# Patient Record
Sex: MALE | Race: WHITE | NOT HISPANIC OR LATINO | Employment: FULL TIME | URBAN - METROPOLITAN AREA
[De-identification: names, ages, dates, MRNs, and addresses within clinical notes are randomized per-mention and may not be internally consistent; named-entity substitution may affect disease eponyms.]

---

## 2020-05-06 ENCOUNTER — TELEPHONE (OUTPATIENT)
Dept: SCHEDULING | Facility: IMAGING CENTER | Age: 35
End: 2020-05-06

## 2020-05-07 ENCOUNTER — TELEMEDICINE (OUTPATIENT)
Dept: MEDICAL GROUP | Facility: MEDICAL CENTER | Age: 35
End: 2020-05-07
Payer: COMMERCIAL

## 2020-05-07 VITALS
SYSTOLIC BLOOD PRESSURE: 120 MMHG | WEIGHT: 315 LBS | TEMPERATURE: 98.6 F | HEIGHT: 69 IN | DIASTOLIC BLOOD PRESSURE: 85 MMHG | BODY MASS INDEX: 46.65 KG/M2

## 2020-05-07 DIAGNOSIS — G47.33 OSA (OBSTRUCTIVE SLEEP APNEA): ICD-10-CM

## 2020-05-07 DIAGNOSIS — E66.01 CLASS 3 SEVERE OBESITY DUE TO EXCESS CALORIES WITHOUT SERIOUS COMORBIDITY WITH BODY MASS INDEX (BMI) OF 45.0 TO 49.9 IN ADULT (HCC): ICD-10-CM

## 2020-05-07 DIAGNOSIS — R06.02 SOB (SHORTNESS OF BREATH): ICD-10-CM

## 2020-05-07 DIAGNOSIS — R42 LIGHTHEADED: ICD-10-CM

## 2020-05-07 DIAGNOSIS — R94.31 ABNORMAL EKG: ICD-10-CM

## 2020-05-07 PROBLEM — E66.813 CLASS 3 SEVERE OBESITY DUE TO EXCESS CALORIES WITHOUT SERIOUS COMORBIDITY WITH BODY MASS INDEX (BMI) OF 45.0 TO 49.9 IN ADULT (HCC): Status: ACTIVE | Noted: 2020-05-07

## 2020-05-07 PROCEDURE — 99203 OFFICE O/P NEW LOW 30 MIN: CPT | Mod: 95,CR | Performed by: FAMILY MEDICINE

## 2020-05-07 SDOH — HEALTH STABILITY: MENTAL HEALTH: HOW MANY STANDARD DRINKS CONTAINING ALCOHOL DO YOU HAVE ON A TYPICAL DAY?: 1 OR 2

## 2020-05-07 SDOH — HEALTH STABILITY: MENTAL HEALTH: HOW OFTEN DO YOU HAVE A DRINK CONTAINING ALCOHOL?: 2-3 TIMES A WEEK

## 2020-05-07 NOTE — ASSESSMENT & PLAN NOTE
Chronic problem. Currently using CPAP. Has for the past couple years. SOB seems worse since starting the cpap, gets lightheaded more often. He does get much better sleep with the cpap.

## 2020-05-07 NOTE — ASSESSMENT & PLAN NOTE
Chronic problem. Has been going on for he past few years. Occurs for 10-15 seconds. Feels the urge to gasp for air at that time. Improves after taking 2-3 deep breaths. Most of the time it happens with exertion.   HaD EKG which showed possible previous MI.   Occurs randomly. Does not seem to be associated with activity.   Works as  for [employer].   BP stable. Checks at home every once in a while.   Gets palpitations at times. Sometimes associated with lightheadedness/SOB.

## 2020-05-07 NOTE — ASSESSMENT & PLAN NOTE
Chronic problem. Has been gaining weight for the past 7-8 yrs. Has gained about 100lbs since then. He was an  at that point.

## 2020-05-08 NOTE — PROGRESS NOTES
Telemedicine Visit: New Patient     This encounter was conducted via Zoom .   Verbal consent was obtained. Patient's identity was verified.    Subjective:     CC:   Connor Jacinto is a 34 y.o. male presenting to establish care and to discuss the evaluation and management of    Lightheaded  Chronic problem. Has been going on for he past few years. Occurs for 10-15 seconds. Feels the urge to gasp for air at that time. Improves after taking 2-3 deep breaths. Most of the time it happens with exertion and is always associated with SOB.   Had EKG which showed possible previous MI.   He reports the symptom of lightheadedness can occur at rest as well.  Works as  for [employer].   BP stable. Checks at home every once in a while.   Gets palpitations at times. Sometimes associated with lightheadedness/SOB.     DONAL (obstructive sleep apnea)  Chronic problem. Currently using CPAP. Has for the past couple years. SOB seems worse since starting the cpap, gets lightheaded more often. He does get much better sleep with the cpap. Does not have a sleep doctor here in [place] yet.     Class 3 severe obesity due to excess calories without serious comorbidity with body mass index (BMI) of 45.0 to 49.9 in adult (HCC)  Chronic problem. Has been gaining weight for the past 7-8 yrs. Has gained about 100lbs since then. He was an  at that point. Reports a rather poor diet. No dedicated exercise although job is somewhat active.       ROS  See HPI  Constitutional: Negative for fever, chills and malaise/fatigue.   HENT: Negative for congestion.    Eyes: Negative for pain.   Respiratory: See HPI  Cardiovascular: See HPI  Gastrointestinal: Negative for nausea, vomiting, abdominal pain and diarrhea.   Genitourinary: Negative for dysuria and hematuria.   Skin: Negative for rash.   Neurological: Negative for dizziness, focal weakness and headaches.   Endo/Heme/Allergies: Does not bruise/bleed easily.   Psychiatric/Behavioral:  "Negative for depression.  The patient is not nervous/anxious.      Allergies   Allergen Reactions   • Morphine        Current medicines (including changes today)  No current outpatient medications on file.     No current facility-administered medications for this visit.        He  has no past medical history on file.  He  has a past surgical history that includes knee reconstruction and hernia repair.      Family History   Problem Relation Age of Onset   • Diabetes Mother    • Heart Disease Father         55   • Hypertension Father    • Diabetes Father    • Lung Disease Father         mesothelioma   • Diabetes Maternal Grandmother      Family Status   Relation Name Status   • Mo  Alive   • Fa     • MGMo  Alive       Patient Active Problem List    Diagnosis Date Noted   • Lightheaded 2020   • DONAL (obstructive sleep apnea) 2020   • Class 3 severe obesity due to excess calories without serious comorbidity with body mass index (BMI) of 45.0 to 49.9 in adult ([facility]) 2020          Objective:   Vitals obtained by patient:  Blood pressure: 120/85, Temp: 98.6, Height: 5'9\" and Weight: 330lb    Physical Exam:  Constitutional: Alert, no distress, well-groomed, obese.  Skin: No rashes in visible areas.  Eye: Round. Conjunctiva clear, lids normal. No icterus.   ENMT: Lips pink without lesions, good dentition, moist mucous membranes. Phonation normal.  Neck: No masses, no thyromegaly. Moves freely without pain.  CV: Pulse as reported by patient  Respiratory: Unlabored respiratory effort, no cough or audible wheeze  Psych: Alert and oriented x3, normal affect and mood.       Assessment and Plan:   The following treatment plan was discussed:     1. Lightheaded  New problem. Going on x 2 years. Assoc with SOB. Referral placed to cardiology. Patient likely needs stress test. Reports h/o abnormal EKG and possible h/o MI according to prior ekg (per patient).  - REFERRAL TO CARDIOLOGY  - CBC WITH DIFFERENTIAL; " Future    2. DONAL (obstructive sleep apnea)  Chronic problem. Uses CPAP regularly. Sleep well. However, reports SOB and lightheadedness seem to be worse since using cpap. Referral placed to sleep medicine.   - REFERRAL TO PULMONOLOGY    3. SOB (shortness of breath)  New problem. Associated with lightheadedness. Improves with some deep breaths. See above for plan.   - REFERRAL TO CARDIOLOGY  - CBC WITH DIFFERENTIAL; Future    4. Abnormal EKG  - REFERRAL TO CARDIOLOGY  - Comp Metabolic Panel; Future    5. Class 3 severe obesity due to excess calories without serious comorbidity with body mass index (BMI) of 45.0 to 49.9 in adult (HCC)  Chronic problem. New to discuss. Discussed diet and exercise today. Ordered the following albs. Plan to f/u in 1 month.  - Comp Metabolic Panel; Future  - Lipid Profile; Future  - TSH WITH REFLEX TO FT4; Future  - HEMOGLOBIN A1C; Future        Follow-up: Return in about 4 weeks (around 6/4/2020).

## 2020-06-26 ENCOUNTER — APPOINTMENT (OUTPATIENT)
Dept: MEDICAL GROUP | Facility: MEDICAL CENTER | Age: 35
End: 2020-06-26
Payer: COMMERCIAL

## 2020-07-10 ENCOUNTER — TELEMEDICINE (OUTPATIENT)
Dept: MEDICAL GROUP | Facility: MEDICAL CENTER | Age: 35
End: 2020-07-10
Payer: COMMERCIAL

## 2020-07-10 VITALS — TEMPERATURE: 98.6 F | BODY MASS INDEX: 46.65 KG/M2 | WEIGHT: 315 LBS | HEIGHT: 69 IN

## 2020-07-10 DIAGNOSIS — M54.50 ACUTE BILATERAL LOW BACK PAIN WITHOUT SCIATICA: ICD-10-CM

## 2020-07-10 PROCEDURE — 99214 OFFICE O/P EST MOD 30 MIN: CPT | Mod: 95,CR | Performed by: FAMILY MEDICINE

## 2020-07-10 ASSESSMENT — PATIENT HEALTH QUESTIONNAIRE - PHQ9: CLINICAL INTERPRETATION OF PHQ2 SCORE: 0
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
## 2020-07-14 ENCOUNTER — TELEPHONE (OUTPATIENT)
Dept: CARDIOLOGY | Facility: MEDICAL CENTER | Age: 35
End: 2020-07-14

## 2020-07-14 NOTE — TELEPHONE ENCOUNTER
Faxed a request for medical records to [facility]   PH:[phone].  FAX:[phone]    To be faxed to our medical records dept. [phone]

## 2020-08-27 ENCOUNTER — TELEPHONE (OUTPATIENT)
Dept: SLEEP MEDICINE | Facility: MEDICAL CENTER | Age: 35
End: 2020-08-27

## 2020-08-27 ENCOUNTER — TELEMEDICINE (OUTPATIENT)
Dept: SLEEP MEDICINE | Facility: MEDICAL CENTER | Age: 35
End: 2020-08-27
Payer: COMMERCIAL

## 2020-08-27 VITALS — BODY MASS INDEX: 45.91 KG/M2 | HEIGHT: 69 IN | WEIGHT: 310 LBS

## 2020-08-27 DIAGNOSIS — G47.33 OSA (OBSTRUCTIVE SLEEP APNEA): ICD-10-CM

## 2020-08-27 PROCEDURE — 99203 OFFICE O/P NEW LOW 30 MIN: CPT | Performed by: FAMILY MEDICINE

## 2020-08-27 NOTE — TELEPHONE ENCOUNTER
[facility] for the pt informing him that I sent him the release of records and the order for new travel cpap to the email [email or web address]. I also advise the pt to give us a call back to schedule a 6 month FV per Dr. Heath and to get the phone of the DME he is using which is [facility] in [place].

## 2020-08-27 NOTE — PROGRESS NOTES
"Telemedicine Visit: New Patient     This encounter was conducted via Zoom . Verbal consent was obtained. Patient's identity was verified.   Given the importance of social distancing and other strategies recommended to reduce the risk of COVID-19 transmission, I am providing medical care to this patient via audio/video visit in place of an in person visit at the request of the patient.        [facility]  Consult Note     Date: 8/27/2020 / Time: 1:22 PM    Patient ID:   Name:             Connor Jacinto     YOB: 1985  Age:                 35 y.o.  male   MRN:               [id number]      Thank you for requesting a sleep medicine consultation on Connor Jacinto at the sleep center. He presents today with the chief complaints of DONAL and to establish as a new pt.Pt was diagnosed with DONAL about 2 years ago in [place] and has been on CPAP since then.The initial presenting symptoms were snoring, witnessed apneas and excessive daytime sleepiness.He is referred by Dr. Patel for evaluation and treatment of sleep disorder breathing.     HISTORY OF PRESENT ILLNESS:       At night,  Connor Jacinto goes to bed around 8-9 pm on weekdays and around 10 pm on the weekends. He gets out of bed at 3:30-4 am on weekdays and at 8-9 am on the weekends.  He  Averages about 8 hrs of sleep on a good night and 6 hrs on a bad night. Pt rarely has bad nights since he has been on the CPAP. He falls asleep within 20-30 minutes. He wakes up about 1 times during the night due to bathroom use and on average It takes him few min to fall back asleep.      He is not aware of snoring/breathing pauses/gasping or choking in sleep since he has been on the CPAP.  He  denies any symptoms of restless legs syndrome such as an \"urge to move\"  He  legs in the evening or bedtime. He  denies any symptoms of narcolepsy such as sleep paralysis or cataplexy, or any symptoms to suggest parasomnias such as sleep walking or acting out of dreams. He  " "has not used any medications for the sleep problem.Overall, he does finds his sleep refreshing.  In terms of  excessive daytime sleepiness, he complains of sleepiness while  at work, while reading or watching TV or while driving. He  occasionally takes a nap. The naps are usually 60 min long.    REVIEW OF SYSTEMS:       Constitutional: Denies fevers, Denies weight changes  Eyes: Denies changes in vision, no eye pain  Ears/Nose/Throat/Mouth: Denies nasal congestion or sore throat   Cardiovascular: Denies chest pain or palpitations   Respiratory: Denies shortness of breath , Denies cough  Gastrointestinal/Hepatic: Denies abdominal pain, nausea, vomiting, diarrhea, constipation or GI bleeding   Genitourinary: Denies bladder dysfunction, dysuria or frequency  Musculoskeletal/Rheum: Denies  joint pain and swelling   Skin/Breast: Denies rash  Neurological: Denies headache, confusion, memory loss or focal weakness/parasthesias  Psychiatric: denies mood disorder     Comprehensive review of systems form is reviewed with the patient and is attached in the EMR.     PMH:  has a past medical history of Chickenpox.  MEDS: No current outpatient medications on file.  ALLERGIES:   Allergies   Allergen Reactions   • Morphine      SURGHX:   Past Surgical History:   Procedure Laterality Date   • HERNIA REPAIR     • KNEE RECONSTRUCTION       SOCHX:  reports that he has been smoking cigarettes. He has been smoking about 1.00 pack per day. He has never used smokeless tobacco. He reports current alcohol use. He reports that he does not use drugs.   FH:   Family History   Problem Relation Age of Onset   • Diabetes Mother    • Heart Disease Father         55   • Hypertension Father    • Diabetes Father    • Lung Disease Father         mesothelioma   • Diabetes Maternal Grandmother    • Sleep Apnea Neg Hx        Physical Exam:  Vitals/ General Appearance:   Weight/BMI: Body mass index is 45.78 kg/m².  Ht 1.753 m (5' 9\")   Wt (!) 140.6 kg " "(310 lb)   Vitals:    08/27/20 1310   Weight: (!) 140.6 kg (310 lb)   Height: 1.753 m (5' 9\")           Constitutional: Alert, no distress, well-groomed.  Skin: No rashes in visible areas.  Eye: Round. Conjunctiva clear, lids normal. No icterus.   ENMT: Lips pink without lesions, good dentition, moist mucous membranes. Phonation normal.  Neck: No masses, no thyromegaly. Moves freely without pain.  CV: Pulse as reported by patient  Respiratory: Unlabored respiratory effort, no cough or audible wheeze  Psych: Alert and oriented x3, normal affect and mood.   INVESTIGATIONS:       .ASSESSMENT AND PLAN     1. Sleep Apnea      The pathophysiology of sleep anea and the increased risk of cardiovascular morbidity from untreated sleep apnea is discussed in detail with the patient. He is urged to avoid supine sleep, weight gain and alcoholic beverages since all of these can worsen sleep apnea. He is cautioned against drowsy driving. If He feels sleepy while driving, He must pull over for a break/nap, rather than persist on the road, in the interest of He own safety and that of others on the road.   Plan   - Continue CPAP at the current pressure   - recommended to bring the CPAP for the compliance download and to sign the release of medical  record ( [facility], [place])   - travel CPAP is ordered today    - Supplies are ordered but waiting for the SS to establish with a local DME    - compliance was reinforced     2. Regarding treatment of other past medical problems and general health maintenance,  He is urged to follow up with PCP.      "

## 2020-09-24 ENCOUNTER — OFFICE VISIT (OUTPATIENT)
Dept: MEDICAL GROUP | Facility: MEDICAL CENTER | Age: 35
End: 2020-09-24
Payer: COMMERCIAL

## 2020-09-24 VITALS
TEMPERATURE: 100.1 F | BODY MASS INDEX: 46.65 KG/M2 | HEART RATE: 101 BPM | SYSTOLIC BLOOD PRESSURE: 124 MMHG | OXYGEN SATURATION: 94 % | DIASTOLIC BLOOD PRESSURE: 78 MMHG | HEIGHT: 69 IN | WEIGHT: 315 LBS

## 2020-09-24 DIAGNOSIS — G89.29 CHRONIC MIDLINE LOW BACK PAIN WITHOUT SCIATICA: ICD-10-CM

## 2020-09-24 DIAGNOSIS — M54.50 CHRONIC MIDLINE LOW BACK PAIN WITHOUT SCIATICA: ICD-10-CM

## 2020-09-24 DIAGNOSIS — E66.01 CLASS 3 SEVERE OBESITY DUE TO EXCESS CALORIES WITHOUT SERIOUS COMORBIDITY WITH BODY MASS INDEX (BMI) OF 45.0 TO 49.9 IN ADULT (HCC): ICD-10-CM

## 2020-09-24 PROCEDURE — 99214 OFFICE O/P EST MOD 30 MIN: CPT | Performed by: FAMILY MEDICINE
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
## 2020-12-03 ENCOUNTER — TELEMEDICINE (OUTPATIENT)
Dept: MEDICAL GROUP | Facility: MEDICAL CENTER | Age: 35
End: 2020-12-03
Payer: COMMERCIAL

## 2020-12-03 VITALS — BODY MASS INDEX: 46.65 KG/M2 | HEIGHT: 69 IN | WEIGHT: 315 LBS | TEMPERATURE: 98.6 F

## 2020-12-03 DIAGNOSIS — M54.50 CHRONIC MIDLINE LOW BACK PAIN WITHOUT SCIATICA: ICD-10-CM

## 2020-12-03 DIAGNOSIS — G89.29 CHRONIC MIDLINE LOW BACK PAIN WITHOUT SCIATICA: ICD-10-CM

## 2020-12-03 PROCEDURE — 99213 OFFICE O/P EST LOW 20 MIN: CPT | Mod: 95,CR | Performed by: FAMILY MEDICINE

## 2020-12-03 RX ORDER — CYCLOBENZAPRINE HCL 10 MG
10 TABLET ORAL NIGHTLY PRN
Qty: 30 TAB | Refills: 1 | Status: SHIPPED | OUTPATIENT
Start: 2020-12-03 | End: 2021-03-11 | Stop reason: SDUPTHER

## 2020-12-04 NOTE — ASSESSMENT & PLAN NOTE
Neck problem.  The patient continues to have the same low back pain.  Worse with laying down.  Difficulty sleeping in bed.  He reported no improvement with PT and actually felt like it was harming him so he stopped going.  He has an appoint with pain management on 18 December.  Denies any numbness, tingling or other neurological issues.

## 2020-12-04 NOTE — PROGRESS NOTES
Virtual Visit: Established Patient   This visit was conducted via Zoom using secure and encrypted videoconferencing technology. The patient was in a private location in the state of [place].    The patient's identity was confirmed and verbal consent was obtained for this virtual visit.    Subjective:   CC:   Chief Complaint   Patient presents with   • Back Pain       Connor Jacinto is a 35 y.o. male presenting for evaluation and management of:    Chronic midline low back pain without sciatica  Neck problem.  The patient continues to have the same low back pain.  Worse with laying down.  Difficulty sleeping in bed.  He reported no improvement with PT and actually felt like it was harming him so he stopped going.  He has an appoint with pain management on 18 December.  Denies any numbness, tingling or other neurological issues.      ROS   Denies any recent fevers or chills. No nausea or vomiting. No chest pains or shortness of breath.     Allergies   Allergen Reactions   • Morphine        Current medicines (including changes today)  Current Outpatient Medications   Medication Sig Dispense Refill   • cyclobenzaprine (FLEXERIL) 10 mg Tab Take 1 Tab by mouth at bedtime as needed for Moderate Pain or Muscle Spasms. 30 Tab 1     No current facility-administered medications for this visit.        Patient Active Problem List    Diagnosis Date Noted   • Chronic midline low back pain without sciatica 07/10/2020   • Lightheaded 05/07/2020   • DONAL (obstructive sleep apnea) 05/07/2020   • Class 3 severe obesity due to excess calories without serious comorbidity with body mass index (BMI) of 45.0 to 49.9 in adult (HCC) 05/07/2020       Family History   Problem Relation Age of Onset   • Diabetes Mother    • Heart Disease Father         55   • Hypertension Father    • Diabetes Father    • Lung Disease Father         mesothelioma   • Diabetes Maternal Grandmother    • Sleep Apnea Neg Hx        He  has a past medical history of  "Chickenpox.  He  has a past surgical history that includes knee reconstruction and hernia repair.       Objective:   Temp 37 °C (98.6 °F) (Temporal)   Ht 1.753 m (5' 9\")   Wt (!) 167.8 kg (370 lb)   BMI 54.64 kg/m²     Physical Exam:  Constitutional: Alert, no distress, well-groomed.  Skin: No rashes in visible areas.  Eye: Round. Conjunctiva clear, lids normal. No icterus.   ENMT: Lips pink without lesions, good dentition, moist mucous membranes. Phonation normal.  Neck: No masses, no thyromegaly. Moves freely without pain.  Respiratory: Unlabored respiratory effort, no cough or audible wheeze  Psych: Alert and oriented x3, normal affect and mood.       Assessment and Plan:   The following treatment plan was discussed:     1. Chronic midline low back pain without sciatica  Chronic problem, uncontrolled.  Has appoint with pain management in a couple weeks.  Has been unable to lose weight.  Reviewed MRI which showed some mild degenerative change but no surgical issues.  Prescription given for Flexeril to see if this helps at all at night.  - cyclobenzaprine (FLEXERIL) 10 mg Tab; Take 1 Tab by mouth at bedtime as needed for Moderate Pain or Muscle Spasms.  Dispense: 30 Tab; Refill: 1        Follow-up: No follow-ups on file.         "

## 2021-01-21 DIAGNOSIS — G89.29 CHRONIC MIDLINE LOW BACK PAIN WITHOUT SCIATICA: ICD-10-CM

## 2021-01-21 DIAGNOSIS — M54.50 CHRONIC MIDLINE LOW BACK PAIN WITHOUT SCIATICA: ICD-10-CM

## 2021-01-22 NOTE — PROGRESS NOTES
Pt.'s wife left a mess stating that   ============================================   [facility]   [address]   [place] [address]   Ph: [phone]   Fax: [phone]   ===========================================   Won't see pt because pt has an insurance claim.   She asked if pt could be referred elsewhere?   Would you please refer pt to a different pain specialist and let us know if there is anything else we can help on our end.   Ariela ROSSI               New referral was placed

## 2021-03-11 ENCOUNTER — TELEMEDICINE (OUTPATIENT)
Dept: MEDICAL GROUP | Facility: MEDICAL CENTER | Age: 36
End: 2021-03-11
Payer: COMMERCIAL

## 2021-03-11 VITALS — TEMPERATURE: 98.6 F | HEIGHT: 69 IN | BODY MASS INDEX: 46.65 KG/M2 | WEIGHT: 315 LBS

## 2021-03-11 DIAGNOSIS — M54.50 CHRONIC MIDLINE LOW BACK PAIN WITHOUT SCIATICA: ICD-10-CM

## 2021-03-11 DIAGNOSIS — G89.29 CHRONIC MIDLINE LOW BACK PAIN WITHOUT SCIATICA: ICD-10-CM

## 2021-03-11 DIAGNOSIS — Z31.41 ENCOUNTER FOR SPERM COUNT FOR FERTILITY TESTING: ICD-10-CM

## 2021-03-11 DIAGNOSIS — Z31.69 INFERTILITY COUNSELING: ICD-10-CM

## 2021-03-11 DIAGNOSIS — N50.9 TESTICULAR ABNORMALITY: ICD-10-CM

## 2021-03-11 PROCEDURE — 99214 OFFICE O/P EST MOD 30 MIN: CPT | Mod: 95,CR | Performed by: FAMILY MEDICINE

## 2021-03-11 RX ORDER — CYCLOBENZAPRINE HCL 10 MG
10 TABLET ORAL NIGHTLY PRN
Qty: 30 TABLET | Refills: 1 | Status: SHIPPED | OUTPATIENT
Start: 2021-03-11 | End: 2021-11-22

## 2021-03-11 ASSESSMENT — PATIENT HEALTH QUESTIONNAIRE - PHQ9: CLINICAL INTERPRETATION OF PHQ2 SCORE: 0

## 2021-03-11 NOTE — ASSESSMENT & PLAN NOTE
Has had bilateral hernia surgeries- Surgery done at the  in [place] [facility]. Patient has been unable to retract his testicles since that time. He was advised that a muscle was cut.   He and his wife have been trying for the past 7-8 months.   They have child, had no issues getting pregnany previously. Daughter is 3.

## 2021-03-12 NOTE — ASSESSMENT & PLAN NOTE
Chronic problem.  The patient has been referred to pain management, however, they are having issues finding a pain management doctor will take them.  His wife did some research, she reports that they can go to [facility].  Referral was placed today.

## 2021-03-12 NOTE — PROGRESS NOTES
Virtual Visit: Established Patient   This visit was conducted via Zoom using secure and encrypted videoconferencing technology. The patient was in a private location in the state of [place].    The patient's identity was confirmed and verbal consent was obtained for this virtual visit.    Subjective:   CC:   Chief Complaint   Patient presents with   • Referral Needed     Fertility Testing   • Back Problem     no change since last visit        Connor Jacinto is a 35 y.o. male presenting for evaluation and management of:    Infertility counseling  Has had bilateral hernia surgeries- Surgery done at the [facility] in [facility]. Patient has been unable to retract his testicles since that time. He was advised that a muscle was cut.   He and his wife have been trying for the past 7-8 months.   They have child, had no issues getting pregnany previously. Daughter is 3.       Chronic midline low back pain without sciatica  Chronic problem.  The patient has been referred to pain management, however, they are having issues finding a pain management doctor will take them.  His wife did some research, she reports that they can go to [facility].      ROS   Denies any recent fevers or chills. No nausea or vomiting. No chest pains or shortness of breath.     Allergies   Allergen Reactions   • Morphine        Current medicines (including changes today)  Current Outpatient Medications   Medication Sig Dispense Refill   • cyclobenzaprine (FLEXERIL) 10 mg Tab Take 1 tablet by mouth at bedtime as needed for Moderate Pain or Muscle Spasms. 30 tablet 1     No current facility-administered medications for this visit.       Patient Active Problem List    Diagnosis Date Noted   • Infertility counseling 03/11/2021   • Chronic midline low back pain without sciatica 07/10/2020   • Lightheaded 05/07/2020   • DONAL (obstructive sleep apnea) 05/07/2020   • Class 3 severe obesity due to excess calories without serious comorbidity with body mass index  "(BMI) of 45.0 to 49.9 in adult ([facility]) 05/07/2020       Family History   Problem Relation Age of Onset   • Diabetes Mother    • Heart Disease Father         55   • Hypertension Father    • Diabetes Father    • Lung Disease Father         mesothelioma   • Diabetes Maternal Grandmother    • Sleep Apnea Neg Hx        He  has a past medical history of Chickenpox.  He  has a past surgical history that includes knee reconstruction and hernia repair.       Objective:   Temp 37 °C (98.6 °F) (Temporal)   Ht 1.753 m (5' 9\")   Wt (!) 168 kg (370 lb)   BMI 54.64 kg/m²     Physical Exam:  Constitutional: Alert, no distress, well-groomed.  Skin: No rashes in visible areas.  Eye: Round. Conjunctiva clear, lids normal. No icterus.   ENMT: Lips pink without lesions, good dentition, moist mucous membranes. Phonation normal.  Neck: No masses, no thyromegaly. Moves freely without pain.  Respiratory: Unlabored respiratory effort, no cough or audible wheeze  Psych: Alert and oriented x3, normal affect and mood.       Assessment and Plan:   The following treatment plan was discussed:     1. Infertility counseling  New problem.  The patient and his wife have been trying for a baby for the past 8 months.  Patient is concerned that this is due to the fact that he had a significant hernia surgery where the hernia was quite severe in both testicles.  Referral placed to urology for sperm testing.    2. Testicular abnormality  - REFERRAL TO UROLOGY    3. Encounter for sperm count for fertility testing  - REFERRAL TO UROLOGY    4. Chronic midline low back pain without sciatica  - REFERRAL TO PAIN MANAGEMENT  - cyclobenzaprine (FLEXERIL) 10 mg Tab; Take 1 tablet by mouth at bedtime as needed for Moderate Pain or Muscle Spasms.  Dispense: 30 tablet; Refill: 1    Chronic problem.  Refill placed for Flexeril.  New referral placed for pain management for [place] [facility].    Follow-up: No follow-ups on file.         "

## 2021-04-09 ENCOUNTER — OFFICE VISIT (OUTPATIENT)
Dept: CARDIOLOGY | Facility: MEDICAL CENTER | Age: 36
End: 2021-04-09
Payer: COMMERCIAL

## 2021-04-09 ENCOUNTER — TELEPHONE (OUTPATIENT)
Dept: CARDIOLOGY | Facility: MEDICAL CENTER | Age: 36
End: 2021-04-09

## 2021-04-09 VITALS
HEART RATE: 108 BPM | DIASTOLIC BLOOD PRESSURE: 72 MMHG | WEIGHT: 315 LBS | OXYGEN SATURATION: 95 % | SYSTOLIC BLOOD PRESSURE: 126 MMHG | BODY MASS INDEX: 46.65 KG/M2 | HEIGHT: 69 IN

## 2021-04-09 DIAGNOSIS — R42 LIGHTHEADEDNESS: ICD-10-CM

## 2021-04-09 DIAGNOSIS — E66.01 MORBID OBESITY (HCC): ICD-10-CM

## 2021-04-09 DIAGNOSIS — R06.02 SOB (SHORTNESS OF BREATH): ICD-10-CM

## 2021-04-09 DIAGNOSIS — R00.2 PALPITATIONS: ICD-10-CM

## 2021-04-09 LAB — EKG IMPRESSION: NORMAL

## 2021-04-09 PROCEDURE — 99204 OFFICE O/P NEW MOD 45 MIN: CPT | Performed by: INTERNAL MEDICINE

## 2021-04-09 PROCEDURE — 93000 ELECTROCARDIOGRAM COMPLETE: CPT | Performed by: INTERNAL MEDICINE

## 2021-04-09 ASSESSMENT — ENCOUNTER SYMPTOMS
ABDOMINAL PAIN: 0
PALPITATIONS: 0
PND: 0
FEVER: 0
DIZZINESS: 0
BLURRED VISION: 0
ORTHOPNEA: 0
INSOMNIA: 0
SHORTNESS OF BREATH: 0
LOSS OF CONSCIOUSNESS: 0
CHILLS: 0
MYALGIAS: 0

## 2021-04-09 NOTE — TELEPHONE ENCOUNTER
Requested heart monitor report from [facility] ([facility]).  T: [phone]  F: [phone]

## 2021-04-09 NOTE — TELEPHONE ENCOUNTER
Cardiology Records requested from [facility] following up from Tanesha's previous request ([place]).  T: [phone]  F: [phone]    Response indicated they have no cardio records on file for patient.
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
## 2021-07-14 ENCOUNTER — NON-PROVIDER VISIT (OUTPATIENT)
Dept: CARDIOLOGY | Facility: MEDICAL CENTER | Age: 36
End: 2021-07-14
Payer: COMMERCIAL

## 2021-07-14 ENCOUNTER — TELEPHONE (OUTPATIENT)
Dept: CARDIOLOGY | Facility: MEDICAL CENTER | Age: 36
End: 2021-07-14

## 2021-07-14 DIAGNOSIS — R00.2 PALPITATIONS: ICD-10-CM

## 2021-07-14 DIAGNOSIS — I49.3 PVC (PREMATURE VENTRICULAR CONTRACTION): ICD-10-CM

## 2021-07-14 NOTE — TELEPHONE ENCOUNTER
Patient enrolled in 14 day Zio Patch program per Dr. Magaña.  At home hookup Zio mailed out to Pt's home.  >Currently pending EOS.

## 2021-08-16 DIAGNOSIS — R00.2 PALPITATIONS: ICD-10-CM

## 2021-08-16 PROCEDURE — 93248 EXT ECG>7D<15D REV&INTERPJ: CPT | Performed by: INTERNAL MEDICINE

## 2021-08-24 ENCOUNTER — TELEPHONE (OUTPATIENT)
Dept: CARDIOLOGY | Facility: MEDICAL CENTER | Age: 36
End: 2021-08-24

## 2021-08-24 DIAGNOSIS — R00.2 PALPITATIONS: ICD-10-CM
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
## 2021-09-01 NOTE — TELEPHONE ENCOUNTER
Spoke to SW and he asked how long the monitor was worn for and I advised patient stated less than a day and it was ordered for 14 days.  He stated ok for another monitor e-patch.     Cardiac monitor ordered.     Routed to schedulers to schedule cardiac event monitor appt.

## 2021-09-30 ENCOUNTER — NON-PROVIDER VISIT (OUTPATIENT)
Dept: CARDIOLOGY | Facility: MEDICAL CENTER | Age: 36
End: 2021-09-30
Payer: COMMERCIAL

## 2021-09-30 ENCOUNTER — TELEPHONE (OUTPATIENT)
Dept: CARDIOLOGY | Facility: MEDICAL CENTER | Age: 36
End: 2021-09-30

## 2021-09-30 DIAGNOSIS — R00.0 SINUS TACHYCARDIA: ICD-10-CM

## 2021-09-30 DIAGNOSIS — I49.1 PREMATURE ATRIAL CONTRACTION: ICD-10-CM

## 2021-09-30 NOTE — TELEPHONE ENCOUNTER
Patient enrolled in the 14 day ePatch Holter monitoring program per Petey Magaña MD.  In office hookup.    >Currently pending EOS.

## 2021-11-17 ENCOUNTER — TELEPHONE (OUTPATIENT)
Dept: CARDIOLOGY | Facility: MEDICAL CENTER | Age: 36
End: 2021-11-17

## 2021-11-17 NOTE — TELEPHONE ENCOUNTER
Called patient to ask about monitor that was placed 9/30/2021. Patient states he still has the monitor with him at his house. Patient will go ahead and mail out the monitor today in order to get results prior to appointment with Sasha Mosley 12/1/2021.

## 2021-11-22 ENCOUNTER — OFFICE VISIT (OUTPATIENT)
Dept: URGENT CARE | Facility: CLINIC | Age: 36
End: 2021-11-22
Payer: COMMERCIAL

## 2021-11-22 VITALS
TEMPERATURE: 96.3 F | BODY MASS INDEX: 46.65 KG/M2 | WEIGHT: 315 LBS | OXYGEN SATURATION: 94 % | SYSTOLIC BLOOD PRESSURE: 146 MMHG | RESPIRATION RATE: 20 BRPM | DIASTOLIC BLOOD PRESSURE: 102 MMHG | HEART RATE: 103 BPM | HEIGHT: 69 IN

## 2021-11-22 DIAGNOSIS — J06.9 UPPER RESPIRATORY INFECTION WITH COUGH AND CONGESTION: ICD-10-CM

## 2021-11-22 DIAGNOSIS — Z20.818 EXPOSURE TO STREPTOCOCCAL PHARYNGITIS: ICD-10-CM

## 2021-11-22 LAB
INT CON NEG: NEGATIVE
INT CON POS: POSITIVE
S PYO AG THROAT QL: NEGATIVE

## 2021-11-22 PROCEDURE — 87880 STREP A ASSAY W/OPTIC: CPT | Performed by: NURSE PRACTITIONER

## 2021-11-22 PROCEDURE — 99214 OFFICE O/P EST MOD 30 MIN: CPT | Performed by: NURSE PRACTITIONER

## 2021-11-22 RX ORDER — IBUPROFEN 200 MG
200 TABLET ORAL
COMMUNITY
End: 2022-03-16

## 2021-11-22 RX ORDER — ACETAMINOPHEN 325 MG/1
650 TABLET ORAL
COMMUNITY
End: 2022-03-16

## 2021-11-22 RX ORDER — AMOXICILLIN AND CLAVULANATE POTASSIUM 875; 125 MG/1; MG/1
TABLET, FILM COATED ORAL
COMMUNITY
Start: 2021-10-12 | End: 2021-11-22

## 2021-11-22 RX ORDER — PREDNISONE 20 MG/1
TABLET ORAL
COMMUNITY
Start: 2021-10-11 | End: 2021-11-22

## 2021-11-22 RX ORDER — AZITHROMYCIN 250 MG/1
TABLET, FILM COATED ORAL
Qty: 6 TABLET | Refills: 0 | Status: SHIPPED | OUTPATIENT
Start: 2021-11-22 | End: 2022-03-16

## 2021-11-22 NOTE — LETTER
November 22, 2021         Patient: Connor Jacinto   YOB: 1985   Date of Visit: 11/22/2021           To Whom it May Concern:    Connor Jacinto was seen in my clinic on 11/22/2021. He is being treated for strep pharyngitis and is excused from work today and tomorrow.     If you have any questions or concerns, please don't hesitate to call.        Sincerely,           BOLA Martinez.  Electronically Signed

## 2021-11-22 NOTE — PROGRESS NOTES
Chief Complaint   Patient presents with   • Cough     runny nose, sneezing, scratchy throat, sore throat, sweat, since yesterday - exposed to + strep       HISTORY OF PRESENT ILLNESS: Patient is a pleasant 36 y.o. male who presents today due to symptoms which started 2 days ago. Pt reports a sore throat, cough, congestion, fever, chills, fatigue, malaise, and body aches. Denies chest pain, shortness of breath, or wheezing.  Denies h/o asthma/copd/CAP. No immunocompromise. Has tried OTC cold medications without significant relief of symptoms. No recent ABX use. No other aggravating or alleviating factors. Reports no known exposure to COVID-19, denies Covid testing today, has not been vaccinated.  Notes that 2 family members have been diagnosed with strep pharyngitis this week and is concerned about such.       Patient Active Problem List    Diagnosis Date Noted   • Morbid obesity (HCC) 04/09/2021   • Infertility counseling 03/11/2021   • Chronic midline low back pain without sciatica 07/10/2020   • Lightheaded 05/07/2020   • DONAL (obstructive sleep apnea) 05/07/2020   • Class 3 severe obesity due to excess calories without serious comorbidity with body mass index (BMI) of 45.0 to 49.9 in adult ([facility]) 05/07/2020       Allergies:Morphine    Current Outpatient Medications Ordered in Epic   Medication Sig Dispense Refill   • metFORMIN (GLUCOPHAGE) 500 MG Tab TAKE 1 TABLET BY MOUTH TWICE A DAY     • azithromycin (ZITHROMAX) 250 MG Tab Take two tabs on day one followed by one tab on days 2-5. 6 Tablet 0   • ibuprofen (MOTRIN) 200 MG Tab Take 200 mg by mouth.     • acetaminophen (TYLENOL) 325 MG Tab Take 650 mg by mouth.       No current Epic-ordered facility-administered medications on file.       Past Medical History:   Diagnosis Date   • Chickenpox        Social History     Tobacco Use   • Smoking status: Current Every Day Smoker     Types: Cigarettes   • Smokeless tobacco: Current User     Types: Chew   • Tobacco comment:  "vape   Vaping Use   • Vaping Use: Never used   Substance Use Topics   • Alcohol use: Yes     Comment: 3-4 times amonth   • Drug use: Not Currently       Family Status   Relation Name Status   • Mo  Alive   • Fa     • MGMo  Alive   • Neg Hx  (Not Specified)     Family History   Problem Relation Age of Onset   • Diabetes Mother    • Heart Disease Father         55   • Hypertension Father    • Diabetes Father    • Lung Disease Father         mesothelioma   • Diabetes Maternal Grandmother    • Sleep Apnea Neg Hx        ROS:  Review of Systems   Constitutional: Positive for subjective fever, chills, fatigue, malaise. Negative for weight loss.  HENT: Positive for rhinitis, sore throat. Negative for ear pain, nosebleeds, and neck pain.    Eyes: Negative for vision changes.   Cardiovascular: Negative for chest pain, palpitations, orthopnea and leg swelling.   Respiratory: Positive for cough without sputum production. Negative for shortness of breath and wheezing.   Gastrointestinal: Negative for abdominal pain, vomiting or diarrhea.   Skin: Negative for rash, diaphoresis.     Exam:  /102 (BP Location: Left arm, Patient Position: Sitting, BP Cuff Size: Adult long)   Pulse (!) 103   Temp (!) 35.7 °C (96.3 °F) (Temporal)   Resp 20   Ht 1.753 m (5' 9\")   Wt (!) 168 kg (370 lb)   SpO2 94%   General: well-nourished, well-developed male in NAD  Head: normocephalic, atraumatic  Eyes: PERRLA, EOM within normal limits, no conjunctival injection, no scleral icterus, visual fields and acuity grossly intact.  Ears: normal shape and symmetry, no tenderness, no discharge. External canals are without any significant edema or erythema. Tympanic membranes are without any inflammation, no effusion. Gross auditory acuity is intact.  Nose: symmetrical without tenderness, mild discharge, erythema present bilateral nares.  Mouth/Throat: reasonable hygiene, no exudates or tonsillar enlargement. Mild erythema present.   Neck: no " masses, range of motion within normal limits, no tracheal deviation.  Lymph: mild cervical adenopathy. No supraclavicular adenopathy.   Neuro: alert and oriented. Cranial nerves 1-12 grossly intact.   Cardiovascular: regular rate auscultated 95, regular rhythm without murmurs, rubs, or gallops. No edema.   Pulmonary: no distress. Chest is symmetrical with respiration. No wheezes, crackles, or rhonchi.   Musculoskeletal: appropriate muscle tone, gait is stable.  Skin: warm, dry, intact, no clubbing, no cyanosis.   Psych: appropriate mood, affect, judgement.       POC strep negative      Assessment/Plan:  1. Upper respiratory infection with cough and congestion  POCT Rapid Strep A    azithromycin (ZITHROMAX) 250 MG Tab   2. Exposure to Streptococcal pharyngitis  azithromycin (ZITHROMAX) 250 MG Tab         Patient presents with both URI and strep exposure.  Strep test is negative in clinic today but will treat due to exposure and presentation.  Azithromycin as directed.  I have offered Covid testing, he is politely declining.  Rest, increase fluids, hand and respiratory hygiene. May take OTC medications as directed for symptom relief. STRICT ER precautions.   Supportive care, differential diagnoses, and indications for immediate follow-up discussed with patient.   Pathogenesis of diagnosis discussed including typical length and natural progression.  Instructed to return to clinic or nearest emergency department for any change in condition, further concerns, or worsening of symptoms.  Patient states understanding of the plan of care and discharge instructions.          BOLA Martinez.

## 2021-11-24 DIAGNOSIS — R00.2 PALPITATIONS: ICD-10-CM

## 2021-12-02 ENCOUNTER — TELEPHONE (OUTPATIENT)
Dept: CARDIOLOGY | Facility: MEDICAL CENTER | Age: 36
End: 2021-12-02

## 2021-12-07 PROCEDURE — 93228 REMOTE 30 DAY ECG REV/REPORT: CPT | Performed by: INTERNAL MEDICINE

## 2021-12-13 ENCOUNTER — TELEPHONE (OUTPATIENT)
Dept: CARDIOLOGY | Facility: MEDICAL CENTER | Age: 36
End: 2021-12-13

## 2021-12-13 NOTE — TELEPHONE ENCOUNTER
Has follow-up appointment 12/30/2021 and will review heart/cardiac event recorder monitor results and his situation/condition at that time  Otherwise no significant abnormality seen

## 2022-03-16 ENCOUNTER — APPOINTMENT (OUTPATIENT)
Dept: RADIOLOGY | Facility: MEDICAL CENTER | Age: 37
DRG: 603 | End: 2022-03-16
Attending: EMERGENCY MEDICINE
Payer: COMMERCIAL

## 2022-03-16 ENCOUNTER — HOSPITAL ENCOUNTER (INPATIENT)
Facility: MEDICAL CENTER | Age: 37
LOS: 5 days | DRG: 603 | End: 2022-03-22
Attending: EMERGENCY MEDICINE | Admitting: STUDENT IN AN ORGANIZED HEALTH CARE EDUCATION/TRAINING PROGRAM
Payer: COMMERCIAL

## 2022-03-16 DIAGNOSIS — L02.214 GROIN ABSCESS: ICD-10-CM

## 2022-03-16 DIAGNOSIS — N49.2 SCROTAL ABSCESS: ICD-10-CM

## 2022-03-16 PROBLEM — E87.20 LACTIC ACIDOSIS: Status: ACTIVE | Noted: 2022-03-16

## 2022-03-16 PROBLEM — E11.9 DM (DIABETES MELLITUS) (HCC): Status: ACTIVE | Noted: 2022-03-16

## 2022-03-16 PROBLEM — L02.215 PERINEAL ABSCESS: Status: ACTIVE | Noted: 2022-03-16

## 2022-03-16 PROBLEM — E86.0 DEHYDRATION: Status: ACTIVE | Noted: 2022-03-16

## 2022-03-16 PROBLEM — R03.0 ELEVATED BLOOD PRESSURE READING: Status: ACTIVE | Noted: 2022-03-16

## 2022-03-16 LAB
ALBUMIN SERPL BCP-MCNC: 4.5 G/DL (ref 3.2–4.9)
ALBUMIN/GLOB SERPL: 1.3 G/DL
ALP SERPL-CCNC: 115 U/L (ref 30–99)
ALT SERPL-CCNC: 156 U/L (ref 2–50)
ANION GAP SERPL CALC-SCNC: 15 MMOL/L (ref 7–16)
APPEARANCE UR: CLEAR
AST SERPL-CCNC: 80 U/L (ref 12–45)
BASOPHILS # BLD AUTO: 2 % (ref 0–1.8)
BASOPHILS # BLD: 0.18 K/UL (ref 0–0.12)
BILIRUB SERPL-MCNC: 0.4 MG/DL (ref 0.1–1.5)
BILIRUB UR QL STRIP.AUTO: NEGATIVE
BUN SERPL-MCNC: 10 MG/DL (ref 8–22)
CALCIUM SERPL-MCNC: 9.5 MG/DL (ref 8.4–10.2)
CHLORIDE SERPL-SCNC: 101 MMOL/L (ref 96–112)
CO2 SERPL-SCNC: 22 MMOL/L (ref 20–33)
COLOR UR: YELLOW
CREAT SERPL-MCNC: 0.75 MG/DL (ref 0.5–1.4)
EOSINOPHIL # BLD AUTO: 0.18 K/UL (ref 0–0.51)
EOSINOPHIL NFR BLD: 2 % (ref 0–6.9)
ERYTHROCYTE [DISTWIDTH] IN BLOOD BY AUTOMATED COUNT: 38.3 FL (ref 35.9–50)
GFR SERPLBLD CREATININE-BSD FMLA CKD-EPI: 119 ML/MIN/1.73 M 2
GLOBULIN SER CALC-MCNC: 3.6 G/DL (ref 1.9–3.5)
GLUCOSE BLD STRIP.AUTO-MCNC: 145 MG/DL (ref 65–99)
GLUCOSE SERPL-MCNC: 212 MG/DL (ref 65–99)
GLUCOSE UR STRIP.AUTO-MCNC: 100 MG/DL
HCT VFR BLD AUTO: 44.9 % (ref 42–52)
HGB BLD-MCNC: 15 G/DL (ref 14–18)
KETONES UR STRIP.AUTO-MCNC: NEGATIVE MG/DL
LACTATE BLD-SCNC: 1.7 MMOL/L (ref 0.5–2)
LACTATE BLD-SCNC: 1.8 MMOL/L (ref 0.5–2)
LACTATE BLD-SCNC: 2.9 MMOL/L (ref 0.5–2)
LEUKOCYTE ESTERASE UR QL STRIP.AUTO: NEGATIVE
LYMPHOCYTES # BLD AUTO: 2.09 K/UL (ref 1–4.8)
LYMPHOCYTES NFR BLD: 23 % (ref 22–41)
MANUAL DIFF BLD: NORMAL
MCH RBC QN AUTO: 28.5 PG (ref 27–33)
MCHC RBC AUTO-ENTMCNC: 33.4 G/DL (ref 33.7–35.3)
MCV RBC AUTO: 85.2 FL (ref 81.4–97.8)
MICRO URNS: ABNORMAL
MONOCYTES # BLD AUTO: 0.36 K/UL (ref 0–0.85)
MONOCYTES NFR BLD AUTO: 4 % (ref 0–13.4)
NEUTROPHILS # BLD AUTO: 6.28 K/UL (ref 1.82–7.42)
NEUTROPHILS NFR BLD: 66 % (ref 44–72)
NEUTS BAND NFR BLD MANUAL: 3 % (ref 0–10)
NITRITE UR QL STRIP.AUTO: NEGATIVE
NRBC # BLD AUTO: 0 K/UL
NRBC BLD-RTO: 0 /100 WBC
PH UR STRIP.AUTO: 5 [PH] (ref 5–8)
PLATELET # BLD AUTO: 302 K/UL (ref 164–446)
PLATELET BLD QL SMEAR: NORMAL
PMV BLD AUTO: 8.1 FL (ref 9–12.9)
POTASSIUM SERPL-SCNC: 3.9 MMOL/L (ref 3.6–5.5)
PROT SERPL-MCNC: 8.1 G/DL (ref 6–8.2)
PROT UR QL STRIP: NEGATIVE MG/DL
RBC # BLD AUTO: 5.27 M/UL (ref 4.7–6.1)
RBC BLD AUTO: NORMAL
RBC UR QL AUTO: NEGATIVE
SODIUM SERPL-SCNC: 138 MMOL/L (ref 135–145)
SP GR UR STRIP.AUTO: >=1.03
WBC # BLD AUTO: 9.1 K/UL (ref 4.8–10.8)

## 2022-03-16 PROCEDURE — 36415 COLL VENOUS BLD VENIPUNCTURE: CPT

## 2022-03-16 PROCEDURE — 99285 EMERGENCY DEPT VISIT HI MDM: CPT

## 2022-03-16 PROCEDURE — 96375 TX/PRO/DX INJ NEW DRUG ADDON: CPT

## 2022-03-16 PROCEDURE — 700105 HCHG RX REV CODE 258

## 2022-03-16 PROCEDURE — 96365 THER/PROPH/DIAG IV INF INIT: CPT

## 2022-03-16 PROCEDURE — G0378 HOSPITAL OBSERVATION PER HR: HCPCS

## 2022-03-16 PROCEDURE — 700105 HCHG RX REV CODE 258: Performed by: HOSPITALIST

## 2022-03-16 PROCEDURE — 81003 URINALYSIS AUTO W/O SCOPE: CPT

## 2022-03-16 PROCEDURE — 80053 COMPREHEN METABOLIC PANEL: CPT

## 2022-03-16 PROCEDURE — 71045 X-RAY EXAM CHEST 1 VIEW: CPT

## 2022-03-16 PROCEDURE — 700102 HCHG RX REV CODE 250 W/ 637 OVERRIDE(OP): Performed by: HOSPITALIST

## 2022-03-16 PROCEDURE — 96374 THER/PROPH/DIAG INJ IV PUSH: CPT

## 2022-03-16 PROCEDURE — 96376 TX/PRO/DX INJ SAME DRUG ADON: CPT

## 2022-03-16 PROCEDURE — 700111 HCHG RX REV CODE 636 W/ 250 OVERRIDE (IP): Performed by: HOSPITALIST

## 2022-03-16 PROCEDURE — 99220 PR INITIAL OBSERVATION CARE,LEVL III: CPT | Performed by: HOSPITALIST

## 2022-03-16 PROCEDURE — 87040 BLOOD CULTURE FOR BACTERIA: CPT

## 2022-03-16 PROCEDURE — 700111 HCHG RX REV CODE 636 W/ 250 OVERRIDE (IP): Performed by: EMERGENCY MEDICINE

## 2022-03-16 PROCEDURE — 85007 BL SMEAR W/DIFF WBC COUNT: CPT

## 2022-03-16 PROCEDURE — 87086 URINE CULTURE/COLONY COUNT: CPT

## 2022-03-16 PROCEDURE — 85027 COMPLETE CBC AUTOMATED: CPT

## 2022-03-16 PROCEDURE — A9270 NON-COVERED ITEM OR SERVICE: HCPCS | Performed by: HOSPITALIST

## 2022-03-16 PROCEDURE — 83605 ASSAY OF LACTIC ACID: CPT

## 2022-03-16 PROCEDURE — 82962 GLUCOSE BLOOD TEST: CPT

## 2022-03-16 RX ORDER — METOCLOPRAMIDE HYDROCHLORIDE 5 MG/ML
10 INJECTION INTRAMUSCULAR; INTRAVENOUS EVERY 6 HOURS PRN
Status: DISCONTINUED | OUTPATIENT
Start: 2022-03-16 | End: 2022-03-22 | Stop reason: HOSPADM

## 2022-03-16 RX ORDER — AMOXICILLIN 250 MG
2 CAPSULE ORAL 2 TIMES DAILY
Status: DISCONTINUED | OUTPATIENT
Start: 2022-03-17 | End: 2022-03-22 | Stop reason: HOSPADM

## 2022-03-16 RX ORDER — LABETALOL HYDROCHLORIDE 5 MG/ML
10 INJECTION, SOLUTION INTRAVENOUS EVERY 4 HOURS PRN
Status: DISCONTINUED | OUTPATIENT
Start: 2022-03-16 | End: 2022-03-22 | Stop reason: HOSPADM

## 2022-03-16 RX ORDER — HYDROMORPHONE HYDROCHLORIDE 1 MG/ML
.5-1 INJECTION, SOLUTION INTRAMUSCULAR; INTRAVENOUS; SUBCUTANEOUS
Status: DISCONTINUED | OUTPATIENT
Start: 2022-03-16 | End: 2022-03-17

## 2022-03-16 RX ORDER — SODIUM CHLORIDE, SODIUM LACTATE, POTASSIUM CHLORIDE, AND CALCIUM CHLORIDE .6; .31; .03; .02 G/100ML; G/100ML; G/100ML; G/100ML
30 INJECTION, SOLUTION INTRAVENOUS
Status: DISCONTINUED | OUTPATIENT
Start: 2022-03-16 | End: 2022-03-22 | Stop reason: HOSPADM

## 2022-03-16 RX ORDER — BISACODYL 10 MG
10 SUPPOSITORY, RECTAL RECTAL
Status: DISCONTINUED | OUTPATIENT
Start: 2022-03-16 | End: 2022-03-22 | Stop reason: HOSPADM

## 2022-03-16 RX ORDER — POLYETHYLENE GLYCOL 3350 17 G/17G
1 POWDER, FOR SOLUTION ORAL
Status: DISCONTINUED | OUTPATIENT
Start: 2022-03-16 | End: 2022-03-22 | Stop reason: HOSPADM

## 2022-03-16 RX ORDER — CLINDAMYCIN HYDROCHLORIDE 300 MG/1
300 CAPSULE ORAL 3 TIMES DAILY
Status: ON HOLD | COMMUNITY
End: 2022-03-22

## 2022-03-16 RX ORDER — SODIUM CHLORIDE 9 MG/ML
INJECTION, SOLUTION INTRAVENOUS CONTINUOUS
Status: DISCONTINUED | OUTPATIENT
Start: 2022-03-16 | End: 2022-03-17

## 2022-03-16 RX ORDER — HYDRALAZINE HYDROCHLORIDE 20 MG/ML
20 INJECTION INTRAMUSCULAR; INTRAVENOUS EVERY 6 HOURS PRN
Status: DISCONTINUED | OUTPATIENT
Start: 2022-03-16 | End: 2022-03-22 | Stop reason: HOSPADM

## 2022-03-16 RX ORDER — HYDROMORPHONE HYDROCHLORIDE 1 MG/ML
0.5 INJECTION, SOLUTION INTRAMUSCULAR; INTRAVENOUS; SUBCUTANEOUS ONCE
Status: COMPLETED | OUTPATIENT
Start: 2022-03-16 | End: 2022-03-16

## 2022-03-16 RX ORDER — HEPARIN SODIUM 5000 [USP'U]/ML
5000 INJECTION, SOLUTION INTRAVENOUS; SUBCUTANEOUS EVERY 8 HOURS
Status: DISCONTINUED | OUTPATIENT
Start: 2022-03-17 | End: 2022-03-20

## 2022-03-16 RX ORDER — HYDROMORPHONE HYDROCHLORIDE 2 MG/1
2 TABLET ORAL
Status: DISCONTINUED | OUTPATIENT
Start: 2022-03-16 | End: 2022-03-17

## 2022-03-16 RX ORDER — ONDANSETRON 2 MG/ML
4 INJECTION INTRAMUSCULAR; INTRAVENOUS EVERY 4 HOURS PRN
Status: DISCONTINUED | OUTPATIENT
Start: 2022-03-16 | End: 2022-03-22 | Stop reason: HOSPADM

## 2022-03-16 RX ORDER — SODIUM CHLORIDE, SODIUM LACTATE, POTASSIUM CHLORIDE, AND CALCIUM CHLORIDE .6; .31; .03; .02 G/100ML; G/100ML; G/100ML; G/100ML
500 INJECTION, SOLUTION INTRAVENOUS
Status: COMPLETED | OUTPATIENT
Start: 2022-03-16 | End: 2022-03-17

## 2022-03-16 RX ORDER — SODIUM CHLORIDE 9 MG/ML
INJECTION, SOLUTION INTRAVENOUS
Status: COMPLETED
Start: 2022-03-16 | End: 2022-03-16

## 2022-03-16 RX ORDER — CEFTRIAXONE 2 G/1
2 INJECTION, POWDER, FOR SOLUTION INTRAMUSCULAR; INTRAVENOUS ONCE
Status: COMPLETED | OUTPATIENT
Start: 2022-03-16 | End: 2022-03-16

## 2022-03-16 RX ORDER — ONDANSETRON 4 MG/1
4 TABLET, ORALLY DISINTEGRATING ORAL EVERY 4 HOURS PRN
Status: DISCONTINUED | OUTPATIENT
Start: 2022-03-16 | End: 2022-03-22 | Stop reason: HOSPADM

## 2022-03-16 RX ORDER — HYDROMORPHONE HYDROCHLORIDE 1 MG/ML
0.25 INJECTION, SOLUTION INTRAMUSCULAR; INTRAVENOUS; SUBCUTANEOUS
Status: DISCONTINUED | OUTPATIENT
Start: 2022-03-16 | End: 2022-03-16

## 2022-03-16 RX ORDER — ACETAMINOPHEN 325 MG/1
650 TABLET ORAL EVERY 6 HOURS PRN
Status: DISCONTINUED | OUTPATIENT
Start: 2022-03-16 | End: 2022-03-22 | Stop reason: HOSPADM

## 2022-03-16 RX ORDER — OXYCODONE HYDROCHLORIDE 5 MG/1
2.5 TABLET ORAL
Status: DISCONTINUED | OUTPATIENT
Start: 2022-03-16 | End: 2022-03-16

## 2022-03-16 RX ORDER — OXYCODONE HYDROCHLORIDE 5 MG/1
5 TABLET ORAL
Status: DISCONTINUED | OUTPATIENT
Start: 2022-03-16 | End: 2022-03-16

## 2022-03-16 RX ORDER — AMOXICILLIN AND CLAVULANATE POTASSIUM 500; 125 MG/1; MG/1
1 TABLET, FILM COATED ORAL 3 TIMES DAILY
Status: ON HOLD | COMMUNITY
End: 2022-03-22

## 2022-03-16 RX ADMIN — HYDROMORPHONE HYDROCHLORIDE 2 MG: 2 TABLET ORAL at 21:31

## 2022-03-16 RX ADMIN — FENTANYL CITRATE 50 MCG: 50 INJECTION, SOLUTION INTRAMUSCULAR; INTRAVENOUS at 16:55

## 2022-03-16 RX ADMIN — ACETAMINOPHEN 650 MG: 325 TABLET, FILM COATED ORAL at 23:00

## 2022-03-16 RX ADMIN — PIPERACILLIN AND TAZOBACTAM 4.5 G: 4; .5 INJECTION, POWDER, LYOPHILIZED, FOR SOLUTION INTRAVENOUS; PARENTERAL at 23:18

## 2022-03-16 RX ADMIN — HYDROMORPHONE HYDROCHLORIDE 0.5 MG: 1 INJECTION, SOLUTION INTRAMUSCULAR; INTRAVENOUS; SUBCUTANEOUS at 17:17

## 2022-03-16 RX ADMIN — SODIUM CHLORIDE 100 ML: 900 INJECTION INTRAVENOUS at 16:56

## 2022-03-16 RX ADMIN — CEFTRIAXONE SODIUM 2 G: 2 INJECTION, POWDER, FOR SOLUTION INTRAMUSCULAR; INTRAVENOUS at 16:56

## 2022-03-16 RX ADMIN — SODIUM CHLORIDE: 9 INJECTION, SOLUTION INTRAVENOUS at 20:50

## 2022-03-16 RX ADMIN — PIPERACILLIN AND TAZOBACTAM 4.5 G: 4; .5 INJECTION, POWDER, LYOPHILIZED, FOR SOLUTION INTRAVENOUS; PARENTERAL at 20:15

## 2022-03-16 ASSESSMENT — LIFESTYLE VARIABLES
ALCOHOL_USE: YES
EVER HAD A DRINK FIRST THING IN THE MORNING TO STEADY YOUR NERVES TO GET RID OF A HANGOVER: NO
AVERAGE NUMBER OF DAYS PER WEEK YOU HAVE A DRINK CONTAINING ALCOHOL: 1
HOW MANY TIMES IN THE PAST YEAR HAVE YOU HAD 5 OR MORE DRINKS IN A DAY: 0
TOTAL SCORE: 0
TOTAL SCORE: 0
CONSUMPTION TOTAL: NEGATIVE
HAVE PEOPLE ANNOYED YOU BY CRITICIZING YOUR DRINKING: NO
EVER FELT BAD OR GUILTY ABOUT YOUR DRINKING: NO
TOTAL SCORE: 0
HAVE YOU EVER FELT YOU SHOULD CUT DOWN ON YOUR DRINKING: NO
ON A TYPICAL DAY WHEN YOU DRINK ALCOHOL HOW MANY DRINKS DO YOU HAVE: 3

## 2022-03-16 ASSESSMENT — PAIN DESCRIPTION - PAIN TYPE
TYPE: ACUTE PAIN

## 2022-03-16 ASSESSMENT — ENCOUNTER SYMPTOMS
SHORTNESS OF BREATH: 0
STRIDOR: 0
FEVER: 0
MYALGIAS: 0
CHILLS: 1
BRUISES/BLEEDS EASILY: 0
FOCAL WEAKNESS: 0
VOMITING: 0
FLANK PAIN: 0
EYE REDNESS: 0
ABDOMINAL PAIN: 0
EYE DISCHARGE: 0
NERVOUS/ANXIOUS: 0
COUGH: 0

## 2022-03-16 ASSESSMENT — PATIENT HEALTH QUESTIONNAIRE - PHQ9
1. LITTLE INTEREST OR PLEASURE IN DOING THINGS: NOT AT ALL
2. FEELING DOWN, DEPRESSED, IRRITABLE, OR HOPELESS: NOT AT ALL
SUM OF ALL RESPONSES TO PHQ9 QUESTIONS 1 AND 2: 0

## 2022-03-16 ASSESSMENT — COGNITIVE AND FUNCTIONAL STATUS - GENERAL
SUGGESTED CMS G CODE MODIFIER MOBILITY: CH
DAILY ACTIVITIY SCORE: 24
SUGGESTED CMS G CODE MODIFIER DAILY ACTIVITY: CH
MOBILITY SCORE: 24

## 2022-03-16 NOTE — ED PROVIDER NOTES
"ED Provider Note    CHIEF COMPLAINT  Chief Complaint   Patient presents with   • Groin Pain     Has had a groin infection since approx 3/4/22  Admitted to [facility] 3/5 and left AMA due to a \" bad experience \"   On augmentin currently  Followed by ID and told to come to ED         HPI  Connor Jacinto is a 36 y.o. male who presents to the emergency department for further evaluation of groin abscess. Past medical history most significant for diabetes. He admits that he will monitor his blood sugars but is noncompliant with his diabetic medications. No other prescription medications.    He noticed a small bump behind his scrotum on March 4. 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 On the morning of March 5 the lesion was worse and more uncomfortable and blood sugars continue to elevate and he therefore went to [facility] emergency department. He was then brought in as an inpatient for seven days. During this time he had a needle aspiration, wound cultures and IV antibiotics. Diagnostically he also had ultrasound and CT imaging of the pelvis. Ultimately he left [facility] on March 12. For the last four days he has been on dual oral antibiotic therapy including Augmentin and clindamycin. He had his first follow-up appointment with [facility] earlier today and they continue to be concerned and felt that he would likely need a PICC line and ongoing IV antibiotics. He was therefore directed back to the emergency department but due to dissatisfaction with [facility] care specifically the nurses he decided to come to this hospital instead.    He denies any fever, chills, rigors. No other symptoms. He feels that the lesion has been relatively stable although maybe mildly enlarging since this time of discharge. No difficulty with bowel or bladder. No nausea vomiting. No diarrhea. No abdominal pain.    Chart review: [facility] documentation reports culture showing group B strep. Patient had initially been on Zosyn and switched to depend him at the time of discharge.    REVIEW OF SYSTEMS  See HPI for further details. All other systems are negative.     PAST MEDICAL HISTORY   has a past medical history of Chickenpox, Diabetes (HCC), Obesity, and Sleep apnea.    SOCIAL HISTORY  Social History     Tobacco Use   • Smoking status: Former Smoker     Types: Cigarettes   • Smokeless tobacco: Current User     Types: Chew   • Tobacco comment: vape   Vaping Use   • Vaping Use: Some days   • Substances: Nicotine, THC, CBD   Substance and Sexual Activity   • Alcohol use: Yes     Comment: 3-4 times amonth   • Drug use: Yes     Types: Inhaled     Comment: THC   • Sexual activity: Yes     Partners: Female     Comment: , works as   at [employer]       SURGICAL HISTORY   has a past surgical history that includes knee reconstruction and hernia repair.    CURRENT MEDICATIONS  Home Medications     Reviewed by Michele Alan (Pharmacy Tech) on 03/16/22 at 1604  Med List Status: Complete   Medication Last Dose Status   amoxicillin-clavulanate (AUGMENTIN) 500-125 MG Tab 3/16/2022 Active   clindamycin (CLEOCIN) 300 MG Cap 3/16/2022 Active   metFORMIN (GLUCOPHAGE) 500 MG Tab 3/16/2022 Active                ALLERGIES  Allergies   Allergen Reactions   • Morphine      Pt reports that its makes his body feel on fire        PHYSICAL EXAM  VITAL SIGNS: BP (!) 165/83   Pulse 93   Temp 36.4 °C (97.5 °F) (Temporal)   Resp 18   Ht 1.753 m (5' 9\")   Wt (!) 158 kg (348 lb 5.2 oz)   SpO2 95%   BMI 51.44 kg/m²  @GINA[[id number]::@   Pulse ox interpretation: I interpret this pulse ox as normal.  Constitutional: Alert in no apparent distress.  HENT: No signs of trauma, Bilateral external ears normal, Nose normal.   Eyes: Pupils are equal and reactive  Neck: Normal range of motion, No tenderness, Supple  Cardiovascular: Regular rate and rhythm, no " murmurs.   Thorax & Lungs: Normal breath sounds, No respiratory distress, No wheezing, No chest tenderness.   Abdomen: Bowel sounds normal, Soft, No tenderness  : bulging enlargement posterior to the scrotum before the gluteal cleft. Mild to moderately tender with palpation. Flucuant. No evidence of Iveth's.   Bedside US: superficial callable stoning with deeper pockets of fluid. Wound visually most consistent with abscess measuring roughly 3 to 4 cm in diameter.  Skin: Warm, Dry, No erythema, No rash.   Extremities: Intact distal pulses, No edema, No tenderness, No cyanosis,  Negative Aung's sign.   Musculoskeletal: Good range of motion in all major joints. No tenderness to palpation or major deformities noted.   Neurologic: Alert , Normal motor function, Normal sensory function, No focal deficits noted.   Psychiatric: Affect normal, Judgment normal, Mood normal.       DIAGNOSTIC STUDIES / PROCEDURES      LABS  Results for orders placed or performed during the hospital encounter of 03/16/22   Lactic acid (lactate)   Result Value Ref Range    Lactic Acid 2.9 (H) 0.5 - 2.0 mmol/L   CBC WITH DIFFERENTIAL   Result Value Ref Range    WBC 9.1 4.8 - 10.8 K/uL    RBC 5.27 4.70 - 6.10 M/uL    Hemoglobin 15.0 14.0 - 18.0 g/dL    Hematocrit 44.9 42.0 - 52.0 %    MCV 85.2 81.4 - 97.8 fL    MCH 28.5 27.0 - 33.0 pg    MCHC 33.4 (L) 33.7 - 35.3 g/dL    RDW 38.3 35.9 - 50.0 fL    Platelet Count 302 164 - 446 K/uL    MPV 8.1 (L) 9.0 - 12.9 fL    Neutrophils-Polys 66.00 44.00 - 72.00 %    Lymphocytes 23.00 22.00 - 41.00 %    Monocytes 4.00 0.00 - 13.40 %    Eosinophils 2.00 0.00 - 6.90 %    Basophils 2.00 (H) 0.00 - 1.80 %    Nucleated RBC 0.00 /100 WBC    Neutrophils (Absolute) 6.28 1.82 - 7.42 K/uL    Lymphs (Absolute) 2.09 1.00 - 4.80 K/uL    Monos (Absolute) 0.36 0.00 - 0.85 K/uL    Eos (Absolute) 0.18 0.00 - 0.51 K/uL    Baso (Absolute) 0.18 (H) 0.00 - 0.12 K/uL    NRBC (Absolute) 0.00 K/uL   COMP METABOLIC PANEL   Result  Value Ref Range    Sodium 138 135 - 145 mmol/L    Potassium 3.9 3.6 - 5.5 mmol/L    Chloride 101 96 - 112 mmol/L    Co2 22 20 - 33 mmol/L    Anion Gap 15.0 7.0 - 16.0    Glucose 212 (H) 65 - 99 mg/dL    Bun 10 8 - 22 mg/dL    Creatinine 0.75 0.50 - 1.40 mg/dL    Calcium 9.5 8.4 - 10.2 mg/dL    AST(SGOT) 80 (H) 12 - 45 U/L    ALT(SGPT) 156 (H) 2 - 50 U/L    Alkaline Phosphatase 115 (H) 30 - 99 U/L    Total Bilirubin 0.4 0.1 - 1.5 mg/dL    Albumin 4.5 3.2 - 4.9 g/dL    Total Protein 8.1 6.0 - 8.2 g/dL    Globulin 3.6 (H) 1.9 - 3.5 g/dL    A-G Ratio 1.3 g/dL   URINALYSIS    Specimen: Urine, Clean Catch   Result Value Ref Range    Color Yellow     Character Clear     Specific Gravity >=1.030 <1.035    Ph 5.0 5.0 - 8.0    Glucose 100 (A) Negative mg/dL    Ketones Negative Negative mg/dL    Protein Negative Negative mg/dL    Bilirubin Negative Negative    Nitrite Negative Negative    Leukocyte Esterase Negative Negative    Occult Blood Negative Negative    Micro Urine Req see below    ESTIMATED GFR   Result Value Ref Range    GFR (CKD-EPI) 119 >60 mL/min/1.73 m 2   MORPHOLOGY   Result Value Ref Range    RBC Morphology Normal    PLATELET ESTIMATE   Result Value Ref Range    Plt Estimation Normal    DIFFERENTIAL MANUAL   Result Value Ref Range    Bands-Stabs 3.00 0.00 - 10.00 %    Manual Diff Status PERFORMED              1645:  D/w hospitalist.    D/w gen Surg. ([facility], [place])     COURSE & MEDICAL DECISION MAKING  Pertinent Labs & Imaging studies reviewed. (See chart for details)  36-year-old male presented to the emergency room and with persistent peroneal abscess. History as above. I have consulted both the general surgeon and hospitalist. Patient will be brought in primary under the hospital service. I have started empiric ceftriaxone here in the emergency department. Infectious disease additionally following is a understanding provider team. Surgical evaluated patient tomorrow for further care options. No current evidence  of infectious decline such as sepsis.    FINAL IMPRESSION  1. Groin abscess            Electronically signed by: Demario Ramirez M.D., 3/16/2022 4:47 PM

## 2022-03-17 ENCOUNTER — APPOINTMENT (OUTPATIENT)
Dept: RADIOLOGY | Facility: MEDICAL CENTER | Age: 37
DRG: 603 | End: 2022-03-17
Attending: STUDENT IN AN ORGANIZED HEALTH CARE EDUCATION/TRAINING PROGRAM
Payer: COMMERCIAL

## 2022-03-17 ENCOUNTER — ANESTHESIA (OUTPATIENT)
Dept: SURGERY | Facility: MEDICAL CENTER | Age: 37
DRG: 603 | End: 2022-03-17
Payer: COMMERCIAL

## 2022-03-17 ENCOUNTER — ANESTHESIA EVENT (OUTPATIENT)
Dept: SURGERY | Facility: MEDICAL CENTER | Age: 37
DRG: 603 | End: 2022-03-17
Payer: COMMERCIAL

## 2022-03-17 LAB
ALBUMIN SERPL BCP-MCNC: 3.9 G/DL (ref 3.2–4.9)
ALBUMIN/GLOB SERPL: 1.3 G/DL
ALP SERPL-CCNC: 100 U/L (ref 30–99)
ALT SERPL-CCNC: 128 U/L (ref 2–50)
ANION GAP SERPL CALC-SCNC: 16 MMOL/L (ref 7–16)
AST SERPL-CCNC: 73 U/L (ref 12–45)
BASOPHILS # BLD AUTO: 1.5 % (ref 0–1.8)
BASOPHILS # BLD: 0.11 K/UL (ref 0–0.12)
BILIRUB SERPL-MCNC: 0.5 MG/DL (ref 0.1–1.5)
BUN SERPL-MCNC: 9 MG/DL (ref 8–22)
CALCIUM SERPL-MCNC: 9.1 MG/DL (ref 8.4–10.2)
CHLORIDE SERPL-SCNC: 100 MMOL/L (ref 96–112)
CO2 SERPL-SCNC: 18 MMOL/L (ref 20–33)
CREAT SERPL-MCNC: 0.71 MG/DL (ref 0.5–1.4)
EOSINOPHIL # BLD AUTO: 0.27 K/UL (ref 0–0.51)
EOSINOPHIL NFR BLD: 3.6 % (ref 0–6.9)
ERYTHROCYTE [DISTWIDTH] IN BLOOD BY AUTOMATED COUNT: 40 FL (ref 35.9–50)
EXTERNAL QUALITY CONTROL: NORMAL
GFR SERPLBLD CREATININE-BSD FMLA CKD-EPI: 121 ML/MIN/1.73 M 2
GLOBULIN SER CALC-MCNC: 3.1 G/DL (ref 1.9–3.5)
GLUCOSE BLD STRIP.AUTO-MCNC: 114 MG/DL (ref 65–99)
GLUCOSE BLD STRIP.AUTO-MCNC: 134 MG/DL (ref 65–99)
GLUCOSE BLD STRIP.AUTO-MCNC: 150 MG/DL (ref 65–99)
GLUCOSE BLD STRIP.AUTO-MCNC: 167 MG/DL (ref 65–99)
GLUCOSE BLD STRIP.AUTO-MCNC: 174 MG/DL (ref 65–99)
GLUCOSE BLD STRIP.AUTO-MCNC: 250 MG/DL (ref 65–99)
GLUCOSE SERPL-MCNC: 152 MG/DL (ref 65–99)
HCT VFR BLD AUTO: 42.5 % (ref 42–52)
HGB BLD-MCNC: 13.6 G/DL (ref 14–18)
IMM GRANULOCYTES # BLD AUTO: 0.28 K/UL (ref 0–0.11)
IMM GRANULOCYTES NFR BLD AUTO: 3.8 % (ref 0–0.9)
LACTATE BLD-SCNC: 1.9 MMOL/L (ref 0.5–2)
LACTATE BLD-SCNC: 2.5 MMOL/L (ref 0.5–2)
LYMPHOCYTES # BLD AUTO: 2.54 K/UL (ref 1–4.8)
LYMPHOCYTES NFR BLD: 34.1 % (ref 22–41)
MAGNESIUM SERPL-MCNC: 1.7 MG/DL (ref 1.5–2.5)
MCH RBC QN AUTO: 27.9 PG (ref 27–33)
MCHC RBC AUTO-ENTMCNC: 32 G/DL (ref 33.7–35.3)
MCV RBC AUTO: 87.1 FL (ref 81.4–97.8)
MONOCYTES # BLD AUTO: 0.5 K/UL (ref 0–0.85)
MONOCYTES NFR BLD AUTO: 6.7 % (ref 0–13.4)
NEUTROPHILS # BLD AUTO: 3.75 K/UL (ref 1.82–7.42)
NEUTROPHILS NFR BLD: 50.3 % (ref 44–72)
NRBC # BLD AUTO: 0 K/UL
NRBC BLD-RTO: 0 /100 WBC
PLATELET # BLD AUTO: 246 K/UL (ref 164–446)
PMV BLD AUTO: 8.7 FL (ref 9–12.9)
POTASSIUM SERPL-SCNC: 3.7 MMOL/L (ref 3.6–5.5)
PROT SERPL-MCNC: 7 G/DL (ref 6–8.2)
RBC # BLD AUTO: 4.88 M/UL (ref 4.7–6.1)
SARS-COV+SARS-COV-2 AG RESP QL IA.RAPID: NEGATIVE
SODIUM SERPL-SCNC: 134 MMOL/L (ref 135–145)
WBC # BLD AUTO: 7.5 K/UL (ref 4.8–10.8)

## 2022-03-17 PROCEDURE — 700111 HCHG RX REV CODE 636 W/ 250 OVERRIDE (IP): Performed by: ANESTHESIOLOGY

## 2022-03-17 PROCEDURE — 87426 SARSCOV CORONAVIRUS AG IA: CPT | Performed by: UROLOGY

## 2022-03-17 PROCEDURE — 700105 HCHG RX REV CODE 258: Performed by: HOSPITALIST

## 2022-03-17 PROCEDURE — 700111 HCHG RX REV CODE 636 W/ 250 OVERRIDE (IP): Performed by: STUDENT IN AN ORGANIZED HEALTH CARE EDUCATION/TRAINING PROGRAM

## 2022-03-17 PROCEDURE — A9270 NON-COVERED ITEM OR SERVICE: HCPCS | Performed by: HOSPITALIST

## 2022-03-17 PROCEDURE — 160009 HCHG ANES TIME/MIN: Performed by: UROLOGY

## 2022-03-17 PROCEDURE — 82962 GLUCOSE BLOOD TEST: CPT

## 2022-03-17 PROCEDURE — 160002 HCHG RECOVERY MINUTES (STAT): Performed by: UROLOGY

## 2022-03-17 PROCEDURE — 96375 TX/PRO/DX INJ NEW DRUG ADDON: CPT

## 2022-03-17 PROCEDURE — 87070 CULTURE OTHR SPECIMN AEROBIC: CPT

## 2022-03-17 PROCEDURE — 700102 HCHG RX REV CODE 250 W/ 637 OVERRIDE(OP): Performed by: ANESTHESIOLOGY

## 2022-03-17 PROCEDURE — 700102 HCHG RX REV CODE 250 W/ 637 OVERRIDE(OP): Performed by: HOSPITALIST

## 2022-03-17 PROCEDURE — 87075 CULTR BACTERIA EXCEPT BLOOD: CPT

## 2022-03-17 PROCEDURE — 160036 HCHG PACU - EA ADDL 30 MINS PHASE I: Performed by: UROLOGY

## 2022-03-17 PROCEDURE — 0J9B0ZZ DRAINAGE OF PERINEUM SUBCUTANEOUS TISSUE AND FASCIA, OPEN APPROACH: ICD-10-PCS | Performed by: UROLOGY

## 2022-03-17 PROCEDURE — 700101 HCHG RX REV CODE 250: Performed by: ANESTHESIOLOGY

## 2022-03-17 PROCEDURE — 700111 HCHG RX REV CODE 636 W/ 250 OVERRIDE (IP): Performed by: HOSPITALIST

## 2022-03-17 PROCEDURE — 80053 COMPREHEN METABOLIC PANEL: CPT

## 2022-03-17 PROCEDURE — 83735 ASSAY OF MAGNESIUM: CPT

## 2022-03-17 PROCEDURE — 99233 SBSQ HOSP IP/OBS HIGH 50: CPT | Performed by: STUDENT IN AN ORGANIZED HEALTH CARE EDUCATION/TRAINING PROGRAM

## 2022-03-17 PROCEDURE — 83605 ASSAY OF LACTIC ACID: CPT

## 2022-03-17 PROCEDURE — 96366 THER/PROPH/DIAG IV INF ADDON: CPT

## 2022-03-17 PROCEDURE — 700102 HCHG RX REV CODE 250 W/ 637 OVERRIDE(OP): Performed by: STUDENT IN AN ORGANIZED HEALTH CARE EDUCATION/TRAINING PROGRAM

## 2022-03-17 PROCEDURE — 94760 N-INVAS EAR/PLS OXIMETRY 1: CPT

## 2022-03-17 PROCEDURE — A9270 NON-COVERED ITEM OR SERVICE: HCPCS | Performed by: STUDENT IN AN ORGANIZED HEALTH CARE EDUCATION/TRAINING PROGRAM

## 2022-03-17 PROCEDURE — 160048 HCHG OR STATISTICAL LEVEL 1-5: Performed by: UROLOGY

## 2022-03-17 PROCEDURE — 36415 COLL VENOUS BLD VENIPUNCTURE: CPT

## 2022-03-17 PROCEDURE — A9270 NON-COVERED ITEM OR SERVICE: HCPCS | Performed by: ANESTHESIOLOGY

## 2022-03-17 PROCEDURE — 160027 HCHG SURGERY MINUTES - 1ST 30 MINS LEVEL 2: Performed by: UROLOGY

## 2022-03-17 PROCEDURE — 700105 HCHG RX REV CODE 258: Performed by: UROLOGY

## 2022-03-17 PROCEDURE — 87205 SMEAR GRAM STAIN: CPT

## 2022-03-17 PROCEDURE — 76870 US EXAM SCROTUM: CPT

## 2022-03-17 PROCEDURE — 74176 CT ABD & PELVIS W/O CONTRAST: CPT

## 2022-03-17 PROCEDURE — 94660 CPAP INITIATION&MGMT: CPT

## 2022-03-17 PROCEDURE — 160035 HCHG PACU - 1ST 60 MINS PHASE I: Performed by: UROLOGY

## 2022-03-17 PROCEDURE — 87076 CULTURE ANAEROBE IDENT EACH: CPT

## 2022-03-17 PROCEDURE — 96376 TX/PRO/DX INJ SAME DRUG ADON: CPT

## 2022-03-17 PROCEDURE — 85025 COMPLETE CBC W/AUTO DIFF WBC: CPT

## 2022-03-17 PROCEDURE — 160038 HCHG SURGERY MINUTES - EA ADDL 1 MIN LEVEL 2: Performed by: UROLOGY

## 2022-03-17 PROCEDURE — 770006 HCHG ROOM/CARE - MED/SURG/GYN SEMI*

## 2022-03-17 RX ORDER — HYDROMORPHONE HYDROCHLORIDE 2 MG/ML
0.1 INJECTION, SOLUTION INTRAMUSCULAR; INTRAVENOUS; SUBCUTANEOUS
Status: DISCONTINUED | OUTPATIENT
Start: 2022-03-17 | End: 2022-03-17 | Stop reason: HOSPADM

## 2022-03-17 RX ORDER — HYDROMORPHONE HYDROCHLORIDE 2 MG/ML
0.4 INJECTION, SOLUTION INTRAMUSCULAR; INTRAVENOUS; SUBCUTANEOUS
Status: DISCONTINUED | OUTPATIENT
Start: 2022-03-17 | End: 2022-03-17 | Stop reason: HOSPADM

## 2022-03-17 RX ORDER — LIDOCAINE HYDROCHLORIDE 20 MG/ML
INJECTION, SOLUTION EPIDURAL; INFILTRATION; INTRACAUDAL; PERINEURAL PRN
Status: DISCONTINUED | OUTPATIENT
Start: 2022-03-17 | End: 2022-03-17 | Stop reason: SURG

## 2022-03-17 RX ORDER — DEXAMETHASONE SODIUM PHOSPHATE 4 MG/ML
INJECTION, SOLUTION INTRA-ARTICULAR; INTRALESIONAL; INTRAMUSCULAR; INTRAVENOUS; SOFT TISSUE PRN
Status: DISCONTINUED | OUTPATIENT
Start: 2022-03-17 | End: 2022-03-17 | Stop reason: HOSPADM

## 2022-03-17 RX ORDER — IPRATROPIUM BROMIDE AND ALBUTEROL SULFATE 2.5; .5 MG/3ML; MG/3ML
3 SOLUTION RESPIRATORY (INHALATION)
Status: DISCONTINUED | OUTPATIENT
Start: 2022-03-17 | End: 2022-03-17 | Stop reason: HOSPADM

## 2022-03-17 RX ORDER — KETAMINE HYDROCHLORIDE 50 MG/ML
INJECTION, SOLUTION INTRAMUSCULAR; INTRAVENOUS PRN
Status: DISCONTINUED | OUTPATIENT
Start: 2022-03-17 | End: 2022-03-17 | Stop reason: SURG

## 2022-03-17 RX ORDER — OXYCODONE HYDROCHLORIDE 5 MG/1
2.5 TABLET ORAL
Status: DISCONTINUED | OUTPATIENT
Start: 2022-03-17 | End: 2022-03-18

## 2022-03-17 RX ORDER — OXYCODONE HCL 5 MG/5 ML
5 SOLUTION, ORAL ORAL
Status: COMPLETED | OUTPATIENT
Start: 2022-03-17 | End: 2022-03-17

## 2022-03-17 RX ORDER — ROCURONIUM BROMIDE 10 MG/ML
INJECTION, SOLUTION INTRAVENOUS PRN
Status: DISCONTINUED | OUTPATIENT
Start: 2022-03-17 | End: 2022-03-17 | Stop reason: SURG

## 2022-03-17 RX ORDER — GABAPENTIN 300 MG/1
600 CAPSULE ORAL ONCE
Status: COMPLETED | OUTPATIENT
Start: 2022-03-17 | End: 2022-03-17

## 2022-03-17 RX ORDER — LABETALOL HYDROCHLORIDE 5 MG/ML
5 INJECTION, SOLUTION INTRAVENOUS
Status: DISCONTINUED | OUTPATIENT
Start: 2022-03-17 | End: 2022-03-17 | Stop reason: HOSPADM

## 2022-03-17 RX ORDER — OXYCODONE HCL 5 MG/5 ML
10 SOLUTION, ORAL ORAL
Status: COMPLETED | OUTPATIENT
Start: 2022-03-17 | End: 2022-03-17

## 2022-03-17 RX ORDER — METOPROLOL TARTRATE 1 MG/ML
INJECTION, SOLUTION INTRAVENOUS PRN
Status: DISCONTINUED | OUTPATIENT
Start: 2022-03-17 | End: 2022-03-17 | Stop reason: SURG

## 2022-03-17 RX ORDER — ONDANSETRON 2 MG/ML
INJECTION INTRAMUSCULAR; INTRAVENOUS PRN
Status: DISCONTINUED | OUTPATIENT
Start: 2022-03-17 | End: 2022-03-17 | Stop reason: SURG

## 2022-03-17 RX ORDER — DIPHENHYDRAMINE HYDROCHLORIDE 50 MG/ML
12.5 INJECTION INTRAMUSCULAR; INTRAVENOUS
Status: DISCONTINUED | OUTPATIENT
Start: 2022-03-17 | End: 2022-03-17 | Stop reason: HOSPADM

## 2022-03-17 RX ORDER — SODIUM CHLORIDE 9 MG/ML
INJECTION, SOLUTION INTRAVENOUS CONTINUOUS
Status: DISCONTINUED | OUTPATIENT
Start: 2022-03-17 | End: 2022-03-17 | Stop reason: HOSPADM

## 2022-03-17 RX ORDER — CELECOXIB 200 MG/1
400 CAPSULE ORAL ONCE
Status: COMPLETED | OUTPATIENT
Start: 2022-03-17 | End: 2022-03-17

## 2022-03-17 RX ORDER — HYDROMORPHONE HYDROCHLORIDE 2 MG/ML
0.2 INJECTION, SOLUTION INTRAMUSCULAR; INTRAVENOUS; SUBCUTANEOUS
Status: DISCONTINUED | OUTPATIENT
Start: 2022-03-17 | End: 2022-03-17 | Stop reason: HOSPADM

## 2022-03-17 RX ORDER — MEPERIDINE HYDROCHLORIDE 50 MG/ML
12.5 INJECTION INTRAMUSCULAR; INTRAVENOUS; SUBCUTANEOUS
Status: DISCONTINUED | OUTPATIENT
Start: 2022-03-17 | End: 2022-03-17 | Stop reason: HOSPADM

## 2022-03-17 RX ORDER — ACETAMINOPHEN 500 MG
1000 TABLET ORAL ONCE
Status: COMPLETED | OUTPATIENT
Start: 2022-03-17 | End: 2022-03-17

## 2022-03-17 RX ORDER — ONDANSETRON 2 MG/ML
4 INJECTION INTRAMUSCULAR; INTRAVENOUS
Status: DISCONTINUED | OUTPATIENT
Start: 2022-03-17 | End: 2022-03-17 | Stop reason: HOSPADM

## 2022-03-17 RX ORDER — HYDRALAZINE HYDROCHLORIDE 20 MG/ML
5 INJECTION INTRAMUSCULAR; INTRAVENOUS
Status: DISCONTINUED | OUTPATIENT
Start: 2022-03-17 | End: 2022-03-17 | Stop reason: HOSPADM

## 2022-03-17 RX ORDER — SODIUM CHLORIDE, SODIUM LACTATE, POTASSIUM CHLORIDE, CALCIUM CHLORIDE 600; 310; 30; 20 MG/100ML; MG/100ML; MG/100ML; MG/100ML
INJECTION, SOLUTION INTRAVENOUS CONTINUOUS
Status: ACTIVE | OUTPATIENT
Start: 2022-03-17 | End: 2022-03-17

## 2022-03-17 RX ORDER — DEXTROSE MONOHYDRATE 25 G/50ML
12.5 INJECTION, SOLUTION INTRAVENOUS
Status: DISCONTINUED | OUTPATIENT
Start: 2022-03-17 | End: 2022-03-17 | Stop reason: HOSPADM

## 2022-03-17 RX ORDER — HYDROMORPHONE HYDROCHLORIDE 1 MG/ML
1 INJECTION, SOLUTION INTRAMUSCULAR; INTRAVENOUS; SUBCUTANEOUS
Status: DISCONTINUED | OUTPATIENT
Start: 2022-03-17 | End: 2022-03-18

## 2022-03-17 RX ORDER — HALOPERIDOL 5 MG/ML
1 INJECTION INTRAMUSCULAR
Status: DISCONTINUED | OUTPATIENT
Start: 2022-03-17 | End: 2022-03-17 | Stop reason: HOSPADM

## 2022-03-17 RX ORDER — HYDROMORPHONE HYDROCHLORIDE 2 MG/ML
INJECTION, SOLUTION INTRAMUSCULAR; INTRAVENOUS; SUBCUTANEOUS PRN
Status: DISCONTINUED | OUTPATIENT
Start: 2022-03-17 | End: 2022-03-17 | Stop reason: SURG

## 2022-03-17 RX ORDER — OXYCODONE HYDROCHLORIDE 5 MG/1
5 TABLET ORAL
Status: DISCONTINUED | OUTPATIENT
Start: 2022-03-17 | End: 2022-03-18

## 2022-03-17 RX ADMIN — KETAMINE HYDROCHLORIDE 50 MG: 50 INJECTION INTRAMUSCULAR; INTRAVENOUS at 15:06

## 2022-03-17 RX ADMIN — HYDROMORPHONE HYDROCHLORIDE 1 MG: 1 INJECTION, SOLUTION INTRAMUSCULAR; INTRAVENOUS; SUBCUTANEOUS at 21:49

## 2022-03-17 RX ADMIN — PIPERACILLIN AND TAZOBACTAM 4.5 G: 4; .5 INJECTION, POWDER, LYOPHILIZED, FOR SOLUTION INTRAVENOUS; PARENTERAL at 06:09

## 2022-03-17 RX ADMIN — ACETAMINOPHEN, ASPIRIN AND CAFFEINE 1 TABLET: 250; 250; 65 TABLET, FILM COATED ORAL at 09:45

## 2022-03-17 RX ADMIN — PROPOFOL 100 MG: 10 INJECTION, EMULSION INTRAVENOUS at 15:24

## 2022-03-17 RX ADMIN — ONDANSETRON 4 MG: 2 INJECTION INTRAMUSCULAR; INTRAVENOUS at 15:25

## 2022-03-17 RX ADMIN — DEXAMETHASONE SODIUM PHOSPHATE 4 MG: 4 INJECTION, SOLUTION INTRAMUSCULAR; INTRAVENOUS at 15:06

## 2022-03-17 RX ADMIN — PROPOFOL 250 MG: 10 INJECTION, EMULSION INTRAVENOUS at 15:06

## 2022-03-17 RX ADMIN — PROPOFOL 50 MG: 10 INJECTION, EMULSION INTRAVENOUS at 15:29

## 2022-03-17 RX ADMIN — ACETAMINOPHEN 1000 MG: 500 TABLET, FILM COATED ORAL at 14:55

## 2022-03-17 RX ADMIN — CELECOXIB 400 MG: 200 CAPSULE ORAL at 14:55

## 2022-03-17 RX ADMIN — LIDOCAINE HYDROCHLORIDE 100 MG: 20 INJECTION, SOLUTION EPIDURAL; INFILTRATION; INTRACAUDAL; PERINEURAL at 15:06

## 2022-03-17 RX ADMIN — HYDROMORPHONE HYDROCHLORIDE 1 MG: 2 INJECTION INTRAMUSCULAR; INTRAVENOUS; SUBCUTANEOUS at 15:05

## 2022-03-17 RX ADMIN — SUGAMMADEX 200 MG: 100 INJECTION, SOLUTION INTRAVENOUS at 15:25

## 2022-03-17 RX ADMIN — PIPERACILLIN AND TAZOBACTAM 4.5 G: 4; .5 INJECTION, POWDER, LYOPHILIZED, FOR SOLUTION INTRAVENOUS; PARENTERAL at 20:33

## 2022-03-17 RX ADMIN — HYDROMORPHONE HYDROCHLORIDE 1 MG: 1 INJECTION, SOLUTION INTRAMUSCULAR; INTRAVENOUS; SUBCUTANEOUS at 17:46

## 2022-03-17 RX ADMIN — OXYCODONE HYDROCHLORIDE 5 MG: 5 TABLET ORAL at 20:23

## 2022-03-17 RX ADMIN — FENTANYL CITRATE 50 MCG: 50 INJECTION, SOLUTION INTRAMUSCULAR; INTRAVENOUS at 16:12

## 2022-03-17 RX ADMIN — ACETAMINOPHEN 650 MG: 325 TABLET, FILM COATED ORAL at 08:52

## 2022-03-17 RX ADMIN — HYDROMORPHONE HYDROCHLORIDE 1 MG: 1 INJECTION, SOLUTION INTRAMUSCULAR; INTRAVENOUS; SUBCUTANEOUS at 12:04

## 2022-03-17 RX ADMIN — GABAPENTIN 600 MG: 300 CAPSULE ORAL at 14:55

## 2022-03-17 RX ADMIN — LIDOCAINE HYDROCHLORIDE 100 MG: 20 INJECTION, SOLUTION EPIDURAL; INFILTRATION; INTRACAUDAL; PERINEURAL at 15:29

## 2022-03-17 RX ADMIN — METOPROLOL TARTRATE 5 MG: 5 INJECTION INTRAVENOUS at 15:25

## 2022-03-17 RX ADMIN — SODIUM CHLORIDE, POTASSIUM CHLORIDE, SODIUM LACTATE AND CALCIUM CHLORIDE: 600; 310; 30; 20 INJECTION, SOLUTION INTRAVENOUS at 15:00

## 2022-03-17 RX ADMIN — HYDROMORPHONE HYDROCHLORIDE 0.5 MG: 1 INJECTION, SOLUTION INTRAMUSCULAR; INTRAVENOUS; SUBCUTANEOUS at 03:58

## 2022-03-17 RX ADMIN — HYDROMORPHONE HYDROCHLORIDE 0.5 MG: 1 INJECTION, SOLUTION INTRAMUSCULAR; INTRAVENOUS; SUBCUTANEOUS at 00:54

## 2022-03-17 RX ADMIN — SODIUM CHLORIDE, POTASSIUM CHLORIDE, SODIUM LACTATE AND CALCIUM CHLORIDE: 600; 310; 30; 20 INJECTION, SOLUTION INTRAVENOUS at 14:56

## 2022-03-17 RX ADMIN — HYDROMORPHONE HYDROCHLORIDE 0.5 MG: 1 INJECTION, SOLUTION INTRAMUSCULAR; INTRAVENOUS; SUBCUTANEOUS at 09:01

## 2022-03-17 RX ADMIN — PIPERACILLIN AND TAZOBACTAM 4.5 G: 4; .5 INJECTION, POWDER, LYOPHILIZED, FOR SOLUTION INTRAVENOUS; PARENTERAL at 12:56

## 2022-03-17 RX ADMIN — OXYCODONE HYDROCHLORIDE 10 MG: 5 SOLUTION ORAL at 16:08

## 2022-03-17 RX ADMIN — ROCURONIUM BROMIDE 50 MG: 10 INJECTION, SOLUTION INTRAVENOUS at 15:06

## 2022-03-17 RX ADMIN — INSULIN HUMAN 2 UNITS: 100 INJECTION, SOLUTION PARENTERAL at 20:40

## 2022-03-17 ASSESSMENT — ENCOUNTER SYMPTOMS
DIARRHEA: 0
SINUS PAIN: 0
CONSTIPATION: 0
DOUBLE VISION: 0
SHORTNESS OF BREATH: 0
DIZZINESS: 0
SPUTUM PRODUCTION: 0
NAUSEA: 0
BLURRED VISION: 0
PALPITATIONS: 0
NERVOUS/ANXIOUS: 0
COUGH: 0
FEVER: 0
HEADACHES: 1
SORE THROAT: 0
MYALGIAS: 0
CHILLS: 0
ABDOMINAL PAIN: 0
VOMITING: 0
WHEEZING: 0
FOCAL WEAKNESS: 0

## 2022-03-17 ASSESSMENT — PAIN DESCRIPTION - PAIN TYPE
TYPE: ACUTE PAIN
TYPE: ACUTE PAIN
TYPE: ACUTE PAIN;SURGICAL PAIN
TYPE: ACUTE PAIN;SURGICAL PAIN
TYPE: ACUTE PAIN
TYPE: ACUTE PAIN
TYPE: SURGICAL PAIN;ACUTE PAIN
TYPE: ACUTE PAIN
TYPE: ACUTE PAIN;SURGICAL PAIN
TYPE: SURGICAL PAIN
TYPE: ACUTE PAIN
TYPE: ACUTE PAIN;SURGICAL PAIN
TYPE: ACUTE PAIN

## 2022-03-17 ASSESSMENT — PAIN SCALES - GENERAL: PAIN_LEVEL: 3

## 2022-03-17 NOTE — ASSESSMENT & PLAN NOTE
Likely secondary to severe pain.  Multimodal pain control  Consider starting scheduled medications according to trend

## 2022-03-17 NOTE — CONSULTS
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  He has been on IV antibiotics as an inpatient at [facility] and then discharged with Augmentin therapy.    He was evaluated in the infectious disease office on 316 with concerns for worsening erythema and swelling in the right hemiscrotum.  He was sent to the emergency room.  He was evaluated with a CT scan ultrasound that showed a 5 cm fluid collection in the right hemiscrotum.  This does not appear to communicate with the perirectal space.    The patient has noted increasing pain.  He was not febrile     past medical history diabetes diagnosed in May 2021 sleep apnea obesity     surgical history history of bilateral inguinal hernia repairs.  He had an I&D of his right inguinal hernia incision May 2021 diagnosed with cellulitis at that point and managed with a wound VAC for 4 months.      Social history  does use marijuana occasional alcohol user     medications at home Augmentin clindamycin Metformin.  He has been started on heparin and Zosyn while hospitalized.    Allergies-morphine    Review of systems outlined in admission H&P not reiterated here    Examination reveals a obese male no acute distress pleasant and cooperative    Lungs clear    Cor exam regular rate and rhythm    Abdomen is soft there are bilateral inguinal incisions which are healed without erythema     examination reveals tenderness and swelling in the right dependent scrotum lateral to the scrotal raphae.  No skin changes.  No crepitus.  Phallus normal perineum without skin breakdown or erythema     impression    1.probable scrotal abscess    2.  Diabetes    3.  Obesity    Plan    1.  I have recommended incision and drainage of the scrotal abscess.  I  discussed packing of the wound with probable subsequent wound VAC thereafter.  He is familiar with the wound VAC as he had this for 4 months after previous I&D at [place] [facility].  Infectious diseases following along and will direct antibiotic therapy.    I discussed the indications risk benefits and alternatives to incision and drainage and he would like to proceed.  His wife was present for the consultation as well.

## 2022-03-17 NOTE — ANESTHESIA POSTPROCEDURE EVALUATION
Patient: Connor Jacinto    Procedure Summary     Date: 03/17/22 Room / Location:  OR 03 / SURGERY [facility]    Anesthesia Start: 1500 Anesthesia Stop: 1544    Procedure: INCISION AND DRAINAGE, SCROTUM Diagnosis:     Surgeons: James Adams M.D. Responsible Provider: Gilmer Holliday M.D.    Anesthesia Type: general ASA Status: 3 - Emergent          Final Anesthesia Type: general  Last vitals  BP   Blood Pressure: 151/80    Temp   36.7 °C (98 °F)    Pulse   68   Resp   18    SpO2   96 %      Anesthesia Post Evaluation    Patient location during evaluation: PACU  Patient participation: complete - patient participated  Level of consciousness: sleepy but conscious  Pain score: 3    Airway patency: patent  Anesthetic complications: no  Cardiovascular status: hemodynamically stable  Respiratory status: acceptable  Hydration status: balanced  Comments: Has CPAP with him to use as deemed necessary    PONV: none          No complications documented.     Nurse Pain Score: 3 (NPRS)

## 2022-03-17 NOTE — PROGRESS NOTES
Hospital Medicine Daily Progress Note    Date of Service  3/17/2022    Chief Complaint  Connor Jacinto is a 36 y.o. male admitted 3/16/2022 with groin infection since approx 3/4/22    Hospital Course  A 36 y.o. male with a past medical history of diabetes, obesity, recent perineal infection who follows with [facility] infectious disease, Dr. Analisa Hidalgo , who presented 3/16/2022 with worsening perineal pain and swelling.  Patient has been having perineal pain swelling and chills over the past week.  He was hospitalized at [facility] and received antibiotics intravenously and was discharged ( AMA ) on orals however his symptoms continue to worsen.  He was seen by his infectious disease doctor on 3/16 and was told to go directly to the hospital for intravenous antibiotics.     Interval Problem Update  Patient was seen and examined at bedside.  Patient's wife at bedside.  C/o of headache. Excedrin once.   Patient is n.p.o. -waiting for surgery evaluation.  IVF   Pain control   IV Zosyn   Discussed with both infectious disease and general surgery, ID will evaluate the patient today and general surgery recommended CT to further evaluate the abscess.   Scrotal ultrasound also ordered    I have personally seen and examined the patient at bedside. I discussed the plan of care with patient, family, bedside RN, charge RN, , pharmacy and general surgery and infectious disease.    Consultants/Specialty  general surgery and infectious disease    Code Status  Full Code    Disposition  Patient is not medically cleared for discharge.   Anticipate discharge to to home with close outpatient follow-up.  I have placed the appropriate orders for post-discharge needs.    Review of Systems  Review of Systems   Constitutional: Positive for malaise/fatigue. Negative for chills and fever.   HENT: Negative for congestion, ear discharge, ear pain, sinus pain and sore throat.    Eyes: Negative for blurred vision and double vision.    Respiratory: Negative for cough, sputum production, shortness of breath and wheezing.    Cardiovascular: Negative for chest pain, palpitations and leg swelling.   Gastrointestinal: Negative for abdominal pain, constipation, diarrhea, nausea and vomiting.   Genitourinary: Negative for dysuria, frequency and urgency.        Perineal pain and swelling   Musculoskeletal: Negative for myalgias.   Neurological: Positive for headaches. Negative for dizziness and focal weakness.   Psychiatric/Behavioral: The patient is not nervous/anxious.         Physical Exam  Temp:  [36.4 °C (97.5 °F)-37.1 °C (98.8 °F)] 37.1 °C (98.8 °F)  Pulse:  [] 65  Resp:  [18] 18  BP: (132-165)/() 132/72  SpO2:  [95 %-97 %] 97 %    Physical Exam  Constitutional:       General: He is not in acute distress.     Appearance: He is obese.   HENT:      Head: Normocephalic and atraumatic.      Nose: Nose normal.      Mouth/Throat:      Mouth: Mucous membranes are moist.      Pharynx: No posterior oropharyngeal erythema.   Eyes:      General: No scleral icterus.     Extraocular Movements: Extraocular movements intact.      Conjunctiva/sclera: Conjunctivae normal.      Pupils: Pupils are equal, round, and reactive to light.   Cardiovascular:      Rate and Rhythm: Normal rate and regular rhythm.      Pulses: Normal pulses.      Heart sounds: Normal heart sounds. No murmur heard.    No gallop.   Pulmonary:      Effort: Pulmonary effort is normal.      Breath sounds: Normal breath sounds. No stridor. No wheezing, rhonchi or rales.   Abdominal:      General: Bowel sounds are normal.      Palpations: Abdomen is soft.   Genitourinary:     Comments: Perineal pain and swelling  Musculoskeletal:         General: No swelling or tenderness.      Cervical back: Normal range of motion and neck supple. No rigidity.   Skin:     General: Skin is warm.   Neurological:      General: No focal deficit present.      Mental Status: He is alert and oriented to  person, place, and time.   Psychiatric:         Mood and Affect: Mood normal.         Behavior: Behavior normal.         Fluids    Intake/Output Summary (Last 24 hours) at 3/17/2022 1030  Last data filed at 3/17/2022 0400  Gross per 24 hour   Intake 1462.83 ml   Output --   Net 1462.83 ml       Laboratory  Recent Labs     03/16/22  1508 03/17/22  0430   WBC 9.1 7.5   RBC 5.27 4.88   HEMOGLOBIN 15.0 13.6*   HEMATOCRIT 44.9 42.5   MCV 85.2 87.1   MCH 28.5 27.9   MCHC 33.4* 32.0*   RDW 38.3 40.0   PLATELETCT 302 246   MPV 8.1* 8.7*     Recent Labs     03/16/22  1508 03/17/22  0430   SODIUM 138 134*   POTASSIUM 3.9 3.7   CHLORIDE 101 100   CO2 22 18*   GLUCOSE 212* 152*   BUN 10 9   CREATININE 0.75 0.71   CALCIUM 9.5 9.1                   Imaging  [id number]   Final Result      1.  No evidence of testicular mass or torsion.      2.  Complex fluid collection involving the low right perineum extending into the inferior scrotal sac measuring 5.6 x 2.9 x 2.5 cm in size possibly representing abscess.      CT-ABDOMEN-PELVIS W/O   Final Result      1.  Inflammatory changes at the right perineum tracking to the right scrotum with 5.7 x 1.8 cm fluid collection, consistent with cellulitis in the correct clinical setting. There is no soft tissue gas to confirm Iveth's gangrene, however Iveth's    gangrene cannot be excluded.   2.  Likely reactive bilateral inguinal lymphadenopathy.      DX-CHEST-PORTABLE (1 VIEW)   Final Result      1.  There is no acute cardiopulmonary process.           Assessment/Plan  * Perineal abscess- (present on admission)  Assessment & Plan  No crepitus on examination to suggest Iveth's gangrene  Sent by [facility], Dr Analisa Hidalgo for intravenous antibiotics.  Patient is n.p.o. -waiting for surgery evaluation.  IVF   Pain control   IV Zosyn   Discussed with both infectious disease and general surgery, ID will evaluate the patient today and general surgery recommended  CT to further evaluate the abscess.   Scrotal ultrasound also ordered      Lactic acidosis- (present on admission)  Assessment & Plan  Likely secondary to reduced intravascular volume secondary to dehydration.  Resolved with IVF     Elevated blood pressure reading- (present on admission)  Assessment & Plan  Likely secondary to severe pain.  Multimodal pain control  Consider starting scheduled medications according to trend    Dehydration- (present on admission)  Assessment & Plan  Likely secondary to reduced oral intake and increased insensible losses.  Encourage oral intake as tolerated, antiemetics as needed.  IVF    DM (diabetes mellitus) (HCC)- (present on admission)  Assessment & Plan  With hyperglycemia  Last glycated hemoglobin was 11.2 %  ISS  Accu-Checks, hypoglycemia protocol         VTE prophylaxis: SCDs/TEDs and pharmacologic prophylaxis contraindicated due to Possible Surgery     I have performed a physical exam and reviewed and updated ROS and Plan today (3/17/2022). In review of yesterday's note (3/16/2022), there are no changes except as documented above.

## 2022-03-17 NOTE — PROGRESS NOTES
4 Eyes Skin Assessment Completed by Laila, RN and Elijah, RN.    Head WDL  Ears WDL  Nose WDL  Mouth WDL  Neck Redness (baseline per pt)  Breast/Chest Redness (baseline per pt)  Shoulder Blades Redness (baseline per pt)  Spine WDL  (R) Arm/Elbow/Hand WDL  (L) Arm/Elbow/Hand WDL  Abdomen Redness (baseline per pt)  Groin WDL  Scrotum/Coccyx/Buttocks admitted for perineal swelling, no open areas noted  (R) Leg WDL  (L) Leg WDL  (R) Heel/Foot/Toe WDL  (L) Heel/Foot/Toe WDL          Devices In Places home cpap at night      Interventions In Place N/A    Possible Skin Injury No    Pictures Uploaded Into Epic N/A  Wound Consult Placed N/A  RN Wound Prevention Protocol Ordered No

## 2022-03-17 NOTE — PROGRESS NOTES
Infectious Diseases    Pt seen and examined.  Recent admission to [facility].  Had two small perineal abscesses.  Aspirate with Grp B Streptococcus.  Had IV therapy x 3 days, then transitioned to oral Augmentin and Clindamycin.    Seen in office yesterday, with increased pain swelling.  CT with + fluid collection.    Failed oral abx, will need IV.  Surgery to evaluate.  Ideally would like to see abscess drained.

## 2022-03-17 NOTE — H&P
"Hospital Medicine History & Physical Note    Date of Service  3/16/2022    Primary Care Physician  Yolanda Patel M.D.    Consultants  General surgery, Dr. Jaime    Code Status  Full Code    Chief Complaint  Chief Complaint   Patient presents with   • Groin Pain     Has had a groin infection since approx 3/4/22  Admitted to [facility] 3/5 and left AMA due to a \" bad experience \"   On augmentin currently  Followed by ID and told to come to ED       History of Presenting Illness  Connor Jacinto is a 36 y.o. male with a past medical history of diabetes, obesity, recent perineal infection who follows with serial infectious disease, Dr. Analisa Hidalgo who presented 3/16/2022 with worsening perineal pain and swelling.  Patient has been having perineal pain swelling and chills over the past week.  He was hospitalized at [facility] and received antibiotics intravenously and was discharged on orals however his symptoms continue to worsen.  He was seen by his infectious disease doctor today and was told to go directly to the hospital for intravenous antibiotics.      I discussed the plan of care with emergency department physician, the patient and patient's wife was present at bedside in the emergency room.    Review of Systems  Review of Systems   Constitutional: Positive for chills. Negative for fever.   Eyes: Negative for discharge and redness.   Respiratory: Negative for cough, shortness of breath and stridor.    Cardiovascular: Negative for chest pain and leg swelling.   Gastrointestinal: Negative for abdominal pain and vomiting.   Genitourinary: Negative for flank pain.        Perineal pain and swelling   Musculoskeletal: Negative for myalgias.   Skin: Negative.    Neurological: Negative for focal weakness.   Endo/Heme/Allergies: Does not bruise/bleed easily.   Psychiatric/Behavioral: The patient is not nervous/anxious.      Past Medical History   has a past medical history of Chickenpox, Diabetes (HCC), Obesity, " and Sleep apnea.    Surgical History   has a past surgical history that includes knee reconstruction and hernia repair.     Family History  family history includes Diabetes in his father, maternal grandmother, and mother; Heart Disease in his father; Hypertension in his father; Lung Disease in his father.      Social History   reports that he has quit smoking. His smoking use included cigarettes. His smokeless tobacco use includes chew. He reports current alcohol use. He reports current drug use. Drug: Inhaled.    Allergies  Allergies   Allergen Reactions   • Morphine      Pt reports that its makes his body feel on fire      Medications  Prior to Admission Medications   Prescriptions Last Dose Informant Patient Reported? Taking?   amoxicillin-clavulanate (AUGMENTIN) 500-125 MG Tab 3/16/2022 at 1200 Patient's Home Pharmacy Yes Yes   Sig: Take 1 Tablet by mouth 3 times a day. Pt started on 3/11/2022 for 10 day course   clindamycin (CLEOCIN) 300 MG Cap 3/16/2022 at 1200 Patient's Home Pharmacy Yes Yes   Sig: Take 300 mg by mouth 3 times a day. Pt started on 3/11/2022 for 14 day course   metFORMIN (GLUCOPHAGE) 500 MG Tab 3/16/2022 at 0900 Patient's Home Pharmacy Yes No   Sig: Take 500 mg by mouth 2 times a day with meals.      Facility-Administered Medications: None     Physical Exam  Temp:  [36.4 °C (97.5 °F)] 36.4 °C (97.5 °F)  Pulse:  [] 93  Resp:  [18] 18  BP: (150-165)/() 165/83  SpO2:  [95 %-96 %] 95 %  Blood Pressure: (!) 165/83   Temperature: 36.4 °C (97.5 °F)   Pulse: 93   Respiration: 18   Pulse Oximetry: 95 %     Physical Exam  Constitutional:       General: He is not in acute distress.     Appearance: He is obese. He is not ill-appearing or diaphoretic.   HENT:      Head: Atraumatic.      Right Ear: External ear normal.      Left Ear: External ear normal.      Nose: No congestion or rhinorrhea.      Mouth/Throat:      Mouth: Mucous membranes are moist.   Eyes:      General: No scleral icterus.         Right eye: No discharge.         Left eye: No discharge.      Pupils: Pupils are equal, round, and reactive to light.   Cardiovascular:      Rate and Rhythm: Normal rate and regular rhythm.   Pulmonary:      Effort: Pulmonary effort is normal.   Abdominal:      General: There is no distension.   Genitourinary:     Comments: Perineal pain and swelling  Musculoskeletal:      Cervical back: Neck supple. No rigidity. No muscular tenderness.      Right lower leg: No edema.      Left lower leg: No edema.   Skin:     Coloration: Skin is not jaundiced or pale.   Neurological:      Mental Status: He is alert and oriented to person, place, and time.      Coordination: Coordination normal.   Psychiatric:         Mood and Affect: Mood normal.         Behavior: Behavior normal.       Laboratory:  Recent Labs     03/16/22  1508   WBC 9.1   RBC 5.27   HEMOGLOBIN 15.0   HEMATOCRIT 44.9   MCV 85.2   MCH 28.5   MCHC 33.4*   RDW 38.3   PLATELETCT 302   MPV 8.1*     Recent Labs     03/16/22  1508   SODIUM 138   POTASSIUM 3.9   CHLORIDE 101   CO2 22   GLUCOSE 212*   BUN 10   CREATININE 0.75   CALCIUM 9.5     Recent Labs     03/16/22  1508   ALTSGPT 156*   ASTSGOT 80*   ALKPHOSPHAT 115*   TBILIRUBIN 0.4   GLUCOSE 212*         No results for input(s): NTPROBNP in the last 72 hours.      No results for input(s): TROPONINT in the last 72 hours.    Imaging:  DX-CHEST-PORTABLE (1 VIEW)   Final Result      1.  There is no acute cardiopulmonary process.        Assessment/Plan:  I anticipate this patient is appropriate for observation status at this time.    * Perineal abscess- (present on admission)  Assessment & Plan  No crepitus on examination to suggest Iveth's gangrene  Sent by [facility] infectious disease, Dr Analisa Hidalgo for intravenous antibiotics.  Per patient, Dr. Hidalgo will see him tomorrow.  In the emergency room, emergency department physician obtained a bedside ultrasound that showed a 4-4 cm abscess and consulted  general surgery, Dr. Jaime will see tomorrow  We will start Zosyn    Lactic acidosis- (present on admission)  Assessment & Plan  Likely secondary to reduced intravascular volume secondary to dehydration.  Expect improvement with intravenous fluids and rehydration    Elevated blood pressure reading- (present on admission)  Assessment & Plan  Likely secondary to severe pain.  Multimodal pain control  Consider starting scheduled medications according to trend    Dehydration- (present on admission)  Assessment & Plan  Likely secondary to reduced oral intake and increased insensible losses.  Encourage oral intake as tolerated, antiemetics as needed.  Gentle intravenous hydration until adequate oral intake is achieved    DM (diabetes mellitus) (HCC)- (present on admission)  Assessment & Plan  With hyperglycemia  Last glycated hemoglobin was 11.2 %  I will start short acting insulin  Accu-Checks, hypoglycemia protocol      VTE prophylaxis: SCDs/TEDs and heparin ppx

## 2022-03-17 NOTE — PROGRESS NOTES
Patient is a 76-year-old male who was admitted on 3/6 with a perineal abscess and history of Iveth's gangrene.  Patient has been evaluated by infectious disease who recommended IV antibiotics as he has already failed outpatient antibiotics.  Additionally patient also have poorly controlled diabetes.  Given patient's failure of outpatient therapy, history of Migel's gangrene, uncontrolled diabetes he is at a high risk of worsening.  I reviewed this case and discussed with Dr. Cardona, and at this time patient cannot be safely managed in the outpatient setting as he is requiring ongoing IV antibiotics, has high risk of worsening, and has also been requiring IV pain medication for uncontrolled pain.    Recommendation: Inpatient

## 2022-03-17 NOTE — ANESTHESIA PREPROCEDURE EVALUATION
Case: [id number] Date/Time: 03/17/22 1405    Procedure: INCISION AND DRAINAGE, SCROTUM    Location: [facility] OR 03 / SURGERY [facility]    Surgeons: James Adams M.D.          Relevant Problems   ANESTHESIA   (positive) DONAL (obstructive sleep apnea)      Other   (positive) DM (diabetes mellitus) (HCC)   (positive) Elevated blood pressure reading   (positive) Lactic acidosis   (positive) Morbid obesity (HCC)   (positive) Perineal abscess   - Severe DONAL. CPAP on standby.    Physical Exam    Airway   Mallampati: III  TM distance: >3 FB  Neck ROM: full       Cardiovascular - normal exam  Rhythm: regular  Rate: normal  (-) murmur     Dental - normal exam        Facial Hair   Pulmonary - normal exam  Breath sounds clear to auscultation     Abdominal   (+) obese     Neurological - normal exam               Anesthesia Plan    ASA 3- EMERGENT   ASA physical status 3 criteria: morbid obesity - BMI greater than or equal to 40ASA physical status emergent criteria: acutely contaminated wound or identified infection source    Plan - general       Airway plan will be ETT          Induction: intravenous    Postoperative Plan: Postoperative administration of opioids is intended.    Pertinent diagnostic labs and testing reviewed    Informed Consent:    Anesthetic plan and risks discussed with patient.

## 2022-03-17 NOTE — CONSULTS
DATE OF SERVICE:  03/17/2022     INFECTIOUS DISEASE CONSULTATION     REFERRING PROVIDER:  Demario Ramirez MD     REASON FOR CONSULTATION:  Evaluation and antibiotic management of a   36-year-old male with a perineal abscess, status post failure of oral therapy.     HISTORY OF PRESENT ILLNESS:  The patient is a 36-year-old male familiar to   [facility] Infectious Diseases.  The patient was recently seen at [facility].  He   was admitted on 03/06 with perineal cellulitis with a history of Iveth's   gangrene in 05/2021.  At [facility], there were 2 golf ball-size abscesses.    They were aspirated.  They grew a group B Streptococcus.  He was on   intravenous antibiotics and was subsequently discharged on Augmentin 500 mg   p.o. t.i.d. as well as clindamycin.  I believe while at [facility], it was   recommended he be on intravenous antibiotics, but the patient did not want to   do that.  He was seen in our office yesterday by Dr. Hidalgo and he was   concerned as he was having increased erythema and swelling of the area.  We   recommended he go to the emergency room.  The patient had previously been   admitted at [facility] and he preferred to go to another facility due to the   fact that he felt that the nursing services there were inadequate.  The   patient was seen in the Emergency Room yesterday.  A bedside ultrasound done   by Dr. Ramirez was suggestive of a 3x3 cm abscess.  He just returned from a CT   scan and general surgery was consulted and will see him following the CT   scan.  The patient has been kept n.p.o. in the event that he goes to surgery   this afternoon.  He was started on empiric Zosyn yesterday.     PAST MEDICAL HISTORY:  Diabetes, just recently diagnosed 05/2021.  His recent   hemoglobin A1c was 11.2.  He is only on metformin, which he takes   inconsistently.  History of obesity, sleep apnea.     PAST SURGICAL HISTORY:  He has had bilateral hernia repairs.  He developed an   abscess adjacent to a  hernia repair in 05/2021 and was subsequently diagnosed   with Iveth's gangrene.  I do not have those records available to me at this   time.  Knee reconstruction and bilateral hernia repair.     SOCIAL HISTORY:  He is , inconsistent vaping with nicotine, CBD as well   as marijuana.  He does drink 3-4 times a month.  He is a  at [employer].     CURRENT MEDICATIONS:  Include Augmentin 500 mg p.o. t.i.d., clindamycin 300 mg   t.i.d. as well as metformin 500 mg b.i.d.     ALLERGIES:  HE IS ALLERGIC TO MORPHINE.  NO ALLERGIES TO ANTIBIOTICS.     PHYSICAL EXAMINATION:  VITAL SIGNS:  His temperature is currently 37.1, blood pressure is 132/72,   heart rate is 65, respiratory rate of 18.  GENERAL:  He is an obese gentleman.  HEENT:  Extraocular movements are intact.  Pupils are equal, round and   reactive to light.  Oropharynx is clear.  NECK:  Supple.  HEART:  Regular rate and rhythm.  LUNGS:  Clear to auscultation and percussion.  ABDOMEN:  Soft, nontender, and nondistended.  EXTREMITIES:  Without any edema.  GENITOURINARY:  He has a fluctuant, tender area in the perineal region   posterior to the scrotum. Scrotum itself does not have any abscesses. It does   feel fluctuant.  His wife believes that he had a small amount of bloody   drainage earlier this morning, but nothing can be expressed at this time.     LABORATORY DATA:  His white count is currently 7.5, hemoglobin of 13.6,   hematocrit of 42.5, platelets 246.  Chemistry panel:  Sodium 135, potassium of   3.7, chloride of 100, CO2 of 18, BUN of 9, creatinine of 0.71, glucose of   152.     DIAGNOSTIC DATA:  He had a CT done this morning, report notes inflammatory   changes of the right perineum tracking to the right scrotum with a 5.7x1.8 cm   fluid collection.  No gas at this time.     IMPRESSION:  1.  Relapse of perineal abscess.  2.  Recent diagnosis of perineal abscess with group B Streptococcus at [place] by aspiration.  3.  Diabetes, poorly  controlled.  4.  Obesity.     DISCUSSION:  At this point, the patient is currently on IV Zosyn.  He is to   have a surgical consult later today.  He has already failed oral antibiotics   and I would recommend the patient undergo surgical incision and drainage or at   least IR aspiration and then be placed on intravenous antibiotics.  Ideally,   the patient will likely need IV Unasyn versus Zosyn depending on repeat   cultures.  He will likely require PICC line and then authorization will need   to be obtained. Authorization from his insurance company may take up to 72   hours.     Thank you asking us to assist in management of this patient.        ______________________________  MD ALCON GREEN/HILDA    DD:  03/17/2022 11:17  DT:  03/17/2022 11:47    Job#:  [id number]    CC:MD HAZEL Sheridan MD Amanda C. Vanderclay, MD

## 2022-03-17 NOTE — ANESTHESIA TIME REPORT
Anesthesia Start and Stop Event Times     Date Time Event    3/17/2022 1433 Ready for Procedure     1500 Anesthesia Start     1544 Anesthesia Stop        Responsible Staff  03/17/22    Name Role Begin End    Gilmer Holliday M.D. Anesth 1500 1544        Preop Diagnosis (Free Text):  Pre-op Diagnosis             Preop Diagnosis (Codes):    Premium Reason  C. Post-1st,2nd,3rd,OB,RTC    Comments:

## 2022-03-17 NOTE — PROGRESS NOTES
Received bedside report.  Assumed pt care.   Assessment and chart check complete.  Pt is AA0X4, no signs of distress, denies nausea/vomiting  Pt pain currently 7/10, medicated per MAR.   NPO with sips of medications instructed.  IVF infusing.  Fall precautions in place, treaded socks on pt, bed in low position.   Call light within reach.   Educated pt to call if needing anything.   Hourly rounding.  1340- report given to pre op RN.  1350-Pt is sending for I and D. Off to floor via hospital bed.

## 2022-03-17 NOTE — CARE PLAN
The patient is Stable - Low risk of patient condition declining or worsening    Shift Goals  Clinical Goals: pt will report pain controlled as evidenced by being able to sleep    Progress made toward(s) clinical / shift goals:  Pt given dilaudid PO x1 with little relief, followed by Dilaudid IV x2 with good relief, pt able to sleep comfortably. VSSA. Wearing home cpap. NPO at this time.    Patient is not progressing towards the following goals: n/a

## 2022-03-17 NOTE — OR SURGEON
Immediate Post OP Note    PreOp Diagnosis: scrotal/perineal abscess      PostOp Diagnosis: same      Procedure(s):  INCISION AND DRAINAGE, SCROTUM    Surgeon(s):  James Adams M.D.    Anesthesiologist/Type of Anesthesia:  Anesthesiologist: Gilmer Holliday M.D./* No anesthesia type entered *    Surgical Staff:  Circulator: Ariela Stevenson R.N.  Scrub Person: Evan Ha    Specimens removed if any:  * No specimens in log *    Estimated Blood Loss: minimal    Findings:  Scrotal/perineal abscss cavity; cultures obtained    Complications: none        3/17/2022 3:28 PM James Adams M.D.

## 2022-03-17 NOTE — ASSESSMENT & PLAN NOTE
With hyperglycemia  Last glycated hemoglobin was 11.2 %  ISS  Accu-Checks, hypoglycemia protocol

## 2022-03-17 NOTE — ANESTHESIA PROCEDURE NOTES
Airway    Date/Time: 3/17/2022 3:07 PM  Performed by: Gilmer Holliday M.D.  Authorized by: Gilmer Holliday M.D.     Location:  OR  Urgency:  Elective  Indications for Airway Management:  Anesthesia      Spontaneous Ventilation: absent    Sedation Level:  Deep  Preoxygenated: Yes    Patient Position:  Sniffing  Mask Difficulty Assessment:  0 - not attempted  Final Airway Type:  Endotracheal airway  Final Endotracheal Airway:  ETT  Cuffed: Yes    Technique Used for Successful ETT Placement:  Video laryngoscopy  Devices/Methods Used in Placement:  Intubating stylet    Insertion Site:  Oral  Blade Type:  Glide  Laryngoscope Blade/Videolaryngoscope Blade Size:  4  ETT Size (mm):  8.0  Measured from:  Teeth  ETT to Teeth (cm):  22  Placement Verified by: auscultation and capnometry    Cormack-Lehane Classification:  Grade I - full view of glottis  Number of Attempts at Approach:  1

## 2022-03-17 NOTE — OR NURSING
1342: Rcvd report from ANIRUDH Hauser.  1353: Brought pt to pre-op holding via bed, wife at bedside. Pt tolerated transfer well, pain is tolerable, no nausea, awake and alert, on room air.  1459: Patient allergies and NPO status verified, home medication reconciliation completed and belongings secured. Patient verbalizes understanding of pain scale, expected course of stay and plan of care. Surgical site verified with patient. PIV patency veriified. Sequentials placed on legs.

## 2022-03-17 NOTE — ASSESSMENT & PLAN NOTE
Likely secondary to reduced oral intake and increased insensible losses.  Encourage oral intake as tolerated, antiemetics as needed.  IVF

## 2022-03-17 NOTE — OP REPORT
DATE OF SERVICE:  03/17/2022     PREOPERATIVE DIAGNOSES:  Right perineal and scrotal abscess.     POSTOPERATIVE DIAGNOSES:  Right perineal and scrotal abscess.     PROCEDURES:  Incision and drainage of perineal abscess, 5 cm cavity.     SURGEON:  James Adams MD     ASSISTANT:  None.     ANESTHESIOLOGIST:  Gilmer Holliday MD     TYPE OF ANESTHESIA:  General endotracheal tube.     INDICATIONS:  This 36-year-old male cared for by the infectious disease   service for a scrotal and perineal cellulitis had localized to an area   suspicious for scrotal abscess.  He was admitted to the hospital where CT and   ultrasound confirmed a 5 cm abscess cavity extending at the base of the right   scrotum into the perineum.  I and D was recommended.     FINDINGS:  There was purulent fluid-filled cavity.  This was opened.  Cultures   obtained.  Purulence evacuated and wound washed out with saline solution and   packed with iodoform gauze.     DESCRIPTION OF PROCEDURE:  The patient was identified in the holding area.  He   was taken to the operative suite where general endotracheal anesthesia was   administered by Dr. Holliday.  He was then positioned in the dorsal lithotomy   position, sterilely prepped and draped.  He had received preoperative Zosyn,   no additional antibiotics were administered.     The perineum was exposed.  Fluctuant area on the right side of the perineal   raphae was identified.  This was incised with a scalpel with the immediate   return of copious amounts of purulent drainage.  This was cultured.  I then   evacuated this manually and explored the cavity digitally breaking up small   septations.  I then liberally irrigated the cavity with saline solution.  It   was then packed with iodoform gauze and covered with an ABD pad.  Mesh panties   were used to hold this in place.  No injury to the urethra was noted.  The   patient was sent to recovery room in stable  condition.        ______________________________  MD IRASEMA Luna    DD:  03/17/2022 15:38  DT:  03/17/2022 16:15    Job#:  [id number]

## 2022-03-17 NOTE — DIETARY
"Nutrition services: Day 0 of admit.  Connor Jacinto is a 36 y.o. male with admitting DX of Perineal abscess    Consult received for MST of 4 on nutrition screen due to report of >/=34 lb wt loss x 6 weeks. No report of poor PO PTA.      Assessment:  Height: 175.3 cm (5' 9\")  Weight: (!) 158 kg (348 lb 5.2 oz)(stand up scale)  Body mass index is 51.44 kg/m²., BMI classification: class 3 (extreme) obesity  Diet/Intake: NPO for surgery evaluation    Evaluation:   1. HX includes diabetes, obesity, Iveth's gangrene, recent perineal infection  2. Pt was on a consistent CHO (diabetic) diet yesterday w/ PO of % at dinner recorded.   3. RD met with pt and wife in his room. Pt reports wt loss of 35-40 lb x 1 to 1 1/2 months. This is a 9% to 10% wt loss and is significant. Per pt and wife,pt has cut out rice, pasta (eating less than before), and bread. He is eating mainly protein and vegetables. This may have contributed to weight loss. Pt also has an infection with possible increased kcal needs. This may have also contributed to weight loss. Diet changes leading to wt loss most likely are positive.   4. Pt did not appear malnourished.   5. Pt with BMI >40 (=Body mass index is 51.44 kg/m².), Class III obesity. Weight loss counseling not appropriate in acute care setting.   6. Labs: 3/6/22: A1c=11.2 (H). 3/16 & 3/17: glucose accu-ch: 167, 150, 145  7. Meds: SSI    Malnutrition Risk: criteria not met.     Recommendations/Plan:   1. Initiate diet and advance beyond clears when medically feasible.   2. Encourage intake of >50%  3. Document intake of all meals as % taken in ADL's to provide interdisciplinary communication across all shifts.   4. Monitor weight.  5. Nutrition rep will continue to see patient for ongoing meal and snack preferences.  6. If appropriate at DC please refer to outpatient nutrition services for weight management.     RD will follow.    "

## 2022-03-17 NOTE — OR NURSING
1539: To PACU post scrotum I&D. Pt is extubated, breathing is spontaneous and unlabored.    1557: Pain is currently tolerable at 5/10.    1608: Medicated for pain of 7/10.    1623: Pain is currently tolerable at 3/10, no nausea.    1640: Pain remains tolerable. No nausea. Meets criteria for discharge.

## 2022-03-17 NOTE — ASSESSMENT & PLAN NOTE
No crepitus on examination to suggest Iveth's gangrene  Sent by [facility] infectious disease, Dr Analisa Hidalgo for intravenous antibiotics.  Reviewed CT A/P and a scrotal ultrasound.  IV Zosyn switched to IV Unasyn for ID on 3/18  status post incision and drainage right scrotal abscess by urology on 3/17  Wound care - no wound VAC needed, daily packing  Still on IV ABx & ID will decide the final ABx recommendation    PICC order placed by ID.

## 2022-03-18 LAB
ALBUMIN SERPL BCP-MCNC: 4.3 G/DL (ref 3.2–4.9)
ALBUMIN/GLOB SERPL: 1.3 G/DL
ALP SERPL-CCNC: 112 U/L (ref 30–99)
ALT SERPL-CCNC: 135 U/L (ref 2–50)
ANION GAP SERPL CALC-SCNC: 13 MMOL/L (ref 7–16)
AST SERPL-CCNC: 67 U/L (ref 12–45)
BASOPHILS # BLD AUTO: 0.5 % (ref 0–1.8)
BASOPHILS # BLD: 0.05 K/UL (ref 0–0.12)
BILIRUB SERPL-MCNC: 0.5 MG/DL (ref 0.1–1.5)
BUN SERPL-MCNC: 10 MG/DL (ref 8–22)
CALCIUM SERPL-MCNC: 9.4 MG/DL (ref 8.4–10.2)
CHLORIDE SERPL-SCNC: 100 MMOL/L (ref 96–112)
CO2 SERPL-SCNC: 21 MMOL/L (ref 20–33)
CREAT SERPL-MCNC: 0.58 MG/DL (ref 0.5–1.4)
EOSINOPHIL # BLD AUTO: 0.02 K/UL (ref 0–0.51)
EOSINOPHIL NFR BLD: 0.2 % (ref 0–6.9)
ERYTHROCYTE [DISTWIDTH] IN BLOOD BY AUTOMATED COUNT: 36.2 FL (ref 35.9–50)
GFR SERPLBLD CREATININE-BSD FMLA CKD-EPI: 128 ML/MIN/1.73 M 2
GLOBULIN SER CALC-MCNC: 3.3 G/DL (ref 1.9–3.5)
GLUCOSE BLD STRIP.AUTO-MCNC: 153 MG/DL (ref 65–99)
GLUCOSE BLD STRIP.AUTO-MCNC: 205 MG/DL (ref 65–99)
GLUCOSE BLD STRIP.AUTO-MCNC: 208 MG/DL (ref 65–99)
GLUCOSE BLD STRIP.AUTO-MCNC: 217 MG/DL (ref 65–99)
GLUCOSE SERPL-MCNC: 195 MG/DL (ref 65–99)
GRAM STN SPEC: NORMAL
HCT VFR BLD AUTO: 45 % (ref 42–52)
HGB BLD-MCNC: 15.2 G/DL (ref 14–18)
IMM GRANULOCYTES # BLD AUTO: 0.26 K/UL (ref 0–0.11)
IMM GRANULOCYTES NFR BLD AUTO: 2.4 % (ref 0–0.9)
LYMPHOCYTES # BLD AUTO: 1.35 K/UL (ref 1–4.8)
LYMPHOCYTES NFR BLD: 12.7 % (ref 22–41)
MCH RBC QN AUTO: 27.9 PG (ref 27–33)
MCHC RBC AUTO-ENTMCNC: 33.8 G/DL (ref 33.7–35.3)
MCV RBC AUTO: 82.6 FL (ref 81.4–97.8)
MONOCYTES # BLD AUTO: 0.33 K/UL (ref 0–0.85)
MONOCYTES NFR BLD AUTO: 3.1 % (ref 0–13.4)
NEUTROPHILS # BLD AUTO: 8.63 K/UL (ref 1.82–7.42)
NEUTROPHILS NFR BLD: 81.1 % (ref 44–72)
NRBC # BLD AUTO: 0 K/UL
NRBC BLD-RTO: 0 /100 WBC
PLATELET # BLD AUTO: 360 K/UL (ref 164–446)
PMV BLD AUTO: 8.7 FL (ref 9–12.9)
POTASSIUM SERPL-SCNC: 4.1 MMOL/L (ref 3.6–5.5)
PROT SERPL-MCNC: 7.6 G/DL (ref 6–8.2)
RBC # BLD AUTO: 5.45 M/UL (ref 4.7–6.1)
SIGNIFICANT IND 70042: NORMAL
SITE SITE: NORMAL
SODIUM SERPL-SCNC: 134 MMOL/L (ref 135–145)
SOURCE SOURCE: NORMAL
WBC # BLD AUTO: 10.6 K/UL (ref 4.8–10.8)

## 2022-03-18 PROCEDURE — 99233 SBSQ HOSP IP/OBS HIGH 50: CPT | Performed by: STUDENT IN AN ORGANIZED HEALTH CARE EDUCATION/TRAINING PROGRAM

## 2022-03-18 PROCEDURE — A9270 NON-COVERED ITEM OR SERVICE: HCPCS | Performed by: STUDENT IN AN ORGANIZED HEALTH CARE EDUCATION/TRAINING PROGRAM

## 2022-03-18 PROCEDURE — 85025 COMPLETE CBC W/AUTO DIFF WBC: CPT

## 2022-03-18 PROCEDURE — 700111 HCHG RX REV CODE 636 W/ 250 OVERRIDE (IP): Performed by: STUDENT IN AN ORGANIZED HEALTH CARE EDUCATION/TRAINING PROGRAM

## 2022-03-18 PROCEDURE — 700102 HCHG RX REV CODE 250 W/ 637 OVERRIDE(OP): Performed by: STUDENT IN AN ORGANIZED HEALTH CARE EDUCATION/TRAINING PROGRAM

## 2022-03-18 PROCEDURE — A9270 NON-COVERED ITEM OR SERVICE: HCPCS | Performed by: HOSPITALIST

## 2022-03-18 PROCEDURE — 82962 GLUCOSE BLOOD TEST: CPT | Mod: 91

## 2022-03-18 PROCEDURE — 80053 COMPREHEN METABOLIC PANEL: CPT

## 2022-03-18 PROCEDURE — 770006 HCHG ROOM/CARE - MED/SURG/GYN SEMI*

## 2022-03-18 PROCEDURE — 700111 HCHG RX REV CODE 636 W/ 250 OVERRIDE (IP): Performed by: INTERNAL MEDICINE

## 2022-03-18 PROCEDURE — 700111 HCHG RX REV CODE 636 W/ 250 OVERRIDE (IP): Performed by: HOSPITALIST

## 2022-03-18 PROCEDURE — 94660 CPAP INITIATION&MGMT: CPT

## 2022-03-18 PROCEDURE — 700102 HCHG RX REV CODE 250 W/ 637 OVERRIDE(OP): Performed by: HOSPITALIST

## 2022-03-18 PROCEDURE — 94760 N-INVAS EAR/PLS OXIMETRY 1: CPT

## 2022-03-18 PROCEDURE — 700105 HCHG RX REV CODE 258: Performed by: HOSPITALIST

## 2022-03-18 PROCEDURE — 36415 COLL VENOUS BLD VENIPUNCTURE: CPT

## 2022-03-18 PROCEDURE — 700105 HCHG RX REV CODE 258: Performed by: INTERNAL MEDICINE

## 2022-03-18 RX ORDER — OXYCODONE HYDROCHLORIDE 5 MG/1
5 TABLET ORAL
Status: DISCONTINUED | OUTPATIENT
Start: 2022-03-18 | End: 2022-03-22

## 2022-03-18 RX ORDER — OXYCODONE HYDROCHLORIDE 5 MG/1
5 TABLET ORAL
Status: DISCONTINUED | OUTPATIENT
Start: 2022-03-18 | End: 2022-03-18

## 2022-03-18 RX ORDER — LACTOBACILLUS RHAMNOSUS GG 10B CELL
1 CAPSULE ORAL
Status: DISCONTINUED | OUTPATIENT
Start: 2022-03-18 | End: 2022-03-22 | Stop reason: HOSPADM

## 2022-03-18 RX ORDER — KETOROLAC TROMETHAMINE 30 MG/ML
30 INJECTION, SOLUTION INTRAMUSCULAR; INTRAVENOUS EVERY 6 HOURS
Status: COMPLETED | OUTPATIENT
Start: 2022-03-18 | End: 2022-03-21

## 2022-03-18 RX ORDER — OXYCODONE HYDROCHLORIDE 10 MG/1
10 TABLET ORAL
Status: DISCONTINUED | OUTPATIENT
Start: 2022-03-18 | End: 2022-03-22

## 2022-03-18 RX ORDER — HYDROMORPHONE HYDROCHLORIDE 2 MG/ML
2 INJECTION, SOLUTION INTRAMUSCULAR; INTRAVENOUS; SUBCUTANEOUS ONCE
Status: COMPLETED | OUTPATIENT
Start: 2022-03-18 | End: 2022-03-18

## 2022-03-18 RX ORDER — MORPHINE SULFATE 4 MG/ML
1 INJECTION INTRAVENOUS
Status: DISCONTINUED | OUTPATIENT
Start: 2022-03-18 | End: 2022-03-18

## 2022-03-18 RX ORDER — HYDROMORPHONE HYDROCHLORIDE 1 MG/ML
1 INJECTION, SOLUTION INTRAMUSCULAR; INTRAVENOUS; SUBCUTANEOUS ONCE
Status: DISCONTINUED | OUTPATIENT
Start: 2022-03-18 | End: 2022-03-18

## 2022-03-18 RX ORDER — HYDROMORPHONE HYDROCHLORIDE 1 MG/ML
1 INJECTION, SOLUTION INTRAMUSCULAR; INTRAVENOUS; SUBCUTANEOUS
Status: COMPLETED | OUTPATIENT
Start: 2022-03-18 | End: 2022-03-18

## 2022-03-18 RX ORDER — HYDROMORPHONE HYDROCHLORIDE 1 MG/ML
1 INJECTION, SOLUTION INTRAMUSCULAR; INTRAVENOUS; SUBCUTANEOUS
Status: DISCONTINUED | OUTPATIENT
Start: 2022-03-18 | End: 2022-03-22

## 2022-03-18 RX ORDER — OXYCODONE HYDROCHLORIDE 5 MG/1
2.5 TABLET ORAL
Status: DISCONTINUED | OUTPATIENT
Start: 2022-03-18 | End: 2022-03-18

## 2022-03-18 RX ORDER — IBUPROFEN 400 MG/1
800 TABLET ORAL 3 TIMES DAILY PRN
Status: DISCONTINUED | OUTPATIENT
Start: 2022-03-21 | End: 2022-03-21

## 2022-03-18 RX ADMIN — HYDROMORPHONE HYDROCHLORIDE 1 MG: 1 INJECTION, SOLUTION INTRAMUSCULAR; INTRAVENOUS; SUBCUTANEOUS at 00:49

## 2022-03-18 RX ADMIN — OXYCODONE HYDROCHLORIDE 5 MG: 5 TABLET ORAL at 03:15

## 2022-03-18 RX ADMIN — OXYCODONE HYDROCHLORIDE 10 MG: 10 TABLET ORAL at 14:17

## 2022-03-18 RX ADMIN — KETOROLAC TROMETHAMINE 30 MG: 30 INJECTION, SOLUTION INTRAMUSCULAR at 11:10

## 2022-03-18 RX ADMIN — HYDROMORPHONE HYDROCHLORIDE 1 MG: 1 INJECTION, SOLUTION INTRAMUSCULAR; INTRAVENOUS; SUBCUTANEOUS at 20:46

## 2022-03-18 RX ADMIN — KETOROLAC TROMETHAMINE 30 MG: 30 INJECTION, SOLUTION INTRAMUSCULAR at 16:50

## 2022-03-18 RX ADMIN — HYDROMORPHONE HYDROCHLORIDE 1 MG: 1 INJECTION, SOLUTION INTRAMUSCULAR; INTRAVENOUS; SUBCUTANEOUS at 20:47

## 2022-03-18 RX ADMIN — PIPERACILLIN AND TAZOBACTAM 4.5 G: 4; .5 INJECTION, POWDER, LYOPHILIZED, FOR SOLUTION INTRAVENOUS; PARENTERAL at 06:07

## 2022-03-18 RX ADMIN — INSULIN HUMAN 2 UNITS: 100 INJECTION, SOLUTION PARENTERAL at 06:12

## 2022-03-18 RX ADMIN — Medication 1 CAPSULE: at 11:10

## 2022-03-18 RX ADMIN — SODIUM CHLORIDE 3 G: 900 INJECTION INTRAVENOUS at 16:50

## 2022-03-18 RX ADMIN — OXYCODONE HYDROCHLORIDE 5 MG: 5 TABLET ORAL at 18:39

## 2022-03-18 RX ADMIN — SODIUM CHLORIDE 3 G: 900 INJECTION INTRAVENOUS at 23:46

## 2022-03-18 RX ADMIN — KETOROLAC TROMETHAMINE 30 MG: 30 INJECTION, SOLUTION INTRAMUSCULAR at 23:47

## 2022-03-18 RX ADMIN — HYDROMORPHONE HYDROCHLORIDE 2 MG: 2 INJECTION, SOLUTION INTRAMUSCULAR; INTRAVENOUS; SUBCUTANEOUS at 09:14

## 2022-03-18 RX ADMIN — INSULIN HUMAN 2 UNITS: 100 INJECTION, SOLUTION PARENTERAL at 20:54

## 2022-03-18 RX ADMIN — HYDROMORPHONE HYDROCHLORIDE 1 MG: 1 INJECTION, SOLUTION INTRAMUSCULAR; INTRAVENOUS; SUBCUTANEOUS at 15:25

## 2022-03-18 RX ADMIN — ONDANSETRON 4 MG: 2 INJECTION INTRAMUSCULAR; INTRAVENOUS at 22:47

## 2022-03-18 RX ADMIN — SODIUM CHLORIDE 3 G: 900 INJECTION INTRAVENOUS at 11:11

## 2022-03-18 RX ADMIN — OXYCODONE HYDROCHLORIDE 5 MG: 5 TABLET ORAL at 11:33

## 2022-03-18 RX ADMIN — OXYCODONE HYDROCHLORIDE 5 MG: 5 TABLET ORAL at 06:21

## 2022-03-18 RX ADMIN — INSULIN HUMAN 2 UNITS: 100 INJECTION, SOLUTION PARENTERAL at 11:19

## 2022-03-18 ASSESSMENT — PAIN DESCRIPTION - PAIN TYPE
TYPE: ACUTE PAIN
TYPE: ACUTE PAIN;SURGICAL PAIN
TYPE: ACUTE PAIN
TYPE: ACUTE PAIN
TYPE: ACUTE PAIN;SURGICAL PAIN
TYPE: ACUTE PAIN
TYPE: ACUTE PAIN;SURGICAL PAIN
TYPE: ACUTE PAIN

## 2022-03-18 ASSESSMENT — ENCOUNTER SYMPTOMS
DIZZINESS: 0
FOCAL WEAKNESS: 0
DOUBLE VISION: 0
VOMITING: 0
SHORTNESS OF BREATH: 0
FEVER: 0
DIARRHEA: 0
WHEEZING: 0
CHILLS: 0
NAUSEA: 0
SPUTUM PRODUCTION: 0
ABDOMINAL PAIN: 0
BLURRED VISION: 0
NERVOUS/ANXIOUS: 0
MYALGIAS: 0
SINUS PAIN: 0
HEADACHES: 0
SORE THROAT: 0
PALPITATIONS: 0
COUGH: 0
CONSTIPATION: 0

## 2022-03-18 NOTE — WOUND TEAM
Consult received for VAC placement to scrotal wound following I&D with Dr. Adams on 3/17. VAC supplies ordered.

## 2022-03-18 NOTE — PROGRESS NOTES
Dressings changed, refused iodoform dressings to be taken out or changed, reinforced with abd pad.

## 2022-03-18 NOTE — CARE PLAN
The patient is Stable - Low risk of patient condition declining or worsening    Shift Goals  Clinical Goals: Pt pain will decrease to 3/10 after pain medication  Patient Goals: for I and D today    Progress made toward(s) clinical / shift goals:  Pt given dilaudid with good relief after giving. Pain scale after 3/10. I and D done. Rest comfortably.    Patient is not progressing towards the following goals:      Problem: Pain - Standard  Goal: Alleviation of pain or a reduction in pain to the patient’s comfort goal  Outcome: Progressing     Problem: Pre Op  Goal: Optimal preparation for surgery  Outcome: Progressing     Problem: Wound/ / Incision Healing  Goal: Patient's wound/surgical incision will decrease in size and heals properly  Outcome: Progressing     Problem: Diabetes Management  Goal: Patient will achieve and maintain glucose in satisfactory range  Outcome: Progressing

## 2022-03-18 NOTE — PROGRESS NOTES
Received in bed, aox4, with wound dressing under the scrotum area D&I.. Assessment as per GSU. Call light within reach. Needs attended. Plan of care discussed and understood.

## 2022-03-18 NOTE — PROGRESS NOTES
Infectious Disease Progress Note    Author: SPENSER Daley M.D. Date & Time created: 3/18/2022  8:48 AM    Interval History:  Rx: Piperacillin-tazobactam    3/18: Malaise has evaporated since surgery. Had some bleeding in the toilet after surgery last night. Afebrile since admission. WBC 10,600. . Lactic acid 2.5 on admission and down to 1.9. Admission blood cultures negative. CT showed a 1.8 x 5.7 cm abscess in the right perineum tracking to the scrotum prior to surgery. Bilat inguinal adenopathy.  I&D of perineal abscess 3/17 with culture negative to date. Gram stain showed many WBC but no organisms.    Review of Systems:  Review of Systems   Constitutional: Negative for chills, fever and malaise/fatigue.   HENT: Negative for sore throat.    Respiratory: Negative for cough and shortness of breath.    Cardiovascular: Negative for chest pain.   Gastrointestinal: Negative for abdominal pain, diarrhea, nausea and vomiting.   Genitourinary: Negative for dysuria.        Perineal pain.    Musculoskeletal: Negative for joint pain and myalgias.   Skin: Negative for rash.   Neurological: Negative for dizziness and headaches.     Physical Exam:  Physical Exam  Constitutional:       General: He is not in acute distress.     Appearance: He is obese.   HENT:      Head: Normocephalic.      Mouth/Throat:      Mouth: Mucous membranes are moist.      Pharynx: No oropharyngeal exudate.   Eyes:      Conjunctiva/sclera: Conjunctivae normal.   Cardiovascular:      Rate and Rhythm: Normal rate and regular rhythm.   Pulmonary:      Effort: Pulmonary effort is normal.      Breath sounds: Normal breath sounds.   Abdominal:      General: Bowel sounds are normal. There is no distension.      Palpations: Abdomen is soft.      Tenderness: There is no abdominal tenderness.   Genitourinary:     Comments: 1-2 cm right inguinal lymph node not really tender.  Musculoskeletal:      Right lower leg: No edema.      Left lower leg: No edema.    Skin:     General: Skin is warm and dry.      Findings: No rash.   Neurological:      General: No focal deficit present.      Mental Status: He is oriented to person, place, and time.   Psychiatric:         Mood and Affect: Mood normal.       Labs:  Recent Results (from the past 24 hour(s))   Lactic Acid Every four hours after STAT order    Collection Time: 03/17/22  8:59 AM   Result Value Ref Range    Lactic Acid 1.9 0.5 - 2.0 mmol/L   POCT glucose device results    Collection Time: 03/17/22 11:03 AM   Result Value Ref Range    POC Glucose, Blood 167 (H) 65 - 99 mg/dL   POCT SARS-COV Antigen CAROLYN manual result ([facility] Only)    Collection Time: 03/17/22  2:15 PM   Result Value Ref Range    Internal  Pass     SARS-COV ANTIGEN CAROLYN Negative Negative, Indeterminate, None Detected, Valid, Invalid, Pass   POCT glucose device results    Collection Time: 03/17/22  2:21 PM   Result Value Ref Range    POC Glucose, Blood 114 (H) 65 - 99 mg/dL   POCT glucose device results    Collection Time: 03/17/22  2:42 PM   Result Value Ref Range    POC Glucose, Blood 134 (H) 65 - 99 mg/dL   Anaerobic Culture    Collection Time: 03/17/22  4:09 PM    Specimen: Wound   Result Value Ref Range    Significant Indicator NEG     Source WND     Site Scrotal Fluid     Culture Result Culture in progress.    CULTURE WOUND W/ GRAM STAIN    Collection Time: 03/17/22  4:09 PM    Specimen: Wound   Result Value Ref Range    Significant Indicator NEG     Source WND     Site Scrotal Fluid     Culture Result -     Gram Stain Result Many WBCs.  No organisms seen.      GRAM STAIN    Collection Time: 03/17/22  4:09 PM    Specimen: Wound   Result Value Ref Range    Significant Indicator .     Source WND     Site Scrotal Fluid     Gram Stain Result Many WBCs.  No organisms seen.      POCT glucose device results    Collection Time: 03/17/22  5:19 PM   Result Value Ref Range    POC Glucose, Blood 174 (H) 65 - 99 mg/dL   POCT glucose device results     Collection Time: 03/17/22  8:40 PM   Result Value Ref Range    POC Glucose, Blood 250 (H) 65 - 99 mg/dL   CBC WITH DIFFERENTIAL    Collection Time: 03/18/22  3:09 AM   Result Value Ref Range    WBC 10.6 4.8 - 10.8 K/uL    RBC 5.45 4.70 - 6.10 M/uL    Hemoglobin 15.2 14.0 - 18.0 g/dL    Hematocrit 45.0 42.0 - 52.0 %    MCV 82.6 81.4 - 97.8 fL    MCH 27.9 27.0 - 33.0 pg    MCHC 33.8 33.7 - 35.3 g/dL    RDW 36.2 35.9 - 50.0 fL    Platelet Count 360 164 - 446 K/uL    MPV 8.7 (L) 9.0 - 12.9 fL    Neutrophils-Polys 81.10 (H) 44.00 - 72.00 %    Lymphocytes 12.70 (L) 22.00 - 41.00 %    Monocytes 3.10 0.00 - 13.40 %    Eosinophils 0.20 0.00 - 6.90 %    Basophils 0.50 0.00 - 1.80 %    Immature Granulocytes 2.40 (H) 0.00 - 0.90 %    Nucleated RBC 0.00 /100 WBC    Neutrophils (Absolute) 8.63 (H) 1.82 - 7.42 K/uL    Lymphs (Absolute) 1.35 1.00 - 4.80 K/uL    Monos (Absolute) 0.33 0.00 - 0.85 K/uL    Eos (Absolute) 0.02 0.00 - 0.51 K/uL    Baso (Absolute) 0.05 0.00 - 0.12 K/uL    Immature Granulocytes (abs) 0.26 (H) 0.00 - 0.11 K/uL    NRBC (Absolute) 0.00 K/uL   Comp Metabolic Panel    Collection Time: 03/18/22  3:09 AM   Result Value Ref Range    Sodium 134 (L) 135 - 145 mmol/L    Potassium 4.1 3.6 - 5.5 mmol/L    Chloride 100 96 - 112 mmol/L    Co2 21 20 - 33 mmol/L    Anion Gap 13.0 7.0 - 16.0    Glucose 195 (H) 65 - 99 mg/dL    Bun 10 8 - 22 mg/dL    Creatinine 0.58 0.50 - 1.40 mg/dL    Calcium 9.4 8.4 - 10.2 mg/dL    AST(SGOT) 67 (H) 12 - 45 U/L    ALT(SGPT) 135 (H) 2 - 50 U/L    Alkaline Phosphatase 112 (H) 30 - 99 U/L    Total Bilirubin 0.5 0.1 - 1.5 mg/dL    Albumin 4.3 3.2 - 4.9 g/dL    Total Protein 7.6 6.0 - 8.2 g/dL    Globulin 3.3 1.9 - 3.5 g/dL    A-G Ratio 1.3 g/dL   ESTIMATED GFR    Collection Time: 03/18/22  3:09 AM   Result Value Ref Range    GFR (CKD-EPI) 128 >60 mL/min/1.73 m 2   POCT glucose device results    Collection Time: 03/18/22  6:10 AM   Result Value Ref Range    POC Glucose, Blood 208 (H) 65 - 99  mg/dL     Results     Procedure Component Value Units Date/Time    GRAM STAIN [[id number]] Collected: 22 1609    Order Status: Completed Specimen: Wound Updated: 22 0824     Significant Indicator .     Source WND     Site Scrotal Fluid     Gram Stain Result Many WBCs.  No organisms seen.      Anaerobic Culture [[id number]] Collected: 22 1609    Order Status: Completed Specimen: Wound Updated: 22     Significant Indicator NEG     Source WND     Site Scrotal Fluid     Culture Result Culture in progress.    CULTURE WOUND W/ GRAM STAIN [[id number]] Collected: 22 160    Order Status: No result Specimen: Wound Updated: 22     Significant Indicator NEG     Source WND     Site Scrotal Fluid     Culture Result -     Gram Stain Result Many WBCs.  No organisms seen.      BLOOD CULTURE [[id number]] Collected: 22 1508    Order Status: Completed Specimen: Blood from Peripheral Updated: 22 0731     Significant Indicator NEG     Source BLD     Site PERIPHERAL     Culture Result No Growth    BLOOD CULTURE [[id number]] Collected: 22 1556    Order Status: Completed Specimen: Blood from Peripheral Updated: 22 0731     Significant Indicator NEG     Source BLD     Site PERIPHERAL     Culture Result No Growth      URINALYSIS [[id number]]  (Abnormal) Collected: 22 1543    Order Status: Completed Specimen: Urine, Clean Catch Updated: 22     Color Yellow     Character Clear     Specific Gravity >=1.030     Ph 5.0     Glucose 100 mg/dL      Ketones Negative mg/dL      Protein Negative mg/dL      Bilirubin Negative     Nitrite Negative     Leukocyte Esterase Negative     Occult Blood Negative     Micro Urine Req see below    URINE CULTURE(NEW) [[id number]] Collected: 22 1543    Order Status: Sent Specimen: Urine, Clean Catch Updated: 22 154     Hemodynamics:  Temp (24hrs), Av.7 °C (98 °F), Min:36.3 °C (97.3 °F), Max:36.9 °C (98.4 °F)  Temperature:  36.9 °C (98.4 °F)  Pulse  Av.4  Min: 60  Max: 101   Blood Pressure: 159/73, NIBP: 143/77     Peripheral IV 22 20 G Left Forearm (Active)   Site Assessment Clean;Dry;Intact 22 2200      Fluids:  Intake/Output                             22 0700 - 22 0659 22 0700 - 22 0659 22 0700 - 22 0659     4425-5536 8326-8846 Total 2339-1564 2570-3721 Total 9798-0807 8102-1380 Total                    Intake    P.O.  --  1000 1000  500  -- 500  120  -- 120    I.V.  --  262.8 262.8  700  -- 700  --  -- --    IV Piggyback  --  200 200  --  -- --  --  -- --    Total Intake -- 1462.8 1462.8 1200 -- 1200 120 -- 120        Medications:  Current Facility-Administered Medications   Medication Last Admin   • senna-docusate (PERICOLACE or SENOKOT S) 8.6-50 MG per tablet 2 Tablet     • insulin regular (HumuLIN R,NovoLIN R) injection 2 Units at 22 0612   • piperacillin-tazobactam (ZOSYN) 4.5 g in  mL IVPB 4.5 g at 22 0607   • hydrALAZINE (APRESOLINE) injection 20 mg     • labetalol (NORMODYNE/TRANDATE) injection 10 mg     • metoclopramide (REGLAN) injection 10 mg       Medical Decision Making, by Problem:  Active Hospital Problems    Diagnosis    • *Perineal abscess [L02.215] Group B Streptococcus    • DM (diabetes mellitus) (HCC) [E11.9]    • Dehydration [E86.0]    • Elevated blood pressure reading [R03.0]    • Lactic acidosis [E87.2]      Plan:  1.  Substitute ampicillin-sulbactam for piperacillin-tazobactam.  2.  Monitor wound.    25 min, >50% in direct care and coordination. D/w patient, wife and Dr. Cardona.

## 2022-03-18 NOTE — PROGRESS NOTES
1700-Received report from PACU RN.   1730-  Pt arrived to floor via hospital bed..  Assumed care of pt.   Assessment and chart check complete.   Pt is AAOX4, no signs of distress, denies nausea/vomiting.   Dressing CDI   Reports pain, medicated per MAR.  Blood glucose 174 mg/dl.  Diabetic diet instructed.   Fall precautions in place, treaded socks on pt, bed in low position.   Call light within reach.   Educated pt to call if needing anything.   Hourly rounding.

## 2022-03-18 NOTE — DISCHARGE PLANNING
Care Transition Team Assessment    LSW completed assessment via chart review.  Patient independent prior to admission. Patient lives with spouse. Patient has a CPAP at home.     Patient will likely need wound care and IV abx. Wound and abx recommendations not finalized. Case management to follow for recommendations.    Information Source  Information Given By: Other (Comments)  Informant's Name: chart review  Who is responsible for making decisions for patient? : Patient    Readmission Evaluation  Is this a readmission?: No    Elopement Risk  Legal Hold: No  Ambulatory or Self Mobile in Wheelchair: Yes  Disoriented: No  Psychiatric Symptoms: None  History of Wandering: No  Elopement this Admit: No  Vocalizing Wanting to Leave: No  Displays Behaviors, Body Language Wanting to Leave: No-Not at Risk for Elopement  Elopement Risk: Not at Risk for Elopement    Interdisciplinary Discharge Planning  Lives with - Patient's Self Care Capacity: Spouse,Child Less than 18 Years of Age  Patient or legal guardian wants to designate a caregiver: Yes  Caregiver name: Ashley Jacinto  Caregiver contact info:   Support Systems: Friends / Neighbors  Housing / Facility: 1 Story Apartment / Condo  Patient Prefers to be Discharged to:: home  Durable Medical Equipment: Other - Specify (glucometer, cpap)    Discharge Preparedness  What is your plan after discharge?: Home with help  What are your discharge supports?: Spouse  Prior Functional Level: Ambulatory,Independent with Activities of Daily Living,Independent with Medication Management  Difficulity with ADLs: None  Difficulity with IADLs: None    Functional Assesment  Prior Functional Level: Ambulatory,Independent with Activities of Daily Living,Independent with Medication Management    Finances  Financial Barriers to Discharge: No  Prescription Coverage: Yes    Vision / Hearing Impairment  Vision Impairment : Yes  Right Eye Vision: Wears Glasses  Left Eye Vision: Wears  Glasses  Hearing Impairment : No         Advance Directive  Advance Directive?: None    Domestic Abuse  Have you ever been the victim of abuse or violence?: No  Physical Abuse or Sexual Abuse: No  Verbal Abuse or Emotional Abuse: No  Possible Abuse/Neglect Reported to:: Not Applicable    Psychological Assessment  History of Substance Abuse: None  History of Psychiatric Problems: No  Non-compliant with Treatment: No  Newly Diagnosed Illness: No    Discharge Risks or Barriers  Discharge risks or barriers?: No    Anticipated Discharge Information  Discharge Disposition: Discharged to home/self care (01)  Discharge Address: [address] [place] [address] [address]  [place] [id number]

## 2022-03-18 NOTE — PROGRESS NOTES
Hospital Medicine Daily Progress Note    Date of Service  3/18/2022    Chief Complaint  Connor Jacinto is a 36 y.o. male admitted 3/16/2022 with groin infection since approx 3/4/22    Hospital Course  A 36 y.o. male with a past medical history of diabetes, obesity, recent perineal infection who follows with [facility] infectious disease, Dr. Analisa Hidalgo , who presented 3/16/2022 with worsening perineal pain and swelling.  Patient has been having perineal pain swelling and chills over the past week.  He was hospitalized at [facility] and received antibiotics intravenously and was discharged ( AMA ) on orals however his symptoms continue to worsen.  He was seen by his infectious disease doctor on 3/16 and was told to go directly to the hospital for intravenous antibiotics.     Interval Problem Update  Patient was seen and examined at bedside.  Patient's wife at bedside.  C/o uncontrolled pain from surgical site . Adjusted pain meds.   No dysuria and voiding okay.  Pain control -Multimodal pain control.  IV Zosyn switched to IV Unasyn for ID on 3/18  status post incision and drainage right scrotal abscess by urology on 3/17  Wound care    I have personally seen and examined the patient at bedside. I discussed the plan of care with patient, family, bedside RN, charge RN, , pharmacy and infectious disease.    Consultants/Specialty  general surgery and infectious disease    Code Status  Full Code    Disposition  Patient is not medically cleared for discharge.   Anticipate discharge to to home with close outpatient follow-up.  I have placed the appropriate orders for post-discharge needs.    Review of Systems  Review of Systems   Constitutional: Positive for malaise/fatigue. Negative for chills and fever.   HENT: Negative for congestion, ear discharge, ear pain, sinus pain and sore throat.    Eyes: Negative for blurred vision and double vision.   Respiratory: Negative for cough, sputum production, shortness of  breath and wheezing.    Cardiovascular: Negative for chest pain, palpitations and leg swelling.   Gastrointestinal: Negative for abdominal pain, constipation, diarrhea, nausea and vomiting.   Genitourinary: Negative for dysuria, frequency and urgency.        Scrotal Wound - packing in placed. S/p I & D   Musculoskeletal: Negative for myalgias.   Neurological: Negative for dizziness, focal weakness and headaches.   Psychiatric/Behavioral: The patient is not nervous/anxious.         Physical Exam  Temp:  [36.3 °C (97.3 °F)-36.9 °C (98.4 °F)] 36.6 °C (97.8 °F)  Pulse:  [54-79] 54  Resp:  [12-20] 20  BP: (118-159)/(60-84) 119/60  SpO2:  [92 %-99 %] 95 %    Physical Exam  Constitutional:       General: He is not in acute distress.     Appearance: He is obese.   HENT:      Head: Normocephalic and atraumatic.      Nose: Nose normal.      Mouth/Throat:      Mouth: Mucous membranes are moist.      Pharynx: No posterior oropharyngeal erythema.   Eyes:      General: No scleral icterus.     Extraocular Movements: Extraocular movements intact.      Conjunctiva/sclera: Conjunctivae normal.      Pupils: Pupils are equal, round, and reactive to light.   Cardiovascular:      Rate and Rhythm: Normal rate and regular rhythm.      Pulses: Normal pulses.      Heart sounds: Normal heart sounds. No murmur heard.    No gallop.   Pulmonary:      Effort: Pulmonary effort is normal.      Breath sounds: Normal breath sounds. No stridor. No wheezing, rhonchi or rales.   Abdominal:      General: Bowel sounds are normal.      Palpations: Abdomen is soft.   Genitourinary:     Comments: Scrotal Wound - packing in placed. S/p I & D  Musculoskeletal:         General: No swelling or tenderness.      Cervical back: Normal range of motion and neck supple. No rigidity.   Skin:     General: Skin is warm.   Neurological:      General: No focal deficit present.      Mental Status: He is alert and oriented to person, place, and time.   Psychiatric:          Mood and Affect: Mood normal.         Behavior: Behavior normal.         Fluids    Intake/Output Summary (Last 24 hours) at 3/18/2022 0955  Last data filed at 3/18/2022 0839  Gross per 24 hour   Intake 1320 ml   Output 50 ml   Net 1270 ml       Laboratory  Recent Labs     03/16/22  1508 03/17/22  0430 03/18/22  0309   WBC 9.1 7.5 10.6   RBC 5.27 4.88 5.45   HEMOGLOBIN 15.0 13.6* 15.2   HEMATOCRIT 44.9 42.5 45.0   MCV 85.2 87.1 82.6   MCH 28.5 27.9 27.9   MCHC 33.4* 32.0* 33.8   RDW 38.3 40.0 36.2   PLATELETCT 302 246 360   MPV 8.1* 8.7* 8.7*     Recent Labs     03/16/22  1508 03/17/22  0430 03/18/22  0309   SODIUM 138 134* 134*   POTASSIUM 3.9 3.7 4.1   CHLORIDE 101 100 100   CO2 22 18* 21   GLUCOSE 212* 152* 195*   BUN 10 9 10   CREATININE 0.75 0.71 0.58   CALCIUM 9.5 9.1 9.4                   Imaging  [id number]   Final Result      1.  No evidence of testicular mass or torsion.      2.  Complex fluid collection involving the low right perineum extending into the inferior scrotal sac measuring 5.6 x 2.9 x 2.5 cm in size possibly representing abscess.      CT-ABDOMEN-PELVIS W/O   Final Result      1.  Inflammatory changes at the right perineum tracking to the right scrotum with 5.7 x 1.8 cm fluid collection, consistent with cellulitis in the correct clinical setting. There is no soft tissue gas to confirm Ivteh's gangrene, however Iveth's    gangrene cannot be excluded.   2.  Likely reactive bilateral inguinal lymphadenopathy.      DX-CHEST-PORTABLE (1 VIEW)   Final Result      1.  There is no acute cardiopulmonary process.           Assessment/Plan  * Perineal abscess- (present on admission)  Assessment & Plan  No crepitus on examination to suggest Iveth's gangrene  Sent by [facility], Dr Analisa Hidalgo for intravenous antibiotics.  Reviewed CT A/P and a scrotal ultrasound.  IV Zosyn switched to IV Unasyn for ID on 3/18  status post incision and drainage right scrotal abscess by  urology on 3/17  Wound care      Lactic acidosis- (present on admission)  Assessment & Plan  Likely secondary to reduced intravascular volume secondary to dehydration.  Resolved with IVF     Elevated blood pressure reading- (present on admission)  Assessment & Plan  Likely secondary to severe pain.  Multimodal pain control  Consider starting scheduled medications according to trend    Dehydration- (present on admission)  Assessment & Plan  Likely secondary to reduced oral intake and increased insensible losses.  Encourage oral intake as tolerated, antiemetics as needed.  IVF    DM (diabetes mellitus) (HCC)- (present on admission)  Assessment & Plan  With hyperglycemia  Last glycated hemoglobin was 11.2 %  ISS  Accu-Checks, hypoglycemia protocol         VTE prophylaxis: SCDs/TEDs and pharmacologic prophylaxis contraindicated due to Recent Surgery     I have performed a physical exam and reviewed and updated ROS and Plan today (3/18/2022). In review of yesterday's note (3/17/2022), there are no changes except as documented above.

## 2022-03-18 NOTE — CARE PLAN
The patient is stable.    Shift Goals  Clinical Goals: report less pain  Patient Goals: relief from pain    Progress made toward(s) clinical / shift goals:  expressing relief from pain      Problem: Pain - Standard  Goal: Alleviation of pain or a reduction in pain to the patient’s comfort goal  Outcome: Progressing     Problem: Knowledge Deficit - Standard  Goal: Patient and family/care givers will demonstrate understanding of plan of care, disease process/condition, diagnostic tests and medications  Outcome: Progressing     Problem: Wound/ / Incision Healing  Goal: Patient's wound/surgical incision will decrease in size and heals properly  Outcome: Progressing     Problem: Diabetes Management  Goal: Patient will achieve and maintain glucose in satisfactory range  Outcome: Progressing

## 2022-03-18 NOTE — PROGRESS NOTES
4 Eyes Skin Assessment Completed by Roseanne RN and Trace RN.     Head WDL  Ears WDL  Nose WDL  Mouth WDL  Neck Redness (baseline per pt)  Breast/Chest Redness (baseline per pt)  Shoulder Blades Redness (baseline per pt)  Spine WDL  (R) Arm/Elbow/Hand Redness (baseline per pt)  (L) Arm/Elbow/Hand  Redness (baseline per pt)  Abdomen Redness (baseline per pt)  Groin WDL  Scrotum/Coccyx/Buttocks perineal dressing in place,redness  (R) Leg WDL  (L) Leg WDL  (R) Heel/Foot/Toe WDL  (L) Heel/Foot/Toe WDL              Devices In Places home cpap at night        Interventions In Place N/A     Possible Skin Injury No     Pictures Uploaded Into Epic N/A  Wound Consult Placed N/A  RN Wound Prevention Protocol Ordered No

## 2022-03-18 NOTE — PROGRESS NOTES
Report received from Day RN, assumed care of pt.   POC and medications reviewed with pt. Pt verbalized understanding.   AOx4  C/o 6/10 at this time, no meds ordered for parameter of pain, MD Hurley notified  Denies  SOB, or dizziness at this time.   Safety measures in place.  Dressing CDI, abd pad exchanged    1948: New orders received from Mei MOYA. Meds administered per MAR.

## 2022-03-18 NOTE — CARE PLAN
The patient is Stable - Low risk of patient condition declining or worsening    Shift Goals  Clinical Goals: pt will report pain of less than 3/10 after med administration throughout the shift  Patient Goals: sleep comfortably throughout the night    Progress made toward(s) clinical / shift goals:    Pt maintained 3/10 pain after med admin through the night   Problem: Pain - Standard  Goal: Alleviation of pain or a reduction in pain to the patient’s comfort goal  Outcome: Progressing     Problem: Knowledge Deficit - Standard  Goal: Patient and family/care givers will demonstrate understanding of plan of care, disease process/condition, diagnostic tests and medications  Outcome: Progressing     Problem: Wound/ / Incision Healing  Goal: Patient's wound/surgical incision will decrease in size and heals properly  Outcome: Progressing     Problem: Diabetes Management  Goal: Patient will achieve and maintain glucose in satisfactory range  Outcome: Progressing       Patient is not progressing towards the following goals:

## 2022-03-19 LAB
ALBUMIN SERPL BCP-MCNC: 3.9 G/DL (ref 3.2–4.9)
ALBUMIN/GLOB SERPL: 1.3 G/DL
ALP SERPL-CCNC: 94 U/L (ref 30–99)
ALT SERPL-CCNC: 106 U/L (ref 2–50)
ANION GAP SERPL CALC-SCNC: 11 MMOL/L (ref 7–16)
AST SERPL-CCNC: 53 U/L (ref 12–45)
BACTERIA UR CULT: NORMAL
BASOPHILS # BLD AUTO: 1.2 % (ref 0–1.8)
BASOPHILS # BLD: 0.1 K/UL (ref 0–0.12)
BILIRUB SERPL-MCNC: 0.3 MG/DL (ref 0.1–1.5)
BUN SERPL-MCNC: 13 MG/DL (ref 8–22)
CALCIUM SERPL-MCNC: 9 MG/DL (ref 8.4–10.2)
CHLORIDE SERPL-SCNC: 101 MMOL/L (ref 96–112)
CO2 SERPL-SCNC: 25 MMOL/L (ref 20–33)
CREAT SERPL-MCNC: 0.72 MG/DL (ref 0.5–1.4)
EOSINOPHIL # BLD AUTO: 0.19 K/UL (ref 0–0.51)
EOSINOPHIL NFR BLD: 2.3 % (ref 0–6.9)
ERYTHROCYTE [DISTWIDTH] IN BLOOD BY AUTOMATED COUNT: 38.4 FL (ref 35.9–50)
GFR SERPLBLD CREATININE-BSD FMLA CKD-EPI: 120 ML/MIN/1.73 M 2
GLOBULIN SER CALC-MCNC: 3.1 G/DL (ref 1.9–3.5)
GLUCOSE BLD STRIP.AUTO-MCNC: 135 MG/DL (ref 65–99)
GLUCOSE BLD STRIP.AUTO-MCNC: 188 MG/DL (ref 65–99)
GLUCOSE BLD STRIP.AUTO-MCNC: 210 MG/DL (ref 65–99)
GLUCOSE BLD STRIP.AUTO-MCNC: 214 MG/DL (ref 65–99)
GLUCOSE SERPL-MCNC: 149 MG/DL (ref 65–99)
HCT VFR BLD AUTO: 43.6 % (ref 42–52)
HGB BLD-MCNC: 14.3 G/DL (ref 14–18)
IMM GRANULOCYTES # BLD AUTO: 0.27 K/UL (ref 0–0.11)
IMM GRANULOCYTES NFR BLD AUTO: 3.3 % (ref 0–0.9)
LYMPHOCYTES # BLD AUTO: 2.8 K/UL (ref 1–4.8)
LYMPHOCYTES NFR BLD: 34.2 % (ref 22–41)
MCH RBC QN AUTO: 28.2 PG (ref 27–33)
MCHC RBC AUTO-ENTMCNC: 32.8 G/DL (ref 33.7–35.3)
MCV RBC AUTO: 86 FL (ref 81.4–97.8)
MONOCYTES # BLD AUTO: 0.53 K/UL (ref 0–0.85)
MONOCYTES NFR BLD AUTO: 6.5 % (ref 0–13.4)
NEUTROPHILS # BLD AUTO: 4.29 K/UL (ref 1.82–7.42)
NEUTROPHILS NFR BLD: 52.5 % (ref 44–72)
NRBC # BLD AUTO: 0 K/UL
NRBC BLD-RTO: 0 /100 WBC
PLATELET # BLD AUTO: 291 K/UL (ref 164–446)
PMV BLD AUTO: 8.6 FL (ref 9–12.9)
POTASSIUM SERPL-SCNC: 3.8 MMOL/L (ref 3.6–5.5)
PROT SERPL-MCNC: 7 G/DL (ref 6–8.2)
RBC # BLD AUTO: 5.07 M/UL (ref 4.7–6.1)
SIGNIFICANT IND 70042: NORMAL
SITE SITE: NORMAL
SODIUM SERPL-SCNC: 137 MMOL/L (ref 135–145)
SOURCE SOURCE: NORMAL
WBC # BLD AUTO: 8.2 K/UL (ref 4.8–10.8)

## 2022-03-19 PROCEDURE — 700102 HCHG RX REV CODE 250 W/ 637 OVERRIDE(OP): Performed by: HOSPITALIST

## 2022-03-19 PROCEDURE — 94760 N-INVAS EAR/PLS OXIMETRY 1: CPT

## 2022-03-19 PROCEDURE — 80053 COMPREHEN METABOLIC PANEL: CPT

## 2022-03-19 PROCEDURE — 99233 SBSQ HOSP IP/OBS HIGH 50: CPT | Performed by: STUDENT IN AN ORGANIZED HEALTH CARE EDUCATION/TRAINING PROGRAM

## 2022-03-19 PROCEDURE — A9270 NON-COVERED ITEM OR SERVICE: HCPCS | Performed by: STUDENT IN AN ORGANIZED HEALTH CARE EDUCATION/TRAINING PROGRAM

## 2022-03-19 PROCEDURE — 97602 WOUND(S) CARE NON-SELECTIVE: CPT

## 2022-03-19 PROCEDURE — 85025 COMPLETE CBC W/AUTO DIFF WBC: CPT

## 2022-03-19 PROCEDURE — 700102 HCHG RX REV CODE 250 W/ 637 OVERRIDE(OP): Performed by: STUDENT IN AN ORGANIZED HEALTH CARE EDUCATION/TRAINING PROGRAM

## 2022-03-19 PROCEDURE — 36415 COLL VENOUS BLD VENIPUNCTURE: CPT

## 2022-03-19 PROCEDURE — 82962 GLUCOSE BLOOD TEST: CPT | Mod: 91

## 2022-03-19 PROCEDURE — 700111 HCHG RX REV CODE 636 W/ 250 OVERRIDE (IP): Performed by: INTERNAL MEDICINE

## 2022-03-19 PROCEDURE — 700111 HCHG RX REV CODE 636 W/ 250 OVERRIDE (IP): Performed by: STUDENT IN AN ORGANIZED HEALTH CARE EDUCATION/TRAINING PROGRAM

## 2022-03-19 PROCEDURE — 770006 HCHG ROOM/CARE - MED/SURG/GYN SEMI*

## 2022-03-19 PROCEDURE — 94660 CPAP INITIATION&MGMT: CPT

## 2022-03-19 PROCEDURE — 700105 HCHG RX REV CODE 258: Performed by: INTERNAL MEDICINE

## 2022-03-19 PROCEDURE — A9270 NON-COVERED ITEM OR SERVICE: HCPCS | Performed by: HOSPITALIST

## 2022-03-19 RX ORDER — LIDOCAINE HYDROCHLORIDE 20 MG/ML
20 INJECTION, SOLUTION INFILTRATION; PERINEURAL
Status: DISCONTINUED | OUTPATIENT
Start: 2022-03-19 | End: 2022-03-22 | Stop reason: HOSPADM

## 2022-03-19 RX ORDER — HYDROMORPHONE HYDROCHLORIDE 2 MG/ML
2 INJECTION, SOLUTION INTRAMUSCULAR; INTRAVENOUS; SUBCUTANEOUS ONCE
Status: DISCONTINUED | OUTPATIENT
Start: 2022-03-19 | End: 2022-03-19

## 2022-03-19 RX ORDER — LIDOCAINE HYDROCHLORIDE 40 MG/ML
SOLUTION TOPICAL
Status: DISCONTINUED | OUTPATIENT
Start: 2022-03-19 | End: 2022-03-22 | Stop reason: HOSPADM

## 2022-03-19 RX ADMIN — INSULIN HUMAN 1 UNITS: 100 INJECTION, SOLUTION PARENTERAL at 10:56

## 2022-03-19 RX ADMIN — SODIUM CHLORIDE 3 G: 900 INJECTION INTRAVENOUS at 11:04

## 2022-03-19 RX ADMIN — KETOROLAC TROMETHAMINE 30 MG: 30 INJECTION, SOLUTION INTRAMUSCULAR at 11:04

## 2022-03-19 RX ADMIN — OXYCODONE HYDROCHLORIDE 10 MG: 10 TABLET ORAL at 15:27

## 2022-03-19 RX ADMIN — HYDROMORPHONE HYDROCHLORIDE 1 MG: 1 INJECTION, SOLUTION INTRAMUSCULAR; INTRAVENOUS; SUBCUTANEOUS at 13:11

## 2022-03-19 RX ADMIN — INSULIN HUMAN 2 UNITS: 100 INJECTION, SOLUTION PARENTERAL at 20:59

## 2022-03-19 RX ADMIN — INSULIN HUMAN 2 UNITS: 100 INJECTION, SOLUTION PARENTERAL at 17:19

## 2022-03-19 RX ADMIN — OXYCODONE HYDROCHLORIDE 10 MG: 10 TABLET ORAL at 11:13

## 2022-03-19 RX ADMIN — KETOROLAC TROMETHAMINE 30 MG: 30 INJECTION, SOLUTION INTRAMUSCULAR at 06:14

## 2022-03-19 RX ADMIN — Medication 1 CAPSULE: at 08:01

## 2022-03-19 RX ADMIN — OXYCODONE HYDROCHLORIDE 10 MG: 10 TABLET ORAL at 06:14

## 2022-03-19 RX ADMIN — HYDROMORPHONE HYDROCHLORIDE 1 MG: 1 INJECTION, SOLUTION INTRAMUSCULAR; INTRAVENOUS; SUBCUTANEOUS at 21:00

## 2022-03-19 RX ADMIN — SODIUM CHLORIDE 3 G: 900 INJECTION INTRAVENOUS at 17:26

## 2022-03-19 RX ADMIN — KETOROLAC TROMETHAMINE 30 MG: 30 INJECTION, SOLUTION INTRAMUSCULAR at 17:22

## 2022-03-19 RX ADMIN — SODIUM CHLORIDE 3 G: 900 INJECTION INTRAVENOUS at 06:14

## 2022-03-19 RX ADMIN — OXYCODONE HYDROCHLORIDE 10 MG: 10 TABLET ORAL at 19:30

## 2022-03-19 RX ADMIN — HYDROMORPHONE HYDROCHLORIDE 1 MG: 1 INJECTION, SOLUTION INTRAMUSCULAR; INTRAVENOUS; SUBCUTANEOUS at 16:37

## 2022-03-19 ASSESSMENT — ENCOUNTER SYMPTOMS
SPUTUM PRODUCTION: 0
FEVER: 0
NERVOUS/ANXIOUS: 0
CHILLS: 0
PALPITATIONS: 0
SINUS PAIN: 0
COUGH: 0
HEADACHES: 0
CONSTIPATION: 0
FOCAL WEAKNESS: 0
VOMITING: 0
MYALGIAS: 0
SORE THROAT: 0
SHORTNESS OF BREATH: 0
DIARRHEA: 0
DOUBLE VISION: 0
NAUSEA: 0
BLURRED VISION: 0
ABDOMINAL PAIN: 0
DIZZINESS: 0
WHEEZING: 0

## 2022-03-19 ASSESSMENT — PAIN DESCRIPTION - PAIN TYPE
TYPE: ACUTE PAIN

## 2022-03-19 NOTE — PROGRESS NOTES
".  [facility] [place] Progress Note    Service: [facility] [place]  Patient's Name: Connor Jacinto  MRN: [id number]  Admit Date:3/16/2022  Today's Date: 3/18/2022   Room #: 2209/02      Identification:  36 y.o. male s/p I&D of RIGHT perineal/scrotal abscess with Dr. Adams 3/17/22 with 5cm abscess       Subjective/ROS:   Patient reports his fatigue has resolved. He is having tolerable pain in the perineal region, he has a burning sensation at the site when it is irritated. He is receiving Zosyn at this time. His scrotal fluid culture negative thus far with gram stain showing many WBCs.     His wound is currently being managed with iodoform gauze. Wound care is considering a vac potentially. He denies fever, chills, nausea, vomiting.     Physical Exam:  Current Vitals:   /65   Pulse 67   Temp 36.7 °C (98 °F) (Oral)   Resp 20   Ht 1.753 m (5' 9\")   Wt (!) 158 kg (348 lb 5.2 oz)   SpO2 100%   BMI 51.44 kg/m²     03/16 1900 - 03/18 0659  In: 2662.8 [P.O.:1500; I.V.:962.8]  Out: 50     GEN : NAD, A&O X4   RES:  no acute respiratory distress  :   Right perineal incision with iodoform packing, no erythema or purulent discharge noted around wound. No signs of scrotal cellulitis, no fluctuance, no crepitus of scrotum.     Labs:   Recent Labs     03/16/22  1508 03/17/22  0430 03/18/22  0309   SODIUM 138 134* 134*   POTASSIUM 3.9 3.7 4.1   CHLORIDE 101 100 100   CO2 22 18* 21   GLUCOSE 212* 152* 195*   BUN 10 9 10   CREATININE 0.75 0.71 0.58   CALCIUM 9.5 9.1 9.4     Recent Labs     03/16/22  1508 03/17/22  0430 03/18/22  0309   WBC 9.1 7.5 10.6   RBC 5.27 4.88 5.45   HEMOGLOBIN 15.0 13.6* 15.2   HEMATOCRIT 44.9 42.5 45.0   MCV 85.2 87.1 82.6   MCH 28.5 27.9 27.9   MCHC 33.4* 32.0* 33.8   RDW 38.3 40.0 36.2   PLATELETCT 302 246 360   MPV 8.1* 8.7* 8.7*     Lab Results   Component Value Date/Time    GLUCOSE 195 (H) 03/18/2022 03:09 AM    GLUCOSE 152 (H) 03/17/2022 04:30 AM    GLUCOSE 212 (H) 03/16/2022 03:08 PM "       Assessment/Plan  36 y.o. male s/p I&D of RIGHT perineal/scrotal abscess with Dr. Adams 3/17/22 with 5cm abscess     -Patient continues to improve, wound care packing wound with iodoform gauze at this time. Patient's pain is tolerable in the region but burns when touched. The wound does not demonstrate signs of rubén's gangrene or cellulitis. The wound is clean without erythema/discharge noted.     -Continue with ABX per medicine  -We will have the patient follow up with us in clinic in ~10 days for follow up. We will contact him regarding this visit. Please contact us with any questions or issues.        LAZARA Dacosta.-C.   [id number]  [place] [id number]   [phone]

## 2022-03-19 NOTE — CARE PLAN
The patient is Stable - Low risk of patient condition declining or worsening    Shift Goals  Clinical Goals: Pt pain will be at a stated comfort level of 3/10 after pain intervention  Patient Goals: pain control    Progress made toward(s) clinical / shift goals:  Pt has not requested pain medication since taking over care at 0030. Pt able to sleep from 0030- current time.     Patient is not progressing towards the following goals: n/a      Problem: Pain - Standard  Goal: Alleviation of pain or a reduction in pain to the patient’s comfort goal  Outcome: Progressing     Problem: Wound/ / Incision Healing  Goal: Patient's wound/surgical incision will decrease in size and heals properly  Outcome: Progressing     Problem: Knowledge Deficit - Standard  Goal: Patient and family/care givers will demonstrate understanding of plan of care, disease process/condition, diagnostic tests and medications  Outcome: Progressing     Problem: Physical Regulation  Goal: Diagnostic test results will improve  Outcome: Progressing  Goal: Signs and symptoms of infection will decrease  Outcome: Progressing

## 2022-03-19 NOTE — PROGRESS NOTES
Assumed pt care. AOx4. Pt states he has 6/10 pain but declines medication at this point. Pt will wait until it is time to do a dressing change   Denies any other distress.  Discussed POC.  Pt verbalized understanding.  Hourly rounding in place. Fall precautions in place and call lights w/in reach.

## 2022-03-19 NOTE — PROGRESS NOTES
Infectious Disease Progress Note    Author: Ghislaine Shelley M.D.. Date & Time created: 3/19/2022  3:42 PM    Interval History:  Rx: Unasyn    3/18: Malaise has evaporated since surgery. Had some bleeding in the toilet after surgery last night. Afebrile since admission. WBC 10,600. . Lactic acid 2.5 on admission and down to 1.9. Admission blood cultures negative. CT showed a 1.8 x 5.7 cm abscess in the right perineum tracking to the scrotum prior to surgery. Bilat inguinal adenopathy.  I&D of perineal abscess 3/17 with culture negative to date. Gram stain showed many WBC but no organisms.  3/19: States pain is different. Was not told wound would need packing daily.  Was hoping to wait for wound care team.  Needy.  Review of Systems:  Review of Systems   Constitutional: Negative for chills, fever and malaise/fatigue.   HENT: Negative for sore throat.    Respiratory: Negative for cough and shortness of breath.    Cardiovascular: Negative for chest pain.   Gastrointestinal: Negative for abdominal pain, diarrhea, nausea and vomiting.   Genitourinary: Negative for dysuria.        Perineal pain.    Musculoskeletal: Negative for joint pain and myalgias.   Skin: Negative for rash.   Neurological: Negative for dizziness and headaches.     Physical Exam:  Physical Exam  Constitutional:       General: He is not in acute distress.     Appearance: He is obese.   HENT:      Head: Normocephalic.      Mouth/Throat:      Mouth: Mucous membranes are moist.      Pharynx: No oropharyngeal exudate.   Eyes:      Conjunctiva/sclera: Conjunctivae normal.   Cardiovascular:      Rate and Rhythm: Normal rate and regular rhythm.   Pulmonary:      Effort: Pulmonary effort is normal.      Breath sounds: Normal breath sounds.   Abdominal:      General: Bowel sounds are normal. There is no distension.      Palpations: Abdomen is soft.      Tenderness: There is no abdominal tenderness.   Genitourinary:     Comments: 1-2 cm right inguinal lymph  node not really tender.  Induration around incision: R> L.  Musculoskeletal:      Right lower leg: No edema.      Left lower leg: No edema.   Skin:     General: Skin is warm and dry.      Findings: No rash.   Neurological:      General: No focal deficit present.      Mental Status: He is oriented to person, place, and time.   Psychiatric:         Mood and Affect: Mood normal.       Labs:  Recent Results (from the past 24 hour(s))   POCT glucose device results    Collection Time: 03/18/22  4:31 PM   Result Value Ref Range    POC Glucose, Blood 153 (H) 65 - 99 mg/dL   POCT glucose device results    Collection Time: 03/18/22  8:51 PM   Result Value Ref Range    POC Glucose, Blood 217 (H) 65 - 99 mg/dL   CBC WITH DIFFERENTIAL    Collection Time: 03/19/22  1:46 AM   Result Value Ref Range    WBC 8.2 4.8 - 10.8 K/uL    RBC 5.07 4.70 - 6.10 M/uL    Hemoglobin 14.3 14.0 - 18.0 g/dL    Hematocrit 43.6 42.0 - 52.0 %    MCV 86.0 81.4 - 97.8 fL    MCH 28.2 27.0 - 33.0 pg    MCHC 32.8 (L) 33.7 - 35.3 g/dL    RDW 38.4 35.9 - 50.0 fL    Platelet Count 291 164 - 446 K/uL    MPV 8.6 (L) 9.0 - 12.9 fL    Neutrophils-Polys 52.50 44.00 - 72.00 %    Lymphocytes 34.20 22.00 - 41.00 %    Monocytes 6.50 0.00 - 13.40 %    Eosinophils 2.30 0.00 - 6.90 %    Basophils 1.20 0.00 - 1.80 %    Immature Granulocytes 3.30 (H) 0.00 - 0.90 %    Nucleated RBC 0.00 /100 WBC    Neutrophils (Absolute) 4.29 1.82 - 7.42 K/uL    Lymphs (Absolute) 2.80 1.00 - 4.80 K/uL    Monos (Absolute) 0.53 0.00 - 0.85 K/uL    Eos (Absolute) 0.19 0.00 - 0.51 K/uL    Baso (Absolute) 0.10 0.00 - 0.12 K/uL    Immature Granulocytes (abs) 0.27 (H) 0.00 - 0.11 K/uL    NRBC (Absolute) 0.00 K/uL   Comp Metabolic Panel    Collection Time: 03/19/22  1:46 AM   Result Value Ref Range    Sodium 137 135 - 145 mmol/L    Potassium 3.8 3.6 - 5.5 mmol/L    Chloride 101 96 - 112 mmol/L    Co2 25 20 - 33 mmol/L    Anion Gap 11.0 7.0 - 16.0    Glucose 149 (H) 65 - 99 mg/dL    Bun 13 8 - 22  mg/dL    Creatinine 0.72 0.50 - 1.40 mg/dL    Calcium 9.0 8.4 - 10.2 mg/dL    AST(SGOT) 53 (H) 12 - 45 U/L    ALT(SGPT) 106 (H) 2 - 50 U/L    Alkaline Phosphatase 94 30 - 99 U/L    Total Bilirubin 0.3 0.1 - 1.5 mg/dL    Albumin 3.9 3.2 - 4.9 g/dL    Total Protein 7.0 6.0 - 8.2 g/dL    Globulin 3.1 1.9 - 3.5 g/dL    A-G Ratio 1.3 g/dL   ESTIMATED GFR    Collection Time: 03/19/22  1:46 AM   Result Value Ref Range    GFR (CKD-EPI) 120 >60 mL/min/1.73 m 2   POCT glucose device results    Collection Time: 03/19/22  6:12 AM   Result Value Ref Range    POC Glucose, Blood 135 (H) 65 - 99 mg/dL   POCT glucose device results    Collection Time: 03/19/22 10:49 AM   Result Value Ref Range    POC Glucose, Blood 188 (H) 65 - 99 mg/dL     Results     Procedure Component Value Units Date/Time    GRAM STAIN [[id number]] Collected: 03/17/22 1609    Order Status: Completed Specimen: Wound Updated: 03/18/22 0824     Significant Indicator .     Source WND     Site Scrotal Fluid     Gram Stain Result Many WBCs.  No organisms seen.      Anaerobic Culture [[id number]] Collected: 03/17/22 1609    Order Status: Completed Specimen: Wound Updated: 03/18/22 0823     Significant Indicator NEG     Source WND     Site Scrotal Fluid     Culture Result Culture in progress.    CULTURE WOUND W/ GRAM STAIN [[id number]] Collected: 03/17/22 1609    Order Status: No result Specimen: Wound Updated: 03/18/22 0823     Significant Indicator NEG     Source WND     Site Scrotal Fluid     Culture Result -     Gram Stain Result Many WBCs.  No organisms seen.      BLOOD CULTURE [[id number]] Collected: 03/16/22 1508    Order Status: Completed Specimen: Blood from Peripheral Updated: 03/17/22 0731     Significant Indicator NEG     Source BLD     Site PERIPHERAL     Culture Result No Growth    BLOOD CULTURE [[id number]] Collected: 03/16/22 1556    Order Status: Completed Specimen: Blood from Peripheral Updated: 03/17/22 0731     Significant Indicator NEG     Source BLD      Site PERIPHERAL     Culture Result No Growth      URINALYSIS [[id number]]  (Abnormal) Collected: 22    Order Status: Completed Specimen: Urine, Clean Catch Updated: 22     Color Yellow     Character Clear     Specific Gravity >=1.030     Ph 5.0     Glucose 100 mg/dL      Ketones Negative mg/dL      Protein Negative mg/dL      Bilirubin Negative     Nitrite Negative     Leukocyte Esterase Negative     Occult Blood Negative     Micro Urine Req see below    URINE CULTURE(NEW) [[id number]] Collected: 22    Order Status: Sent Specimen: Urine, Clean Catch Updated: 22     Hemodynamics:  Temp (24hrs), Av.7 °C (98 °F), Min:36.4 °C (97.5 °F), Max:36.8 °C (98.2 °F)  Temperature: 36.8 °C (98.2 °F)  Pulse  Av  Min: 54  Max: 101   Blood Pressure: 117/48     Peripheral IV 22 20 G Left Forearm (Active)   Site Assessment Clean;Dry;Intact 220      Fluids:  Intake/Output                             22 0700 - 22 0659 22 0700 - 22 0659 22 0700 - 22 0659     4899-7417 7482-4186 Total 6895-2431 7928-2946 Total 7344-3134 5497-1730 Total                    Intake    P.O.  --  1000 1000  500  -- 500  120  -- 120    I.V.  --  262.8 262.8  700  -- 700  --  -- --    IV Piggyback  --  200 200  --  -- --  --  -- --    Total Intake -- 1462.8 1462.8 1200 -- 1200 120 -- 120        Medications:  Current Facility-Administered Medications   Medication Last Admin   • senna-docusate (PERICOLACE or SENOKOT S) 8.6-50 MG per tablet 2 Tablet     • insulin regular (HumuLIN R,NovoLIN R) injection 2 Units at 22 0612   • piperacillin-tazobactam (ZOSYN) 4.5 g in  mL IVPB 4.5 g at 22 0607   • hydrALAZINE (APRESOLINE) injection 20 mg     • labetalol (NORMODYNE/TRANDATE) injection 10 mg     • metoclopramide (REGLAN) injection 10 mg       Medical Decision Making, by Problem:  Active Hospital Problems    Diagnosis    • *Perineal abscess [L02.215]  Group B Streptococcus    • DM (diabetes mellitus) ([facility]) [E11.9]    • Dehydration [E86.0]    • Elevated blood pressure reading [R03.0]    • Lactic acidosis [E87.2]      Plan:  1.  Continue Unasyn  2.  Monitor wound.    Anticipate he would benefit from IV therapy for a bit longer.  ? If he will need as an outpatient.  Will decide Sun/Monday.  More concerned how he is going to get his packing and wound care as an oupatient.  Home health/wound care clinic.  Don't trust patient to do it himself.  In the past, have had issues with compliance.    25 min, >50% in direct care and coordination. D/w patient, wife .

## 2022-03-19 NOTE — PROGRESS NOTES
RN notified MD Hurley of pt uncontrolled pain. Pt requested to have more pain medication before dressing change was done. MD Hurley placed an order for a 1x dose of 1mg dilaudid to be given in addition to the  1mg of dilaudid PRN. Pt will receive 2mg of dilaudid, okay per MD, as long as pt can tolerate and has stable vitals.

## 2022-03-19 NOTE — PROGRESS NOTES
Assumed care of pt at 0030. Report received from Chichi OLEARY. Pt stated perineal pain is at a comfortable level at this time. Perineal dressing is CDI. Pt has cpap on. No other needs at this time, call light in reach, bed in lowest position.

## 2022-03-20 PROBLEM — E86.0 DEHYDRATION: Status: RESOLVED | Noted: 2022-03-16 | Resolved: 2022-03-20

## 2022-03-20 LAB
ALBUMIN SERPL BCP-MCNC: 4.1 G/DL (ref 3.2–4.9)
ALBUMIN/GLOB SERPL: 1.4 G/DL
ALP SERPL-CCNC: 93 U/L (ref 30–99)
ALT SERPL-CCNC: 107 U/L (ref 2–50)
ANION GAP SERPL CALC-SCNC: 13 MMOL/L (ref 7–16)
AST SERPL-CCNC: 59 U/L (ref 12–45)
BILIRUB SERPL-MCNC: 0.3 MG/DL (ref 0.1–1.5)
BUN SERPL-MCNC: 11 MG/DL (ref 8–22)
CALCIUM SERPL-MCNC: 9.4 MG/DL (ref 8.4–10.2)
CHLORIDE SERPL-SCNC: 100 MMOL/L (ref 96–112)
CO2 SERPL-SCNC: 24 MMOL/L (ref 20–33)
CREAT SERPL-MCNC: 0.75 MG/DL (ref 0.5–1.4)
GFR SERPLBLD CREATININE-BSD FMLA CKD-EPI: 119 ML/MIN/1.73 M 2
GLOBULIN SER CALC-MCNC: 2.9 G/DL (ref 1.9–3.5)
GLUCOSE BLD STRIP.AUTO-MCNC: 129 MG/DL (ref 65–99)
GLUCOSE BLD STRIP.AUTO-MCNC: 135 MG/DL (ref 65–99)
GLUCOSE BLD STRIP.AUTO-MCNC: 178 MG/DL (ref 65–99)
GLUCOSE BLD STRIP.AUTO-MCNC: 180 MG/DL (ref 65–99)
GLUCOSE SERPL-MCNC: 151 MG/DL (ref 65–99)
POTASSIUM SERPL-SCNC: 4 MMOL/L (ref 3.6–5.5)
PROT SERPL-MCNC: 7 G/DL (ref 6–8.2)
SODIUM SERPL-SCNC: 137 MMOL/L (ref 135–145)

## 2022-03-20 PROCEDURE — 80053 COMPREHEN METABOLIC PANEL: CPT

## 2022-03-20 PROCEDURE — 700102 HCHG RX REV CODE 250 W/ 637 OVERRIDE(OP): Performed by: STUDENT IN AN ORGANIZED HEALTH CARE EDUCATION/TRAINING PROGRAM

## 2022-03-20 PROCEDURE — 770006 HCHG ROOM/CARE - MED/SURG/GYN SEMI*

## 2022-03-20 PROCEDURE — 94760 N-INVAS EAR/PLS OXIMETRY 1: CPT

## 2022-03-20 PROCEDURE — 82962 GLUCOSE BLOOD TEST: CPT | Mod: 91

## 2022-03-20 PROCEDURE — A9270 NON-COVERED ITEM OR SERVICE: HCPCS | Performed by: HOSPITALIST

## 2022-03-20 PROCEDURE — 99233 SBSQ HOSP IP/OBS HIGH 50: CPT | Performed by: STUDENT IN AN ORGANIZED HEALTH CARE EDUCATION/TRAINING PROGRAM

## 2022-03-20 PROCEDURE — 700102 HCHG RX REV CODE 250 W/ 637 OVERRIDE(OP): Performed by: HOSPITALIST

## 2022-03-20 PROCEDURE — 700105 HCHG RX REV CODE 258: Performed by: INTERNAL MEDICINE

## 2022-03-20 PROCEDURE — 36415 COLL VENOUS BLD VENIPUNCTURE: CPT

## 2022-03-20 PROCEDURE — 94660 CPAP INITIATION&MGMT: CPT

## 2022-03-20 PROCEDURE — A9270 NON-COVERED ITEM OR SERVICE: HCPCS | Performed by: STUDENT IN AN ORGANIZED HEALTH CARE EDUCATION/TRAINING PROGRAM

## 2022-03-20 PROCEDURE — 700111 HCHG RX REV CODE 636 W/ 250 OVERRIDE (IP): Performed by: INTERNAL MEDICINE

## 2022-03-20 PROCEDURE — 700111 HCHG RX REV CODE 636 W/ 250 OVERRIDE (IP): Performed by: STUDENT IN AN ORGANIZED HEALTH CARE EDUCATION/TRAINING PROGRAM

## 2022-03-20 RX ADMIN — HYDROMORPHONE HYDROCHLORIDE 1 MG: 1 INJECTION, SOLUTION INTRAMUSCULAR; INTRAVENOUS; SUBCUTANEOUS at 17:46

## 2022-03-20 RX ADMIN — SODIUM CHLORIDE 3 G: 900 INJECTION INTRAVENOUS at 12:08

## 2022-03-20 RX ADMIN — KETOROLAC TROMETHAMINE 30 MG: 30 INJECTION, SOLUTION INTRAMUSCULAR at 12:09

## 2022-03-20 RX ADMIN — HYDROMORPHONE HYDROCHLORIDE 1 MG: 1 INJECTION, SOLUTION INTRAMUSCULAR; INTRAVENOUS; SUBCUTANEOUS at 13:41

## 2022-03-20 RX ADMIN — HYDROMORPHONE HYDROCHLORIDE 1 MG: 1 INJECTION, SOLUTION INTRAMUSCULAR; INTRAVENOUS; SUBCUTANEOUS at 09:19

## 2022-03-20 RX ADMIN — SODIUM CHLORIDE 3 G: 900 INJECTION INTRAVENOUS at 01:12

## 2022-03-20 RX ADMIN — OXYCODONE HYDROCHLORIDE 10 MG: 10 TABLET ORAL at 20:22

## 2022-03-20 RX ADMIN — OXYCODONE HYDROCHLORIDE 10 MG: 10 TABLET ORAL at 01:05

## 2022-03-20 RX ADMIN — SODIUM CHLORIDE 3 G: 900 INJECTION INTRAVENOUS at 06:25

## 2022-03-20 RX ADMIN — OXYCODONE HYDROCHLORIDE 5 MG: 5 TABLET ORAL at 12:19

## 2022-03-20 RX ADMIN — SODIUM CHLORIDE 3 G: 900 INJECTION INTRAVENOUS at 17:47

## 2022-03-20 RX ADMIN — OXYCODONE HYDROCHLORIDE 5 MG: 5 TABLET ORAL at 16:45

## 2022-03-20 RX ADMIN — KETOROLAC TROMETHAMINE 30 MG: 30 INJECTION, SOLUTION INTRAMUSCULAR at 01:12

## 2022-03-20 RX ADMIN — OXYCODONE HYDROCHLORIDE 10 MG: 10 TABLET ORAL at 06:24

## 2022-03-20 RX ADMIN — HYDROMORPHONE HYDROCHLORIDE 1 MG: 1 INJECTION, SOLUTION INTRAMUSCULAR; INTRAVENOUS; SUBCUTANEOUS at 21:24

## 2022-03-20 RX ADMIN — HYDROMORPHONE HYDROCHLORIDE 1 MG: 1 INJECTION, SOLUTION INTRAMUSCULAR; INTRAVENOUS; SUBCUTANEOUS at 02:55

## 2022-03-20 RX ADMIN — KETOROLAC TROMETHAMINE 30 MG: 30 INJECTION, SOLUTION INTRAMUSCULAR at 06:24

## 2022-03-20 RX ADMIN — INSULIN HUMAN 1 UNITS: 100 INJECTION, SOLUTION PARENTERAL at 10:52

## 2022-03-20 RX ADMIN — Medication 1 CAPSULE: at 07:46

## 2022-03-20 RX ADMIN — KETOROLAC TROMETHAMINE 30 MG: 30 INJECTION, SOLUTION INTRAMUSCULAR at 17:47

## 2022-03-20 RX ADMIN — INSULIN HUMAN 1 UNITS: 100 INJECTION, SOLUTION PARENTERAL at 20:25

## 2022-03-20 ASSESSMENT — ENCOUNTER SYMPTOMS
FOCAL WEAKNESS: 0
BLURRED VISION: 0
CONSTIPATION: 0
SPUTUM PRODUCTION: 0
VOMITING: 0
CHILLS: 0
NERVOUS/ANXIOUS: 0
FEVER: 0
PALPITATIONS: 0
DOUBLE VISION: 0
DIZZINESS: 0
NAUSEA: 0
SORE THROAT: 0
HEADACHES: 0
SHORTNESS OF BREATH: 0
COUGH: 0
DIARRHEA: 0
WHEEZING: 0
SINUS PAIN: 0
MYALGIAS: 0
ABDOMINAL PAIN: 0

## 2022-03-20 ASSESSMENT — PAIN DESCRIPTION - PAIN TYPE
TYPE: ACUTE PAIN

## 2022-03-20 NOTE — PROGRESS NOTES
".  [facility] [place] Progress Note    Service: [facility] [place]  Patient's Name: Connor Jacinto  MRN: [id number]  Admit Date:3/16/2022  Today's Date: 3/18/2022   Room #: 2209/02      Identification:  36 y.o. male s/p I&D of RIGHT perineal/scrotal abscess with Dr. Adams 3/17/22 with 5cm abscess     Subjective/ROS:   Patient with continued improvement. He is receiving Unasyn at this time. He is urinating on his own with a strong stream and no dysuria. Wound care is also in the room and just finished packing the wound and his partner present states she feels confident she can pack the wound at home.     Physical Exam:  Current Vitals:   /48   Pulse 64   Temp 36.8 °C (98.2 °F) (Oral)   Resp 18   Ht 1.753 m (5' 9\")   Wt (!) 158 kg (348 lb 5.2 oz)   SpO2 95%   BMI 51.44 kg/m²     03/17 1900 - 03/19 0659  In: 900 [P.O.:600]  Out: -     GEN : NAD, A&O X4   RES:  no acute respiratory distress  :   Right perineal incision with iodoform packing, no erythema or purulent discharge noted around wound. No signs of scrotal cellulitis, no fluctuance, no crepitus of scrotum. No scrotal TTP.     Labs:   Recent Labs     03/17/22  0430 03/18/22  0309 03/19/22  0146   SODIUM 134* 134* 137   POTASSIUM 3.7 4.1 3.8   CHLORIDE 100 100 101   CO2 18* 21 25   GLUCOSE 152* 195* 149*   BUN 9 10 13   CREATININE 0.71 0.58 0.72   CALCIUM 9.1 9.4 9.0     Recent Labs     03/17/22  0430 03/18/22  0309 03/19/22  0146   WBC 7.5 10.6 8.2   RBC 4.88 5.45 5.07   HEMOGLOBIN 13.6* 15.2 14.3   HEMATOCRIT 42.5 45.0 43.6   MCV 87.1 82.6 86.0   MCH 27.9 27.9 28.2   MCHC 32.0* 33.8 32.8*   RDW 40.0 36.2 38.4   PLATELETCT 246 360 291   MPV 8.7* 8.7* 8.6*     Lab Results   Component Value Date/Time    GLUCOSE 149 (H) 03/19/2022 01:46 AM    GLUCOSE 195 (H) 03/18/2022 03:09 AM    GLUCOSE 152 (H) 03/17/2022 04:30 AM    GLUCOSE 212 (H) 03/16/2022 03:08 PM       Assessment/Plan  36 y.o. male s/p I&D of RIGHT perineal/scrotal abscess with Dr. Adams 3/17/22 with 5cm " abscess     -Patient continues to improve, wound care packing wound with iodoform gauze at this time. The PE is not concerning for worsening infection or rubén's gangrene. Wound care finished packing his wound and patient's partner reports that she feels she can pack the dressing daily. If there is any issue with this we can have the patient come in to our clinic daily to have this packed.    -Continue with ABX per medicine.   -No further acute urologic intervention at this time. Urology signing off. Please contact us with questions or issues.   -We will have the patient follow up with us in clinic in ~10 days for follow up. We will contact him regarding this visit.        LAZARA Dacosta.-C.   [address]  [place] [id number]   [phone]

## 2022-03-20 NOTE — PROGRESS NOTES
Hospital Medicine Daily Progress Note    Date of Service  3/20/2022    Chief Complaint  Connor Jacinto is a 36 y.o. male admitted 3/16/2022 with groin infection since approx 3/4/22    Hospital Course  A 36 y.o. male with a past medical history of diabetes, obesity, recent perineal infection who follows with [facility] infectious disease, Dr. Analisa Hidalgo , who presented 3/16/2022 with worsening perineal pain and swelling.  Patient has been having perineal pain swelling and chills over the past week.  He was hospitalized at [facility] and received antibiotics intravenously and was discharged ( AMA ) on orals however his symptoms continue to worsen.  He was seen by his infectious disease doctor on 3/16 and was told to go directly to the hospital for intravenous antibiotics.     Interval Problem Update  Patient was seen and examined at bedside.    C/o 7/10 pain from surgical site . Pt is till needing IV Pain meds. Pain control -Multimodal pain control.  BG - controlled   No dysuria and voiding okay.  IV Zosyn switched to IV Unasyn for ID on 3/18  status post incision and drainage right scrotal abscess by urology on 3/17  Wound care - no wound VAC needed, daily packing  Still on IV ABx & ID will decide the final ABx recommendation     I have personally seen and examined the patient at bedside. I discussed the plan of care with patient, family, bedside RN, charge RN,  and pharmacy.    Consultants/Specialty  general surgery and infectious disease    Code Status  Full Code    Disposition  Patient is not medically cleared for discharge.   Anticipate discharge to TBD.  I have placed the appropriate orders for post-discharge needs.    Review of Systems  Review of Systems   Constitutional: Positive for malaise/fatigue. Negative for chills and fever.   HENT: Negative for congestion, ear discharge, ear pain, sinus pain and sore throat.    Eyes: Negative for blurred vision and double vision.   Respiratory: Negative for  cough, sputum production, shortness of breath and wheezing.    Cardiovascular: Negative for chest pain, palpitations and leg swelling.   Gastrointestinal: Negative for abdominal pain, constipation, diarrhea, nausea and vomiting.   Genitourinary: Negative for dysuria, frequency and urgency.        Scrotal Wound - packing in placed. S/p I & D   Musculoskeletal: Negative for myalgias.   Neurological: Negative for dizziness, focal weakness and headaches.   Psychiatric/Behavioral: The patient is not nervous/anxious.         Physical Exam  Temp:  [36.6 °C (97.8 °F)-37 °C (98.6 °F)] 37 °C (98.6 °F)  Pulse:  [60-74] 60  Resp:  [18] 18  BP: (104-134)/(48-88) 127/65  SpO2:  [93 %-97 %] 96 %    Physical Exam  Constitutional:       General: He is not in acute distress.     Appearance: He is obese.   HENT:      Head: Normocephalic and atraumatic.      Nose: Nose normal.      Mouth/Throat:      Mouth: Mucous membranes are moist.      Pharynx: No posterior oropharyngeal erythema.   Eyes:      General: No scleral icterus.     Extraocular Movements: Extraocular movements intact.      Conjunctiva/sclera: Conjunctivae normal.      Pupils: Pupils are equal, round, and reactive to light.   Cardiovascular:      Rate and Rhythm: Normal rate and regular rhythm.      Pulses: Normal pulses.      Heart sounds: Normal heart sounds. No murmur heard.    No gallop.   Pulmonary:      Effort: Pulmonary effort is normal.      Breath sounds: Normal breath sounds. No stridor. No wheezing, rhonchi or rales.   Abdominal:      General: Bowel sounds are normal.      Palpations: Abdomen is soft.   Genitourinary:     Comments: Scrotal Wound - packing in placed. S/p I & D  Musculoskeletal:         General: No swelling or tenderness.      Cervical back: Normal range of motion and neck supple. No rigidity.   Skin:     General: Skin is warm.   Neurological:      General: No focal deficit present.      Mental Status: He is alert and oriented to person, place,  and time.   Psychiatric:         Mood and Affect: Mood normal.         Behavior: Behavior normal.         Fluids    Intake/Output Summary (Last 24 hours) at 3/20/2022 1027  Last data filed at 3/20/2022 0758  Gross per 24 hour   Intake 920 ml   Output --   Net 920 ml       Laboratory  Recent Labs     03/18/22  0309 03/19/22  0146   WBC 10.6 8.2   RBC 5.45 5.07   HEMOGLOBIN 15.2 14.3   HEMATOCRIT 45.0 43.6   MCV 82.6 86.0   MCH 27.9 28.2   MCHC 33.8 32.8*   RDW 36.2 38.4   PLATELETCT 360 291   MPV 8.7* 8.6*     Recent Labs     03/18/22  0309 03/19/22  0146   SODIUM 134* 137   POTASSIUM 4.1 3.8   CHLORIDE 100 101   CO2 21 25   GLUCOSE 195* 149*   BUN 10 13   CREATININE 0.58 0.72   CALCIUM 9.4 9.0                   Imaging  [id number]   Final Result      1.  No evidence of testicular mass or torsion.      2.  Complex fluid collection involving the low right perineum extending into the inferior scrotal sac measuring 5.6 x 2.9 x 2.5 cm in size possibly representing abscess.      CT-ABDOMEN-PELVIS W/O   Final Result      1.  Inflammatory changes at the right perineum tracking to the right scrotum with 5.7 x 1.8 cm fluid collection, consistent with cellulitis in the correct clinical setting. There is no soft tissue gas to confirm Iveth's gangrene, however Iveth's    gangrene cannot be excluded.   2.  Likely reactive bilateral inguinal lymphadenopathy.      DX-CHEST-PORTABLE (1 VIEW)   Final Result      1.  There is no acute cardiopulmonary process.           Assessment/Plan  * Perineal abscess- (present on admission)  Assessment & Plan  No crepitus on examination to suggest Iveth's gangrene  Sent by [facility] infectious disease, Dr Analisa Hidalgo for intravenous antibiotics.  Reviewed CT A/P and a scrotal ultrasound.  IV Zosyn switched to IV Unasyn for ID on 3/18  status post incision and drainage right scrotal abscess by urology on 3/17  Wound care - no wound VAC needed, daily packing  Still on IV ABx & ID  will decide the final ABx recommendation       Lactic acidosis- (present on admission)  Assessment & Plan  Likely secondary to reduced intravascular volume secondary to dehydration.  Resolved with IVF     Elevated blood pressure reading- (present on admission)  Assessment & Plan  Likely secondary to severe pain.  Multimodal pain control  Consider starting scheduled medications according to trend    DM (diabetes mellitus) (HCC)- (present on admission)  Assessment & Plan  With hyperglycemia  Last glycated hemoglobin was 11.2 %  ISS  Accu-Checks, hypoglycemia protocol         VTE prophylaxis: SCDs/TEDs and enoxaparin ppx     I have performed a physical exam and reviewed and updated ROS and Plan today (3/20/2022). In review of yesterday's note (3/19/2022), there are no changes except as documented above.

## 2022-03-20 NOTE — WOUND TEAM
"[facility]  Inpatient Services  Initial Wound and Skin Care Evaluation    Admission Date: 3/16/2022     Last order of IP CONSULT TO WOUND CARE was found on 3/17/2022 from Hospital Encounter on 3/16/2022     HPI, PMH, SH: Reviewed    Past Surgical History:   Procedure Laterality Date   • OR INCIS/DRAIN SCROTUM/TESTIS,EPIDIDYM  3/17/2022    Procedure: INCISION AND DRAINAGE, SCROTUM;  Surgeon: James Adams M.D.;  Location: SURGERY [facility];  Service: Urology   • HERNIA REPAIR     • KNEE RECONSTRUCTION       Social History     Tobacco Use   • Smoking status: Former Smoker     Types: Cigarettes   • Smokeless tobacco: Current User     Types: Chew   • Tobacco comment: vape   Substance Use Topics   • Alcohol use: Yes     Comment: 3-4 times amonth     Chief Complaint   Patient presents with   • Groin Pain     Has had a groin infection since approx 3/4/22  Admitted to [place] 3/5 and left AMA due to a \" bad experience \"   On augmentin currently  Followed by ID and told to come to ED       Diagnosis: Perineal abscess [L02.215]    Unit where seen by Wound Team: 2209/02     WOUND CONSULT/FOLLOW UP RELATED TO:  Perineal/scrotal wound packing vs VAC    WOUND HISTORY:  Patient admitted following a groin infection. On 3/17, he underwent an I&D with Dr. Adams.     WOUND ASSESSMENT/LDA        Wound 03/17/22 Incision Perineum Right open surgical incision (Active)   Wound Image   03/19/22 1700   Site Assessment Red;Drainage 03/19/22 1700   Periwound Assessment Intact 03/19/22 1700   Margins Defined edges;Unattached edges 03/19/22 1700   Closure Secondary intention 03/19/22 1700   Drainage Amount Small 03/19/22 1700   Drainage Description Serosanguineous 03/19/22 1700   Treatments Cleansed;Site care 03/19/22 1700   Wound Cleansing Normal Saline Irrigation 03/19/22 1700   Periwound Protectant Skin Protectant Wipes to Periwound 03/19/22 1700   Dressing Cleansing/Solutions Not Applicable 03/19/22 1700   Dressing " Options Collagen Dressing;Plain Strip Packing;Absorbent Abdominal Pad 03/19/22 1700   Dressing Changed New 03/19/22 1700   Dressing Status Clean;Dry;Intact 03/19/22 1700   Dressing Change/Treatment Frequency Daily, and As Needed 03/19/22 1700   NEXT Dressing Change/Treatment Date 03/20/22 03/19/22 1700   Non-staged Wound Description Full thickness 03/19/22 1700   Wound Length (cm) 2 cm 03/19/22 1700   Wound Width (cm) 0.5 cm 03/19/22 1700   Wound Depth (cm) 3.5 cm 03/19/22 1700   Wound Surface Area (cm^2) 1 cm^2 03/19/22 1700   Wound Volume (cm^3) 3.5 cm^3 03/19/22 1700   Tunneling (cm) 4.2 cm 03/19/22 1700   Shape oval 03/19/22 1700   Wound Odor None 03/19/22 1700   Exposed Structures None 03/19/22 1700   WOUND NURSE ONLY - Time Spent with Patient (mins) 45 03/19/22 1700          Vascular:    EDA:   No results found.    Lab Values:    Lab Results   Component Value Date/Time    WBC 8.2 03/19/2022 01:46 AM    RBC 5.07 03/19/2022 01:46 AM    HEMOGLOBIN 14.3 03/19/2022 01:46 AM    HEMATOCRIT 43.6 03/19/2022 01:46 AM    HBA1C 11.2 (H) 03/06/2022 05:33 AM        Culture Results show:  No results found for this or any previous visit (from the past 720 hour(s)).    Pain Level/Medicated:  IV Morphine       INTERVENTIONS BY WOUND TEAM:  Chart and images reviewed. Discussed with bedside RN. All areas of concern (based on picture review, LDA review and discussion with bedside RN) have been thoroughly assessed. Documentation of areas based on significant findings. This RN in to assess patient. Performed standard wound care which includes appropriate positioning, dressing removal and non-selective debridement. Pictures and measurements obtained weekly if/when required.  Preparation for Dressing removal: Dressing soaked with NS  Non-selectively Debrided with:  NS and gauze.  Sharp debridement: NA  Shae wound: Cleansed with NS and gauze, Prepped with no sting skin prep   Primary Dressing: Plain strip packing moistened with NS    Secondary (Outer) Dressing: dry gauze and mesh underwear     Interdisciplinary consultation: Patient, Bedside RN, Wound RN Kelvin Panchal PA-C    EVALUATION / RATIONALE FOR TREATMENT:  Most Recent Date:  3/19/22: Perineal and scrotal abscess wound following I&D is clean, red, and with small serosanguinous drainage. Wound packed with bruce to encourage tissue granulation and plain strip packing. Patient declined VAC to area as he would prefer packing instead. His wife has packed his previous wounds before and is comfortable packing his current wound following discharge.      Goals: Steady decrease in wound area and depth weekly.    WOUND TEAM PLAN OF CARE ([X] for frequency of wound follow up,):   Nursing to follow orders written for wound care. Contact wound team if area fails to progress, deteriorates or with any questions/concerns  Dressing changes by wound team:                   Follow up 3 times weekly:                NPWT change 3 times weekly:     Follow up 1-2 times weekly:   X - follow up on Wed  3/23  Follow up Bi-Monthly:                   Follow up as needed:     Other (explain):     NURSING PLAN OF CARE ORDERS (X):  Dressing changes: See Dressing Care orders: X  Skin care: See Skin Care orders:   RN Prevention Protocol:   Rectal tube care: See Rectal Tube Care orders:   Other orders:    RSKIN:   CURRENTLY IN PLACE (X), APPLIED THIS VISIT (A), ORDERED (O):   Q shift Elliott:  X  Q shift pressure point assessments:  X    Surface/Positioning   Pressure redistribution mattress   X         Low Airloss          Bariatric foam      Bariatric LETITIA     Waffle cushion        Waffle Overlay          Reposition q 2 hours      TAPs Turning system     Z Rodger Pillow     Mobilization       Up to chair      X  Ambulate    X  PT/OT      Nutrition   Dietician        Diabetes Education      PO   X  TF     TPN     NPO   # days     Other        Anticipated discharge plans:   LTACH:        SNF/Rehab:                   Home Health Care:       X?    Outpatient Wound Center:            Self/Family Care:    X     Other:                  Vac Discharge Needs:   Not Applicable Pt not on a wound vac:     X  Regular Vac while inpatient, alternative dressing at DC:        Regular Vac in use and continued at DC:            Reg. Vac w/ Skin Sub/Biologic in use. Will need to be changed 2x wkly:      Veraflo Vac while inpatient, ok to transition to Regular Vac on Discharge:           Veraflo Vac while inpatient, will need to remain on Veraflo Vac upon discharge:

## 2022-03-20 NOTE — PROGRESS NOTES
Received report from night RN, assumed care. POC discussed.   A&Ox4  Pain 4/10  Call light and belongings within reach.

## 2022-03-20 NOTE — PROGRESS NOTES
Infectious Disease Progress Note    Author: Ghislaine Shelley M.D.. Date & Time created: 3/20/2022  3:18 PM    Interval History:  Rx: Unasyn    3/18: Malaise has evaporated since surgery. Had some bleeding in the toilet after surgery last night. Afebrile since admission. WBC 10,600. . Lactic acid 2.5 on admission and down to 1.9. Admission blood cultures negative. CT showed a 1.8 x 5.7 cm abscess in the right perineum tracking to the scrotum prior to surgery. Bilat inguinal adenopathy.  I&D of perineal abscess 3/17 with culture negative to date. Gram stain showed many WBC but no organisms.  3/19: States pain is different. Was not told wound would need packing daily.  Was hoping to wait for wound care team.  Needy.  3/20: Just out of shower.  Wife a bit apprehensive about doing wound care at home.  Pt and wife seem to think home health would do every day.    Review of Systems:  Review of Systems   Constitutional: Negative for chills, fever and malaise/fatigue.   HENT: Negative for sore throat.    Respiratory: Negative for cough and shortness of breath.    Cardiovascular: Negative for chest pain.   Gastrointestinal: Negative for abdominal pain, diarrhea, nausea and vomiting.   Genitourinary: Negative for dysuria.        Perineal pain.    Musculoskeletal: Negative for joint pain and myalgias.   Skin: Negative for rash.   Neurological: Negative for dizziness and headaches.     Physical Exam:  Physical Exam  Constitutional:       General: He is not in acute distress.     Appearance: He is obese.   HENT:      Head: Normocephalic.      Mouth/Throat:      Mouth: Mucous membranes are moist.      Pharynx: No oropharyngeal exudate.   Eyes:      Conjunctiva/sclera: Conjunctivae normal.   Cardiovascular:      Rate and Rhythm: Normal rate and regular rhythm.   Pulmonary:      Effort: Pulmonary effort is normal.      Breath sounds: Normal breath sounds.   Abdominal:      General: Bowel sounds are normal. There is no distension.       Palpations: Abdomen is soft.      Tenderness: There is no abdominal tenderness.   Genitourinary:     Comments: 1-2 cm right inguinal lymph node not really tender.  Induration around incision: R> L.  Musculoskeletal:      Right lower leg: No edema.      Left lower leg: No edema.   Skin:     General: Skin is warm and dry.      Findings: No rash.   Neurological:      General: No focal deficit present.      Mental Status: He is oriented to person, place, and time.   Psychiatric:         Mood and Affect: Mood normal.       Labs:  Recent Results (from the past 24 hour(s))   POCT glucose device results    Collection Time: 03/19/22  4:41 PM   Result Value Ref Range    POC Glucose, Blood 214 (H) 65 - 99 mg/dL   POCT glucose device results    Collection Time: 03/19/22  8:48 PM   Result Value Ref Range    POC Glucose, Blood 210 (H) 65 - 99 mg/dL   POCT glucose device results    Collection Time: 03/20/22  6:32 AM   Result Value Ref Range    POC Glucose, Blood 129 (H) 65 - 99 mg/dL   POCT glucose device results    Collection Time: 03/20/22 10:27 AM   Result Value Ref Range    POC Glucose, Blood 180 (H) 65 - 99 mg/dL     Results     Procedure Component Value Units Date/Time    GRAM STAIN [[id number]] Collected: 03/17/22 1609    Order Status: Completed Specimen: Wound Updated: 03/18/22 0824     Significant Indicator .     Source WND     Site Scrotal Fluid     Gram Stain Result Many WBCs.  No organisms seen.      Anaerobic Culture [[id number]] Collected: 03/17/22 1609    Order Status: Completed Specimen: Wound Updated: 03/18/22 0823     Significant Indicator NEG     Source WND     Site Scrotal Fluid     Culture Result Culture in progress.    CULTURE WOUND W/ GRAM STAIN [[id number]] Collected: 03/17/22 1609    Order Status: No result Specimen: Wound Updated: 03/18/22 0823     Significant Indicator NEG     Source WND     Site Scrotal Fluid     Culture Result -     Gram Stain Result Many WBCs.  No organisms seen.      BLOOD CULTURE  [[id number]] Collected: 22 1508    Order Status: Completed Specimen: Blood from Peripheral Updated: 22     Significant Indicator NEG     Source BLD     Site PERIPHERAL     Culture Result No Growth    BLOOD CULTURE [[id number]] Collected: 22 1556    Order Status: Completed Specimen: Blood from Peripheral Updated: 22     Significant Indicator NEG     Source BLD     Site PERIPHERAL     Culture Result No Growth      URINALYSIS [[id number]]  (Abnormal) Collected: 22 1543    Order Status: Completed Specimen: Urine, Clean Catch Updated: 22     Color Yellow     Character Clear     Specific Gravity >=1.030     Ph 5.0     Glucose 100 mg/dL      Ketones Negative mg/dL      Protein Negative mg/dL      Bilirubin Negative     Nitrite Negative     Leukocyte Esterase Negative     Occult Blood Negative     Micro Urine Req see below    URINE CULTURE(NEW) [[id number]] Collected: 22    Order Status: Sent Specimen: Urine, Clean Catch Updated: 22     Hemodynamics:  Temp (24hrs), Av.8 °C (98.2 °F), Min:36.6 °C (97.8 °F), Max:37 °C (98.6 °F)  Temperature: 37 °C (98.6 °F)  Pulse  Av.2  Min: 54  Max: 101   Blood Pressure: 137/70     Peripheral IV 22 20 G Left Forearm (Active)   Site Assessment Clean;Dry;Intact 22 2200      Fluids:  Intake/Output                             22 0700 - 22 0659 22 0700 - 22 0659 22 0700 - 22 0659     [id number] 9285-0582 Total 4593-8650 2391-1020 Total 7901-6427 6197-8680 Total                    Intake    P.O.  --  1000 1000  500  -- 500  120  -- 120    I.V.  --  262.8 262.8  700  -- 700  --  -- --    IV Piggyback  --  200 200  --  -- --  --  -- --    Total Intake -- 1462.8 1462.8 1200 -- 1200 120 -- 120        Medications:  Current Facility-Administered Medications   Medication Last Admin   • senna-docusate (PERICOLACE or SENOKOT S) 8.6-50 MG per tablet 2 Tablet     • insulin regular  (HumuLIN R,NovoLIN R) injection 2 Units at 03/18/22 0612   • piperacillin-tazobactam (ZOSYN) 4.5 g in  mL IVPB 4.5 g at 03/18/22 0607   • hydrALAZINE (APRESOLINE) injection 20 mg     • labetalol (NORMODYNE/TRANDATE) injection 10 mg     • metoclopramide (REGLAN) injection 10 mg       Medical Decision Making, by Problem:  Active Hospital Problems    Diagnosis    • *Perineal abscess [L02.215] Group B Streptococcus    • DM (diabetes mellitus) (HCC) [E11.9]    • Dehydration [E86.0]    • Elevated blood pressure reading [R03.0]    • Lactic acidosis [E87.2]      Plan:  1.  Continue Unasyn  2.  Monitor wound.    Anticipate he would benefit from IV therapy for a bit longer.  Do not anticipate he would need longer than one week.  Will write order for PICC placement.  Will have office seek authorization of outpt IV therapy.    Discussed with wife and pt    35 min, >50% in face to face contact coordinating care/counseling/ordering PICC

## 2022-03-20 NOTE — CARE PLAN
The patient is Stable - Low risk of patient condition declining or worsening    Shift Goals  Clinical Goals: pt will have controlled pain as demonstrated by pt sleeping comfortably.  Patient Goals: Pain control    Progress made toward(s) clinical / shift goals:  pain controlled with prn and scheduled pain meds    Patient is not progressing towards the following goals: n/a

## 2022-03-21 ENCOUNTER — APPOINTMENT (OUTPATIENT)
Dept: RADIOLOGY | Facility: MEDICAL CENTER | Age: 37
DRG: 603 | End: 2022-03-21
Attending: STUDENT IN AN ORGANIZED HEALTH CARE EDUCATION/TRAINING PROGRAM
Payer: COMMERCIAL

## 2022-03-21 PROBLEM — N49.2 SCROTAL ABSCESS: Status: ACTIVE | Noted: 2022-03-16

## 2022-03-21 LAB
BACTERIA BLD CULT: NORMAL
BACTERIA BLD CULT: NORMAL
BASOPHILS # BLD AUTO: 1.3 % (ref 0–1.8)
BASOPHILS # BLD: 0.1 K/UL (ref 0–0.12)
EOSINOPHIL # BLD AUTO: 0.19 K/UL (ref 0–0.51)
EOSINOPHIL NFR BLD: 2.6 % (ref 0–6.9)
ERYTHROCYTE [DISTWIDTH] IN BLOOD BY AUTOMATED COUNT: 38.8 FL (ref 35.9–50)
GLUCOSE BLD STRIP.AUTO-MCNC: 114 MG/DL (ref 65–99)
GLUCOSE BLD STRIP.AUTO-MCNC: 120 MG/DL (ref 65–99)
GLUCOSE BLD STRIP.AUTO-MCNC: 136 MG/DL (ref 65–99)
GLUCOSE BLD STRIP.AUTO-MCNC: 136 MG/DL (ref 65–99)
HCT VFR BLD AUTO: 41 % (ref 42–52)
HGB BLD-MCNC: 13.1 G/DL (ref 14–18)
IMM GRANULOCYTES # BLD AUTO: 0.29 K/UL (ref 0–0.11)
IMM GRANULOCYTES NFR BLD AUTO: 3.9 % (ref 0–0.9)
LYMPHOCYTES # BLD AUTO: 2.36 K/UL (ref 1–4.8)
LYMPHOCYTES NFR BLD: 31.8 % (ref 22–41)
MCH RBC QN AUTO: 27.6 PG (ref 27–33)
MCHC RBC AUTO-ENTMCNC: 32 G/DL (ref 33.7–35.3)
MCV RBC AUTO: 86.5 FL (ref 81.4–97.8)
MONOCYTES # BLD AUTO: 0.47 K/UL (ref 0–0.85)
MONOCYTES NFR BLD AUTO: 6.3 % (ref 0–13.4)
NEUTROPHILS # BLD AUTO: 4.02 K/UL (ref 1.82–7.42)
NEUTROPHILS NFR BLD: 54.1 % (ref 44–72)
NRBC # BLD AUTO: 0 K/UL
NRBC BLD-RTO: 0 /100 WBC
PLATELET # BLD AUTO: 246 K/UL (ref 164–446)
PMV BLD AUTO: 9 FL (ref 9–12.9)
RBC # BLD AUTO: 4.74 M/UL (ref 4.7–6.1)
SIGNIFICANT IND 70042: NORMAL
SIGNIFICANT IND 70042: NORMAL
SITE SITE: NORMAL
SITE SITE: NORMAL
SOURCE SOURCE: NORMAL
SOURCE SOURCE: NORMAL
WBC # BLD AUTO: 7.4 K/UL (ref 4.8–10.8)

## 2022-03-21 PROCEDURE — 85025 COMPLETE CBC W/AUTO DIFF WBC: CPT

## 2022-03-21 PROCEDURE — 700111 HCHG RX REV CODE 636 W/ 250 OVERRIDE (IP): Performed by: STUDENT IN AN ORGANIZED HEALTH CARE EDUCATION/TRAINING PROGRAM

## 2022-03-21 PROCEDURE — 36573 INSJ PICC RS&I 5 YR+: CPT

## 2022-03-21 PROCEDURE — 700102 HCHG RX REV CODE 250 W/ 637 OVERRIDE(OP): Performed by: STUDENT IN AN ORGANIZED HEALTH CARE EDUCATION/TRAINING PROGRAM

## 2022-03-21 PROCEDURE — 94660 CPAP INITIATION&MGMT: CPT

## 2022-03-21 PROCEDURE — 99233 SBSQ HOSP IP/OBS HIGH 50: CPT | Performed by: STUDENT IN AN ORGANIZED HEALTH CARE EDUCATION/TRAINING PROGRAM

## 2022-03-21 PROCEDURE — 36415 COLL VENOUS BLD VENIPUNCTURE: CPT

## 2022-03-21 PROCEDURE — 700105 HCHG RX REV CODE 258: Performed by: INTERNAL MEDICINE

## 2022-03-21 PROCEDURE — 05HY33Z INSERTION OF INFUSION DEVICE INTO UPPER VEIN, PERCUTANEOUS APPROACH: ICD-10-PCS | Performed by: STUDENT IN AN ORGANIZED HEALTH CARE EDUCATION/TRAINING PROGRAM

## 2022-03-21 PROCEDURE — 94760 N-INVAS EAR/PLS OXIMETRY 1: CPT

## 2022-03-21 PROCEDURE — 700111 HCHG RX REV CODE 636 W/ 250 OVERRIDE (IP): Performed by: HOSPITALIST

## 2022-03-21 PROCEDURE — 770006 HCHG ROOM/CARE - MED/SURG/GYN SEMI*

## 2022-03-21 PROCEDURE — 82962 GLUCOSE BLOOD TEST: CPT

## 2022-03-21 PROCEDURE — 700111 HCHG RX REV CODE 636 W/ 250 OVERRIDE (IP): Performed by: INTERNAL MEDICINE

## 2022-03-21 PROCEDURE — A9270 NON-COVERED ITEM OR SERVICE: HCPCS | Performed by: STUDENT IN AN ORGANIZED HEALTH CARE EDUCATION/TRAINING PROGRAM

## 2022-03-21 RX ORDER — KETOROLAC TROMETHAMINE 30 MG/ML
15 INJECTION, SOLUTION INTRAMUSCULAR; INTRAVENOUS EVERY 6 HOURS PRN
Status: DISPENSED | OUTPATIENT
Start: 2022-03-21 | End: 2022-03-22

## 2022-03-21 RX ORDER — NYSTATIN 100000 [USP'U]/G
POWDER TOPICAL 2 TIMES DAILY
Status: DISCONTINUED | OUTPATIENT
Start: 2022-03-21 | End: 2022-03-22 | Stop reason: HOSPADM

## 2022-03-21 RX ADMIN — Medication 1 CAPSULE: at 09:00

## 2022-03-21 RX ADMIN — KETOROLAC TROMETHAMINE 30 MG: 30 INJECTION, SOLUTION INTRAMUSCULAR at 05:33

## 2022-03-21 RX ADMIN — OXYCODONE HYDROCHLORIDE 10 MG: 10 TABLET ORAL at 15:03

## 2022-03-21 RX ADMIN — HYDROMORPHONE HYDROCHLORIDE 1 MG: 1 INJECTION, SOLUTION INTRAMUSCULAR; INTRAVENOUS; SUBCUTANEOUS at 19:43

## 2022-03-21 RX ADMIN — OXYCODONE HYDROCHLORIDE 10 MG: 10 TABLET ORAL at 22:51

## 2022-03-21 RX ADMIN — OXYCODONE HYDROCHLORIDE 10 MG: 10 TABLET ORAL at 18:27

## 2022-03-21 RX ADMIN — NYSTATIN: 100000 POWDER TOPICAL at 21:58

## 2022-03-21 RX ADMIN — HYDROMORPHONE HYDROCHLORIDE 1 MG: 1 INJECTION, SOLUTION INTRAMUSCULAR; INTRAVENOUS; SUBCUTANEOUS at 01:55

## 2022-03-21 RX ADMIN — KETOROLAC TROMETHAMINE 15 MG: 30 INJECTION, SOLUTION INTRAMUSCULAR at 17:33

## 2022-03-21 RX ADMIN — SODIUM CHLORIDE 3 G: 900 INJECTION INTRAVENOUS at 18:28

## 2022-03-21 RX ADMIN — KETOROLAC TROMETHAMINE 30 MG: 30 INJECTION, SOLUTION INTRAMUSCULAR at 00:15

## 2022-03-21 RX ADMIN — HYDROMORPHONE HYDROCHLORIDE 1 MG: 1 INJECTION, SOLUTION INTRAMUSCULAR; INTRAVENOUS; SUBCUTANEOUS at 16:19

## 2022-03-21 RX ADMIN — SODIUM CHLORIDE 3 G: 900 INJECTION INTRAVENOUS at 00:16

## 2022-03-21 RX ADMIN — OXYCODONE HYDROCHLORIDE 10 MG: 10 TABLET ORAL at 05:32

## 2022-03-21 RX ADMIN — OXYCODONE HYDROCHLORIDE 10 MG: 10 TABLET ORAL at 11:45

## 2022-03-21 RX ADMIN — OXYCODONE HYDROCHLORIDE 10 MG: 10 TABLET ORAL at 00:25

## 2022-03-21 RX ADMIN — SODIUM CHLORIDE 3 G: 900 INJECTION INTRAVENOUS at 05:33

## 2022-03-21 RX ADMIN — HYDROMORPHONE HYDROCHLORIDE 1 MG: 1 INJECTION, SOLUTION INTRAMUSCULAR; INTRAVENOUS; SUBCUTANEOUS at 12:48

## 2022-03-21 RX ADMIN — SODIUM CHLORIDE 3 G: 900 INJECTION INTRAVENOUS at 11:45

## 2022-03-21 ASSESSMENT — ENCOUNTER SYMPTOMS
SINUS PAIN: 0
WHEEZING: 0
CHILLS: 0
SORE THROAT: 0
NAUSEA: 0
NERVOUS/ANXIOUS: 0
PALPITATIONS: 0
FOCAL WEAKNESS: 0
ABDOMINAL PAIN: 0
DOUBLE VISION: 0
COUGH: 0
BLURRED VISION: 0
MYALGIAS: 0
SHORTNESS OF BREATH: 0
DIZZINESS: 0
HEADACHES: 0
FEVER: 0
DIARRHEA: 0
VOMITING: 0
SPUTUM PRODUCTION: 0
CONSTIPATION: 0

## 2022-03-21 ASSESSMENT — PAIN DESCRIPTION - PAIN TYPE
TYPE: SURGICAL PAIN
TYPE: SURGICAL PAIN
TYPE: ACUTE PAIN
TYPE: ACUTE PAIN
TYPE: SURGICAL PAIN
TYPE: ACUTE PAIN
TYPE: SURGICAL PAIN
TYPE: ACUTE PAIN
TYPE: ACUTE PAIN
TYPE: SURGICAL PAIN
TYPE: ACUTE PAIN
TYPE: SURGICAL PAIN

## 2022-03-21 NOTE — DISCHARGE PLANNING
Anticipated Discharge Disposition:   Home with [facility]    Action:   Chart review complete     Discussed patient during rounds. Pending PICC placement today. Anticipate possible discharge today vs tomorrow.      RN CM to follow up with ID for [facility].     RN CM will continue to follow.     1515: ANIRUDH WAN spoke to Natalia at [facility].  Per Natalia, if PICC line is placed, patient will go to [facility] @ 1100 tomorrow. If patient is unable to have PICC placed today, patient will have to reschedule.      Barriers to Discharge:   PICC placement       Plan:   HCM will continue to follow and assist with discharge needs.

## 2022-03-21 NOTE — CARE PLAN
The patient is Stable - Low risk of patient condition declining or worsening    Shift Goals  Clinical Goals: Pain control, wound care  Patient Goals: Pain control    Progress made toward(s) clinical / shift goals:  Pain managed with PRN medications.       Problem: Pain - Standard  Goal: Alleviation of pain or a reduction in pain to the patient’s comfort goal  Outcome: Progressing     Problem: Knowledge Deficit - Standard  Goal: Patient and family/care givers will demonstrate understanding of plan of care, disease process/condition, diagnostic tests and medications  Outcome: Progressing     Problem: Diabetes Management  Goal: Patient will achieve and maintain glucose in satisfactory range  Outcome: Progressing       Patient is not progressing towards the following goals:

## 2022-03-21 NOTE — PROGRESS NOTES
"Attempted insertion of picc in LUE x one vein. Patient c/o feeling \"sick\" and lightheadedness. Nausea with retching, no emesis. Procedure aborted. Plan to try again later today.   "

## 2022-03-21 NOTE — PROGRESS NOTES
Infectious Disease Progress Note    Author: Ghislaine Shelley M.D.. Date & Time created: 3/21/2022  10:58 AM    Interval History:  Rx: Unasyn    3/18: Malaise has evaporated since surgery. Had some bleeding in the toilet after surgery last night. Afebrile since admission. WBC 10,600. . Lactic acid 2.5 on admission and down to 1.9. Admission blood cultures negative. CT showed a 1.8 x 5.7 cm abscess in the right perineum tracking to the scrotum prior to surgery. Bilat inguinal adenopathy.  I&D of perineal abscess 3/17 with culture negative to date. Gram stain showed many WBC but no organisms.  3/19: States pain is different. Was not told wound would need packing daily.  Was hoping to wait for wound care team.  Needy.  3/20: Just out of shower.  Wife a bit apprehensive about doing wound care at home.  Pt and wife seem to think home health would do every day.  3/21: Pt in radiology getting PICC.  Review of Systems:  Review of Systems   Unable to perform ROS: Other     Physical Exam:  Physical Exam   Pt not in room, unable to examine.  Labs:  Recent Results (from the past 24 hour(s))   Comp Metabolic Panel    Collection Time: 03/20/22  3:29 PM   Result Value Ref Range    Sodium 137 135 - 145 mmol/L    Potassium 4.0 3.6 - 5.5 mmol/L    Chloride 100 96 - 112 mmol/L    Co2 24 20 - 33 mmol/L    Anion Gap 13.0 7.0 - 16.0    Glucose 151 (H) 65 - 99 mg/dL    Bun 11 8 - 22 mg/dL    Creatinine 0.75 0.50 - 1.40 mg/dL    Calcium 9.4 8.4 - 10.2 mg/dL    AST(SGOT) 59 (H) 12 - 45 U/L    ALT(SGPT) 107 (H) 2 - 50 U/L    Alkaline Phosphatase 93 30 - 99 U/L    Total Bilirubin 0.3 0.1 - 1.5 mg/dL    Albumin 4.1 3.2 - 4.9 g/dL    Total Protein 7.0 6.0 - 8.2 g/dL    Globulin 2.9 1.9 - 3.5 g/dL    A-G Ratio 1.4 g/dL   ESTIMATED GFR    Collection Time: 03/20/22  3:29 PM   Result Value Ref Range    GFR (CKD-EPI) 119 >60 mL/min/1.73 m 2   POCT glucose device results    Collection Time: 03/20/22  4:48 PM   Result Value Ref Range    POC Glucose,  Blood 135 (H) 65 - 99 mg/dL   POCT glucose device results    Collection Time: 03/20/22  8:24 PM   Result Value Ref Range    POC Glucose, Blood 178 (H) 65 - 99 mg/dL   CBC WITH DIFFERENTIAL    Collection Time: 03/21/22  3:52 AM   Result Value Ref Range    WBC 7.4 4.8 - 10.8 K/uL    RBC 4.74 4.70 - 6.10 M/uL    Hemoglobin 13.1 (L) 14.0 - 18.0 g/dL    Hematocrit 41.0 (L) 42.0 - 52.0 %    MCV 86.5 81.4 - 97.8 fL    MCH 27.6 27.0 - 33.0 pg    MCHC 32.0 (L) 33.7 - 35.3 g/dL    RDW 38.8 35.9 - 50.0 fL    Platelet Count 246 164 - 446 K/uL    MPV 9.0 9.0 - 12.9 fL    Neutrophils-Polys 54.10 44.00 - 72.00 %    Lymphocytes 31.80 22.00 - 41.00 %    Monocytes 6.30 0.00 - 13.40 %    Eosinophils 2.60 0.00 - 6.90 %    Basophils 1.30 0.00 - 1.80 %    Immature Granulocytes 3.90 (H) 0.00 - 0.90 %    Nucleated RBC 0.00 /100 WBC    Neutrophils (Absolute) 4.02 1.82 - 7.42 K/uL    Lymphs (Absolute) 2.36 1.00 - 4.80 K/uL    Monos (Absolute) 0.47 0.00 - 0.85 K/uL    Eos (Absolute) 0.19 0.00 - 0.51 K/uL    Baso (Absolute) 0.10 0.00 - 0.12 K/uL    Immature Granulocytes (abs) 0.29 (H) 0.00 - 0.11 K/uL    NRBC (Absolute) 0.00 K/uL   POCT glucose device results    Collection Time: 03/21/22  5:25 AM   Result Value Ref Range    POC Glucose, Blood 114 (H) 65 - 99 mg/dL     Results     Procedure Component Value Units Date/Time    GRAM STAIN [[id number]] Collected: 03/17/22 1609    Order Status: Completed Specimen: Wound Updated: 03/18/22 0805     Significant Indicator .     Source WND     Site Scrotal Fluid     Gram Stain Result Many WBCs.  No organisms seen.      Anaerobic Culture [[id number]] Collected: 03/17/22 1609    Order Status: Completed Specimen: Wound Updated: 03/18/22 0823     Significant Indicator NEG     Source WND     Site Scrotal Fluid     Culture Result Culture in progress.    CULTURE WOUND W/ GRAM STAIN [[id number]] Collected: 03/17/22 1609    Order Status: No result Specimen: Wound Updated: 03/18/22 0823     Significant Indicator NEG      Source WND     Site Scrotal Fluid     Culture Result -     Gram Stain Result Many WBCs.  No organisms seen.      BLOOD CULTURE [[id number]] Collected: 22 1508    Order Status: Completed Specimen: Blood from Peripheral Updated: 22     Significant Indicator NEG     Source BLD     Site PERIPHERAL     Culture Result No Growth    BLOOD CULTURE [[id number]] Collected: 22 1556    Order Status: Completed Specimen: Blood from Peripheral Updated: 22     Significant Indicator NEG     Source BLD     Site PERIPHERAL     Culture Result No Growth      URINALYSIS [[id number]]  (Abnormal) Collected: 22 1543    Order Status: Completed Specimen: Urine, Clean Catch Updated: 22     Color Yellow     Character Clear     Specific Gravity >=1.030     Ph 5.0     Glucose 100 mg/dL      Ketones Negative mg/dL      Protein Negative mg/dL      Bilirubin Negative     Nitrite Negative     Leukocyte Esterase Negative     Occult Blood Negative     Micro Urine Req see below    URINE CULTURE(NEW) [[id number]] Collected: 22    Order Status: Sent Specimen: Urine, Clean Catch Updated: 22     Hemodynamics:  Temp (24hrs), Av.8 °C (98.2 °F), Min:36.5 °C (97.7 °F), Max:37.1 °C (98.7 °F)  Temperature: 36.5 °C (97.7 °F)  Pulse  Av.4  Min: 54  Max: 101   Blood Pressure: 117/73     Peripheral IV 22 20 G Left Forearm (Active)   Site Assessment Clean;Dry;Intact 22 2200      Fluids:  Intake/Output                             22 0700 - 22 0659 22 0700 - 22 0659 22 0700 - 22 0659     [id number] 9987-6533 Total 9567-0245 1919-6729 Total 2394-6917 8871-5403 Total                    Intake    P.O.  --  1000 1000  500  -- 500  120  -- 120    I.V.  --  262.8 262.8  700  -- 700  --  -- --    IV Piggyback  --  200 200  --  -- --  --  -- --    Total Intake -- 1462.8 1462.8 1200 -- 1200 120 -- 120        Medications:  Current Facility-Administered  Medications   Medication Last Admin   • senna-docusate (PERICOLACE or SENOKOT S) 8.6-50 MG per tablet 2 Tablet     • insulin regular (HumuLIN R,NovoLIN R) injection 2 Units at 03/18/22 0612   • piperacillin-tazobactam (ZOSYN) 4.5 g in  mL IVPB 4.5 g at 03/18/22 0607   • hydrALAZINE (APRESOLINE) injection 20 mg     • labetalol (NORMODYNE/TRANDATE) injection 10 mg     • metoclopramide (REGLAN) injection 10 mg       Medical Decision Making, by Problem:  Active Hospital Problems    Diagnosis    • *Perineal abscess [L02.215] Group B Streptococcus    • DM (diabetes mellitus) (HCC) [E11.9]    • Dehydration [E86.0]    • Elevated blood pressure reading [R03.0]    • Lactic acidosis [E87.2]      Plan:  1.  Continue Unasyn  2.  Monitor wound.    Pt potentially was to be discharged today.  Unfortunately, the PICC placement was aborted as pt was lightheaded and nauseated.  They may try later today.    Will see if PICC can be placed.      He is authorized for outpatient IV antibiotics.  He could still be discharged late today if PICC is successfully placed.  He could miss one dose of IV therapy.  I had instructed wife he could receive one dose of oral Augmentin tonight.    If he is discharged, today/tonight, he needs to be in my office tomorrow at 10 am.    Discussed with Dr Cardona    35 min, >50% in face to face contact coordinating care/counseling/ordering PICC

## 2022-03-21 NOTE — PROGRESS NOTES
Received bedside report.  Assumed pt care.   Assessment and chart check complete.  Pt is AA0X4, no signs of distress, denies nausea/vomiting.  Reports pain, medicated per MAR.  1030-Pt is sending to IR imaging for PICC insertion.  1130-back to room. Failed to insert PICC line.  Change of dressing complete.  1625-Dr. Cardona informed thru voalte regarding oxycodone 10 mg is not working per patient.  Dr. Hurley notified thru voalte regarding headache and pain. Toradol given.  Pain medications given every 3 hours.   Fall precautions in place, treaded socks on pt, bed in low position.   Call light within reach.   Educated pt to call if needing anything.   Hourly rounding.

## 2022-03-21 NOTE — CARE PLAN
The patient is Stable - Low risk of patient condition declining or worsening    Shift Goals  Clinical Goals: pain control  Patient Goals: relief from pain  Progress made toward(s) clinical / shift goals:  able to sleep, relief from pain      Problem: Pain - Standard  Goal: Alleviation of pain or a reduction in pain to the patient’s comfort goal  Outcome: Progressing     Problem: Knowledge Deficit - Standard  Goal: Patient and family/care givers will demonstrate understanding of plan of care, disease process/condition, diagnostic tests and medications  Outcome: Progressing     Problem: Wound/ / Incision Healing  Goal: Patient's wound/surgical incision will decrease in size and heals properly  Outcome: Progressing

## 2022-03-21 NOTE — PROGRESS NOTES
Hospital Medicine Daily Progress Note    Date of Service  3/21/2022    Chief Complaint  Connor Jacinto is a 36 y.o. male admitted 3/16/2022 with groin infection since approx 3/4/22    Hospital Course  A 36 y.o. male with a past medical history of diabetes, obesity, recent perineal infection who follows with [facility] infectious disease, Dr. Analisa Hidalgo , who presented 3/16/2022 with worsening perineal pain and swelling.  Patient has been having perineal pain swelling and chills over the past week.  He was hospitalized at [place] and received antibiotics intravenously and was discharged ( AMA ) on orals however his symptoms continue to worsen.  He was seen by his infectious disease doctor on 3/16 and was told to go directly to the hospital for intravenous antibiotics.     Interval Problem Update  Patient was seen and examined at bedside.    Pain control -Multimodal pain control.  BG - controlled   No dysuria and voiding okay.  IV Zosyn switched to IV Unasyn for ID on 3/18  status post incision and drainage right scrotal abscess by urology on 3/17  Wound care - no wound VAC needed, daily packing  Still on IV ABx & ID will decide the final ABx recommendation - PICC order placed by ID.     I have personally seen and examined the patient at bedside. I discussed the plan of care with patient, family, bedside RN, charge RN, , pharmacy and infectious disease.    Consultants/Specialty  general surgery and infectious disease    Code Status  Full Code    Disposition  Patient is not medically cleared for discharge.   Anticipate discharge to TBD.  I have placed the appropriate orders for post-discharge needs.    Review of Systems  Review of Systems   Constitutional: Positive for malaise/fatigue. Negative for chills and fever.   HENT: Negative for congestion, ear discharge, ear pain, sinus pain and sore throat.    Eyes: Negative for blurred vision and double vision.   Respiratory: Negative for cough, sputum  production, shortness of breath and wheezing.    Cardiovascular: Negative for chest pain, palpitations and leg swelling.   Gastrointestinal: Negative for abdominal pain, constipation, diarrhea, nausea and vomiting.   Genitourinary: Negative for dysuria, frequency and urgency.        Scrotal Wound - packing in placed. S/p I & D   Musculoskeletal: Negative for myalgias.   Neurological: Negative for dizziness, focal weakness and headaches.   Psychiatric/Behavioral: The patient is not nervous/anxious.         Physical Exam  Temp:  [36.5 °C (97.7 °F)-37.1 °C (98.7 °F)] 36.5 °C (97.7 °F)  Pulse:  [60-84] 77  Resp:  [18-20] 20  BP: (121-141)/(65-80) 122/73  SpO2:  [92 %-98 %] 96 %    Physical Exam  Constitutional:       General: He is not in acute distress.     Appearance: He is obese.   HENT:      Head: Normocephalic and atraumatic.      Nose: Nose normal.      Mouth/Throat:      Mouth: Mucous membranes are moist.      Pharynx: No posterior oropharyngeal erythema.   Eyes:      General: No scleral icterus.     Extraocular Movements: Extraocular movements intact.      Conjunctiva/sclera: Conjunctivae normal.      Pupils: Pupils are equal, round, and reactive to light.   Cardiovascular:      Rate and Rhythm: Normal rate and regular rhythm.      Pulses: Normal pulses.      Heart sounds: Normal heart sounds. No murmur heard.    No gallop.   Pulmonary:      Effort: Pulmonary effort is normal.      Breath sounds: Normal breath sounds. No stridor. No wheezing, rhonchi or rales.   Abdominal:      General: Bowel sounds are normal.      Palpations: Abdomen is soft.   Genitourinary:     Comments: Scrotal Wound - packing in placed. S/p I & D  Musculoskeletal:         General: No swelling or tenderness.      Cervical back: Normal range of motion and neck supple. No rigidity.   Skin:     General: Skin is warm.   Neurological:      General: No focal deficit present.      Mental Status: He is alert and oriented to person, place, and time.    Psychiatric:         Mood and Affect: Mood normal.         Behavior: Behavior normal.         Fluids    Intake/Output Summary (Last 24 hours) at 3/21/2022 0945  Last data filed at 3/21/2022 0443  Gross per 24 hour   Intake 720 ml   Output --   Net 720 ml       Laboratory  Recent Labs     03/19/22  0146 03/21/22  0352   WBC 8.2 7.4   RBC 5.07 4.74   HEMOGLOBIN 14.3 13.1*   HEMATOCRIT 43.6 41.0*   MCV 86.0 86.5   MCH 28.2 27.6   MCHC 32.8* 32.0*   RDW 38.4 38.8   PLATELETCT 291 246   MPV 8.6* 9.0     Recent Labs     03/19/22  0146 03/20/22  1529   SODIUM 137 137   POTASSIUM 3.8 4.0   CHLORIDE 101 100   CO2 25 24   GLUCOSE 149* 151*   BUN 13 11   CREATININE 0.72 0.75   CALCIUM 9.0 9.4                   Imaging  [id number]   Final Result      1.  No evidence of testicular mass or torsion.      2.  Complex fluid collection involving the low right perineum extending into the inferior scrotal sac measuring 5.6 x 2.9 x 2.5 cm in size possibly representing abscess.      CT-ABDOMEN-PELVIS W/O   Final Result      1.  Inflammatory changes at the right perineum tracking to the right scrotum with 5.7 x 1.8 cm fluid collection, consistent with cellulitis in the correct clinical setting. There is no soft tissue gas to confirm Iveth's gangrene, however Iveth's    gangrene cannot be excluded.   2.  Likely reactive bilateral inguinal lymphadenopathy.      DX-CHEST-PORTABLE (1 VIEW)   Final Result      1.  There is no acute cardiopulmonary process.           Assessment/Plan  * Scrotal abscess- (present on admission)  Assessment & Plan  No crepitus on examination to suggest Iveth's gangrene  Sent by [facility] infectious disease, Dr Analisa Hidalgo for intravenous antibiotics.  Reviewed CT A/P and a scrotal ultrasound.  IV Zosyn switched to IV Unasyn for ID on 3/18  status post incision and drainage right scrotal abscess by urology on 3/17  Wound care - no wound VAC needed, daily packing  Still on IV ABx & ID will decide  the final ABx recommendation    PICC order placed by ID.      Lactic acidosis- (present on admission)  Assessment & Plan  Likely secondary to reduced intravascular volume secondary to dehydration.  Resolved with IVF     Elevated blood pressure reading- (present on admission)  Assessment & Plan  Likely secondary to severe pain.  Multimodal pain control  Consider starting scheduled medications according to trend    DM (diabetes mellitus) (HCC)- (present on admission)  Assessment & Plan  With hyperglycemia  Last glycated hemoglobin was 11.2 %  ISS  Accu-Checks, hypoglycemia protocol         VTE prophylaxis: SCDs/TEDs and enoxaparin ppx     I have performed a physical exam and reviewed and updated ROS and Plan today (3/21/2022). In review of yesterday's note (3/20/2022), there are no changes except as documented above.

## 2022-03-22 VITALS
HEIGHT: 69 IN | SYSTOLIC BLOOD PRESSURE: 116 MMHG | OXYGEN SATURATION: 92 % | BODY MASS INDEX: 46.65 KG/M2 | TEMPERATURE: 97.6 F | RESPIRATION RATE: 20 BRPM | DIASTOLIC BLOOD PRESSURE: 58 MMHG | WEIGHT: 315 LBS | HEART RATE: 60 BPM

## 2022-03-22 LAB
GLUCOSE BLD STRIP.AUTO-MCNC: 137 MG/DL (ref 65–99)
GLUCOSE BLD STRIP.AUTO-MCNC: 139 MG/DL (ref 65–99)

## 2022-03-22 PROCEDURE — 700111 HCHG RX REV CODE 636 W/ 250 OVERRIDE (IP): Performed by: STUDENT IN AN ORGANIZED HEALTH CARE EDUCATION/TRAINING PROGRAM

## 2022-03-22 PROCEDURE — 700111 HCHG RX REV CODE 636 W/ 250 OVERRIDE (IP): Performed by: INTERNAL MEDICINE

## 2022-03-22 PROCEDURE — A9270 NON-COVERED ITEM OR SERVICE: HCPCS | Performed by: INTERNAL MEDICINE

## 2022-03-22 PROCEDURE — 700102 HCHG RX REV CODE 250 W/ 637 OVERRIDE(OP): Performed by: INTERNAL MEDICINE

## 2022-03-22 PROCEDURE — 700102 HCHG RX REV CODE 250 W/ 637 OVERRIDE(OP): Performed by: STUDENT IN AN ORGANIZED HEALTH CARE EDUCATION/TRAINING PROGRAM

## 2022-03-22 PROCEDURE — 700105 HCHG RX REV CODE 258: Performed by: INTERNAL MEDICINE

## 2022-03-22 PROCEDURE — A9270 NON-COVERED ITEM OR SERVICE: HCPCS | Performed by: STUDENT IN AN ORGANIZED HEALTH CARE EDUCATION/TRAINING PROGRAM

## 2022-03-22 PROCEDURE — 94660 CPAP INITIATION&MGMT: CPT

## 2022-03-22 PROCEDURE — 99239 HOSP IP/OBS DSCHRG MGMT >30: CPT | Performed by: INTERNAL MEDICINE

## 2022-03-22 PROCEDURE — 82962 GLUCOSE BLOOD TEST: CPT | Mod: 91

## 2022-03-22 RX ORDER — HYDROCODONE BITARTRATE AND ACETAMINOPHEN 10; 325 MG/1; MG/1
1 TABLET ORAL EVERY 6 HOURS PRN
Status: DISCONTINUED | OUTPATIENT
Start: 2022-03-22 | End: 2022-03-22 | Stop reason: HOSPADM

## 2022-03-22 RX ORDER — AMOXICILLIN AND CLAVULANATE POTASSIUM 562.5; 437.5; 62.5 MG/1; MG/1; MG/1
1 TABLET, MULTILAYER, EXTENDED RELEASE ORAL 2 TIMES DAILY
Qty: 2 TABLET | Refills: 0 | Status: SHIPPED | OUTPATIENT
Start: 2022-03-22 | End: 2022-03-24

## 2022-03-22 RX ORDER — HYDROCODONE BITARTRATE AND ACETAMINOPHEN 5; 325 MG/1; MG/1
2 TABLET ORAL EVERY 4 HOURS PRN
Qty: 30 TABLET | Refills: 0 | Status: SHIPPED | OUTPATIENT
Start: 2022-03-22 | End: 2022-03-27

## 2022-03-22 RX ORDER — OXYCODONE HYDROCHLORIDE 10 MG/1
20 TABLET ORAL
Status: DISCONTINUED | OUTPATIENT
Start: 2022-03-22 | End: 2022-03-22

## 2022-03-22 RX ORDER — OXYCODONE HYDROCHLORIDE 10 MG/1
10 TABLET ORAL EVERY 4 HOURS PRN
Qty: 30 TABLET | Refills: 0 | Status: SHIPPED | OUTPATIENT
Start: 2022-03-22 | End: 2022-03-22

## 2022-03-22 RX ORDER — HYDROMORPHONE HYDROCHLORIDE 1 MG/ML
1 INJECTION, SOLUTION INTRAMUSCULAR; INTRAVENOUS; SUBCUTANEOUS
Status: DISCONTINUED | OUTPATIENT
Start: 2022-03-22 | End: 2022-03-22

## 2022-03-22 RX ORDER — HYDROCODONE BITARTRATE AND ACETAMINOPHEN 10; 325 MG/1; MG/1
1 TABLET ORAL EVERY 6 HOURS PRN
Status: DISCONTINUED | OUTPATIENT
Start: 2022-03-22 | End: 2022-03-22

## 2022-03-22 RX ORDER — OXYCODONE HYDROCHLORIDE 10 MG/1
10 TABLET ORAL
Status: DISCONTINUED | OUTPATIENT
Start: 2022-03-22 | End: 2022-03-22

## 2022-03-22 RX ADMIN — SODIUM CHLORIDE 3 G: 900 INJECTION INTRAVENOUS at 06:09

## 2022-03-22 RX ADMIN — SODIUM CHLORIDE 3 G: 900 INJECTION INTRAVENOUS at 00:08

## 2022-03-22 RX ADMIN — SODIUM CHLORIDE 3 G: 900 INJECTION INTRAVENOUS at 12:33

## 2022-03-22 RX ADMIN — HYDROMORPHONE HYDROCHLORIDE 1 MG: 1 INJECTION, SOLUTION INTRAMUSCULAR; INTRAVENOUS; SUBCUTANEOUS at 00:07

## 2022-03-22 RX ADMIN — HYDROCODONE BITARTRATE AND ACETAMINOPHEN 1 TABLET: 10; 325 TABLET ORAL at 14:34

## 2022-03-22 RX ADMIN — Medication 1 CAPSULE: at 07:57

## 2022-03-22 RX ADMIN — OXYCODONE HYDROCHLORIDE 10 MG: 10 TABLET ORAL at 06:08

## 2022-03-22 RX ADMIN — NYSTATIN: 100000 POWDER TOPICAL at 06:09

## 2022-03-22 RX ADMIN — HYDROMORPHONE HYDROCHLORIDE 1 MG: 1 INJECTION, SOLUTION INTRAMUSCULAR; INTRAVENOUS; SUBCUTANEOUS at 07:52

## 2022-03-22 RX ADMIN — HYDROMORPHONE HYDROCHLORIDE 1 MG: 1 INJECTION, SOLUTION INTRAMUSCULAR; INTRAVENOUS; SUBCUTANEOUS at 12:32

## 2022-03-22 ASSESSMENT — PAIN DESCRIPTION - PAIN TYPE
TYPE: ACUTE PAIN
TYPE: CHRONIC PAIN;ACUTE PAIN

## 2022-03-22 ASSESSMENT — ENCOUNTER SYMPTOMS
FEVER: 0
HEADACHES: 0
CARDIOVASCULAR NEGATIVE: 1
MUSCULOSKELETAL NEGATIVE: 1
RESPIRATORY NEGATIVE: 1

## 2022-03-22 NOTE — CARE PLAN
The patient is Stable - Low risk of patient condition declining or worsening    Shift Goals  Clinical Goals: pain scale will decrease to 3/10 throughout the shift.  Patient Goals: relief from pain    Progress made toward(s) clinical / shift goals:      Patient is not progressing towards the following goals: Given pain medication per MAR. Ice pack applied.  Requiring every 3 hours of  pain medications.      Problem: Pain - Standard  Goal: Alleviation of pain or a reduction in pain to the patient’s comfort goal  Outcome: Not Progressing

## 2022-03-22 NOTE — DISCHARGE SUMMARY
"Discharge Summary    CHIEF COMPLAINT ON ADMISSION  Chief Complaint   Patient presents with   • Groin Pain     Has had a groin infection since approx 3/4/22  Admitted to [place] 3/5 and left AMA due to a \" bad experience \"   On augmentin currently  Followed by ID and told to come to ED         Reason for Admission  infection     Admission Date  3/16/2022    CODE STATUS  Full Code    HPI & HOSPITAL COURSE  This is a 36 y.o. male here with perineal infection. Medical history significant for diabetes, obesity, DONAL. Recently hospitalized at [place] and received antibiotics intravenously and was discharged on orals however his symptoms worsened after discharge.  He was seen by his infectious disease doctor as outpatient and was instructed to present to hospital. No leukocytosis, negative blood culture. CT abdomen pelvis demonstrated inflammatory changes at the right perineum tracking to the right scrotum with 5.7 x 1.8 cm fluid collection, consistent with cellulitis in the correct clinical setting. There is no soft tissue gas to confirm Iveth's gangrene. ID and urology were consulted. Urology performed I&D. Wound culture negative. Infectious disease plan for extended IV antibiotic course. PICC was placed and patient will follow with [facility] for outpatient antibiotic infusions. Home healthcare was ordered for daily wound care. Patient was determined satisfactory for discharge with appropriate follow up.     Therefore, he is discharged in fair and stable condition to home with organized home healthcare and close outpatient follow-up.    The patient met 2-midnight criteria for an inpatient stay at the time of discharge.    Discharge Date  03/22/2022    FOLLOW UP ITEMS POST DISCHARGE  Please follow up with PCP in 3-5 days for post hospitalization follow up and medication reconciliation.     Follow up with [facility] for outpatient antibiotic infusions.     Home health care to perform daily dressing changes. "     DISCHARGE DIAGNOSES  Principal Problem:    Scrotal abscess POA: Yes  Active Problems:    DM (diabetes mellitus) ([facility]) POA: Yes    Elevated blood pressure reading POA: Yes    Lactic acidosis POA: Yes  Resolved Problems:    Dehydration POA: Yes      FOLLOW UP  No future appointments.  [facility]  [address] [facility] [id number]  [place] [id number]  [phone]          MEDICATIONS ON DISCHARGE     Medication List      START taking these medications      Instructions   amoxicillin-clavulanate XR 1000-62.5 MG  Commonly known as: Augmentin XR  Replaces: amoxicillin-clavulanate 500-125 MG Tabs   Take 1 Tablet by mouth 2 times a day for 2 days.  Dose: 1 Tablet     HYDROcodone-acetaminophen 5-325 MG Tabs per tablet  Commonly known as: Norco   Take 2 Tablets by mouth every four hours as needed for up to 5 days.  Dose: 2 Tablet        CONTINUE taking these medications      Instructions   metFORMIN 500 MG Tabs  Commonly known as: GLUCOPHAGE   Take 500 mg by mouth 2 times a day with meals.  Dose: 500 mg        STOP taking these medications    amoxicillin-clavulanate 500-125 MG Tabs  Commonly known as: AUGMENTIN  Replaced by: amoxicillin-clavulanate XR 1000-62.5 MG     clindamycin 300 MG Caps  Commonly known as: CLEOCIN            Allergies  Allergies   Allergen Reactions   • Morphine      Pt reports that its makes his body feel on fire        DIET  Orders Placed This Encounter   Procedures   • Diet Order Diet: Consistent CHO (Diabetic)     Standing Status:   Standing     Number of Occurrences:   1     Order Specific Question:   Diet:     Answer:   Consistent CHO (Diabetic) [4]       ACTIVITY  As tolerated.  Weight bearing as tolerated    CONSULTATIONS  Urology  Infectious disease    PROCEDURES  03/17/2022: Incision and drainage of perineal abscess    LABORATORY  Lab Results   Component Value Date    SODIUM 137 03/20/2022    POTASSIUM 4.0 03/20/2022    CHLORIDE 100 03/20/2022    CO2 24 03/20/2022    GLUCOSE 151 (H)  03/20/2022    BUN 11 03/20/2022    CREATININE 0.75 03/20/2022        Lab Results   Component Value Date    WBC 7.4 03/21/2022    HEMOGLOBIN 13.1 (L) 03/21/2022    HEMATOCRIT 41.0 (L) 03/21/2022    PLATELETCT 246 03/21/2022        Total time of the discharge process exceeds 43 minutes.

## 2022-03-22 NOTE — FACE TO FACE
Face to Face Supporting Documentation - Home Health    The encounter with this patient was in whole or in part the primary reason for home health admission.    Date of encounter:   Patient:                    MRN:                       YOB: 2023  Connor Jacinto  [id number]  1985     Home health to see patient for:  Skilled Nursing care for assessment, interventions & education and Wound Care    Skilled need for:  New Onset Medical Diagnosis Scrotal abscess requiring dressing changes, outpatient antibiotics    Skilled nursing interventions to include:  Venous access care and Wound Care    Homebound status evidenced by:  Needs the assistance of another person in order to leave the home. Leaving home requires a considerable and taxing effort. There is a normal inability to leave the home.    Community Physician to provide follow up care: Yolanda Patel M.D.     Optional Interventions? No      I certify the face to face encounter for this home health care referral meets the CMS requirements and the encounter/clinical assessment with the patient was, in whole, or in part, for the medical condition(s) listed above, which is the primary reason for home health care. Based on my clinical findings: the service(s) are medically necessary, support the need for home health care, and the homebound criteria are met.  I certify that this patient has had a face to face encounter by myself.  Farrukh Jalloh D.O. - NPI: [id number]

## 2022-03-22 NOTE — CARE PLAN
The patient is Stable - Low risk of patient condition declining or worsening    Shift Goals  Clinical Goals: pain control  Patient Goals: sleep and relief from pain    Progress made toward(s) clinical / shift goals:  able to sleep, relief from pain      Problem: Pain - Standard  Goal: Alleviation of pain or a reduction in pain to the patient’s comfort goal  Outcome: Progressing     Problem: Knowledge Deficit - Standard  Goal: Patient and family/care givers will demonstrate understanding of plan of care, disease process/condition, diagnostic tests and medications  Outcome: Progressing     Problem: Physical Regulation  Goal: Signs and symptoms of infection will decrease  Outcome: Progressing     Problem: Wound/ / Incision Healing  Goal: Patient's wound/surgical incision will decrease in size and heals properly  Outcome: Progressing     Problem: Diabetes Management  Goal: Patient will achieve and maintain glucose in satisfactory range  Outcome: Progressing

## 2022-03-22 NOTE — DISCHARGE PLANNING
Anticipated Discharge Disposition:   Home with [facility]    Action:   Chart review complete     Discussed patient during rounds. Per MD, patient must be approved by EMMA prior to d/c.  Referral has been sent to [facility] to follow up.      RN CM to call [facility] after 1300 to ensure patient has a teaching appointment once discharged.     RN CM will continue to follow.     1435: [facility] informed this RN CM that patient has been accepted to [facility].  RN CM informed bedside RN that patient has an appointment with [facility] tomorrow at 10 am.     Barriers to Discharge:   None     Plan:   HCM will continue to follow and assist with discharge needs.

## 2022-03-22 NOTE — CARE PLAN
Problem: Pain - Standard  Goal: Alleviation of pain or a reduction in pain to the patient’s comfort goal  Outcome: Met     Problem: Knowledge Deficit - Standard  Goal: Patient and family/care givers will demonstrate understanding of plan of care, disease process/condition, diagnostic tests and medications  Outcome: Met     Problem: Physical Regulation  Goal: Diagnostic test results will improve  Outcome: Met  Goal: Signs and symptoms of infection will decrease  Outcome: Met     Problem: Pre Op  Goal: Optimal preparation for surgery  Outcome: Met     Problem: Wound/ / Incision Healing  Goal: Patient's wound/surgical incision will decrease in size and heals properly  Outcome: Met     Problem: Diabetes Management  Goal: Patient will achieve and maintain glucose in satisfactory range  Outcome: Met   The patient is Stable - Low risk of patient condition declining or worsening    Shift Goals  Clinical Goals: DC home  Patient Goals: Pain control  Family Goals: pain control    Progress made toward(s) clinical / shift goals:  Yes Pt DC home at 1415, However Per Pt his pain was no controlled with PO meds. Only Dilaudid IV work for him.     Patient is not progressing towards the following goals:

## 2022-03-22 NOTE — DISCHARGE INSTRUCTIONS
AT DISCHARGE, PLEASE TRANSITION PATIENT TO SILVER STRIP PACKING. ADD THE FOLLOWING TO HIS DISCHARGE INSTRUCTIONS. PERINEUM/SCROTAL WOUND: Nursing to remove dressing and packing. Cleanse wound with wound cleanser and gauze. Pat dry. Apply no sting to christal-wound. Using a cotton tip applicator pack wound using one CONTINUOUS piece of SILVER STRIP packing (Ensure a tail is left out of wound for ease of removal as well as to wick away drainage). Secure in place with ABD PAD or dry gauze or feminine sanitary pad and underwear. Change every day and as needed with dislodgment.          Diabetes Mellitus and Nutrition, Adult  When you have diabetes (diabetes mellitus), it is very important to have healthy eating habits because your blood sugar (glucose) levels are greatly affected by what you eat and drink. Eating healthy foods in the appropriate amounts, at about the same times every day, can help you:  · Control your blood glucose.  · Lower your risk of heart disease.  · Improve your blood pressure.  · Reach or maintain a healthy weight.  Every person with diabetes is different, and each person has different needs for a meal plan. Your health care provider may recommend that you work with a diet and nutrition specialist (dietitian) to make a meal plan that is best for you. Your meal plan may vary depending on factors such as:  · The calories you need.  · The medicines you take.  · Your weight.  · Your blood glucose, blood pressure, and cholesterol levels.  · Your activity level.  · Other health conditions you have, such as heart or kidney disease.  How do carbohydrates affect me?  Carbohydrates, also called carbs, affect your blood glucose level more than any other type of food. Eating carbs naturally raises the amount of glucose in your blood. Carb counting is a method for keeping track of how many carbs you eat. Counting carbs is important to keep your blood glucose at a healthy level, especially if you use insulin or  "take certain oral diabetes medicines.  It is important to know how many carbs you can safely have in each meal. This is different for every person. Your dietitian can help you calculate how many carbs you should have at each meal and for each snack.  Foods that contain carbs include:  · Bread, cereal, rice, pasta, and crackers.  · Potatoes and corn.  · Peas, beans, and lentils.  · Milk and yogurt.  · Fruit and juice.  · Desserts, such as cakes, cookies, ice cream, and candy.  How does alcohol affect me?  Alcohol can cause a sudden decrease in blood glucose (hypoglycemia), especially if you use insulin or take certain oral diabetes medicines. Hypoglycemia can be a life-threatening condition. Symptoms of hypoglycemia (sleepiness, dizziness, and confusion) are similar to symptoms of having too much alcohol.  If your health care provider says that alcohol is safe for you, follow these guidelines:  · Limit alcohol intake to no more than 1 drink per day for nonpregnant women and 2 drinks per day for men. One drink equals 12 oz of beer, 5 oz of wine, or 1½ oz of hard liquor.  · Do not drink on an empty stomach.  · Keep yourself hydrated with water, diet soda, or unsweetened iced tea.  · Keep in mind that regular soda, juice, and other mixers may contain a lot of sugar and must be counted as carbs.  What are tips for following this plan?    Reading food labels  · Start by checking the serving size on the \"Nutrition Facts\" label of packaged foods and drinks. The amount of calories, carbs, fats, and other nutrients listed on the label is based on one serving of the item. Many items contain more than one serving per package.  · Check the total grams (g) of carbs in one serving. You can calculate the number of servings of carbs in one serving by dividing the total carbs by 15. For example, if a food has 30 g of total carbs, it would be equal to 2 servings of carbs.  · Check the number of grams (g) of saturated and trans fats in " "one serving. Choose foods that have low or no amount of these fats.  · Check the number of milligrams (mg) of salt (sodium) in one serving. Most people should limit total sodium intake to less than 2,300 mg per day.  · Always check the nutrition information of foods labeled as \"low-fat\" or \"nonfat\". These foods may be higher in added sugar or refined carbs and should be avoided.  · Talk to your dietitian to identify your daily goals for nutrients listed on the label.  Shopping  · Avoid buying canned, premade, or processed foods. These foods tend to be high in fat, sodium, and added sugar.  · Shop around the outside edge of the grocery store. This includes fresh fruits and vegetables, bulk grains, fresh meats, and fresh dairy.  Cooking  · Use low-heat cooking methods, such as baking, instead of high-heat cooking methods like deep frying.  · Cook using healthy oils, such as olive, canola, or sunflower oil.  · Avoid cooking with butter, cream, or high-fat meats.  Meal planning  · Eat meals and snacks regularly, preferably at the same times every day. Avoid going long periods of time without eating.  · Eat foods high in fiber, such as fresh fruits, vegetables, beans, and whole grains. Talk to your dietitian about how many servings of carbs you can eat at each meal.  · Eat 4-6 ounces (oz) of lean protein each day, such as lean meat, chicken, fish, eggs, or tofu. One oz of lean protein is equal to:  ? 1 oz of meat, chicken, or fish.  ? 1 egg.  ? ¼ cup of tofu.  · Eat some foods each day that contain healthy fats, such as avocado, nuts, seeds, and fish.  Lifestyle  · Check your blood glucose regularly.  · Exercise regularly as told by your health care provider. This may include:  ? 150 minutes of moderate-intensity or vigorous-intensity exercise each week. This could be brisk walking, biking, or water aerobics.  ? Stretching and doing strength exercises, such as yoga or weightlifting, at least 2 times a week.  · Take " medicines as told by your health care provider.  · Do not use any products that contain nicotine or tobacco, such as cigarettes and e-cigarettes. If you need help quitting, ask your health care provider.  · Work with a counselor or diabetes educator to identify strategies to manage stress and any emotional and social challenges.  Questions to ask a health care provider  · Do I need to meet with a diabetes educator?  · Do I need to meet with a dietitian?  · What number can I call if I have questions?  · When are the best times to check my blood glucose?  Where to find more information:  · American Diabetes Association: diabetes.org  · Academy of Nutrition and Dietetics: www.eatright.org  · National Webb of Diabetes and Digestive and Kidney Diseases (NIH): www.niddk.nih.gov  Summary  · A healthy meal plan will help you control your blood glucose and maintain a healthy lifestyle.  · Working with a diet and nutrition specialist (dietitian) can help you make a meal plan that is best for you.  · Keep in mind that carbohydrates (carbs) and alcohol have immediate effects on your blood glucose levels. It is important to count carbs and to use alcohol carefully.  This information is not intended to replace advice given to you by your health care provider. Make sure you discuss any questions you have with your health care provider.  Document Released: 09/14/2006 Document Revised: 11/30/2018 Document Reviewed: 01/22/2018  [employer] Patient Education © 2020 [employer]                          Discharge Instructions    Discharged to home by car with relative. Discharged via wheelchair, hospital escort: Yes.  Special equipment needed: Not Applicable    Be sure to schedule a follow-up appointment with your primary care doctor or any specialists as instructed.     Discharge Plan:   Diet Plan: Discussed  Activity Level: Discussed  Confirmed Follow up Appointment: Patient to Call and Schedule Appointment  Confirmed Symptoms  Management: Discussed  Medication Reconciliation Updated: Yes  Influenza Vaccine Indication: Patient Refuses    I understand that a diet low in cholesterol, fat, and sodium is recommended for good health. Unless I have been given specific instructions below for another diet, I accept this instruction as my diet prescription.   Other diet: Consistent Carbohydrates Diabetic Diaet    Special Instructions: None    · Is patient discharged on Warfarin / Coumadin?   No     Depression / Suicide Risk    As you are discharged from this [facility] facility, it is important to learn how to keep safe from harming yourself.    Recognize the warning signs:  · Abrupt changes in personality, positive or negative- including increase in energy   · Giving away possessions  · Change in eating patterns- significant weight changes-  positive or negative  · Change in sleeping patterns- unable to sleep or sleeping all the time   · Unwillingness or inability to communicate  · Depression  · Unusual sadness, discouragement and loneliness  · Talk of wanting to die  · Neglect of personal appearance   · Rebelliousness- reckless behavior  · Withdrawal from people/activities they love  · Confusion- inability to concentrate     If you or a loved one observes any of these behaviors or has concerns about self-harm, here's what you can do:  · Talk about it- your feelings and reasons for harming yourself  · Remove any means that you might use to hurt yourself (examples: pills, rope, extension cords, firearm)  · Get professional help from the community (Mental Health, Substance Abuse, psychological counseling)  · Do not be alone:Call your Safe Contact- someone whom you trust who will be there for you.  · Call your local CRISIS HOTLINE 125-8218 or 821-545-6734  · Call your local Children's Mobile Crisis Response Team [place] (991) 821-6135 or www.[facility]  · Call the toll free National Suicide Prevention Hotlines   · National Suicide  Prevention Lifeline 840-968-OMXU (5075)  · National [place] Line Network 800-SUICIDE (519-0170)

## 2022-03-22 NOTE — PROGRESS NOTES
Vascular Access Team    Date of Insertion: 03/21/2022  Arm Circumference: 40 cm  Internal length: 50 cm  External Length: 0 cm  Vein Occupancy: 26 %  Reason for PICC: ABX > 29 days  Labs within procedure parameters.    Ultrasound images uploaded to PACS and viewable in the EMR - no  Ultrasound images printed and placed in paper chart - yes     BARD Power PICC Lot #: [id number]  Expiration Date: 04/30/2023    Chart reviewed for provider order, indications and contraindications for peripherally inserted central catheter. Labs reviewed and are within procedure parameters. Nursing care plan includes knowledge deficit, potential for pain and anxiety, potential for infection. POC: patient teaching, comfort measures, and sterile technique using five step bundle to prevent CR-BSI. Risks and benefits of procedure explained to patient emphasizing risk of central bloodstream infection. Questions answered. Patient verbalized understanding. Consent signed by patient.     Maximum barrier precautions and aseptic technique used. 1cc of 1% lidocaine injected intradermally to insertion site at RUE. 21 gauge micro-introducer needle used to access Basilic vein. Modified Seldinger technique used to insert 4 fr single lumen 50 cm catheter with brisk blood return noted through each lumen. Each lumen flushed with 10 cc normal saline using pulsatile method without resistance . PICC secured with Statlock at 0 cm allegra. Biopatch and Tegaderm applied over insertion site. 3CG/EKG technology used with appropriate waveform electronically added to chart via [facility].      # of attempts: one      Time out and LDA documentation completed. Patient condition and procedure outcome reported to unit RN and /or ordering provider via this post-procedure note.     Patient educated re: PICC care. Written post-procedure instructions given to patient.

## 2022-03-22 NOTE — PROGRESS NOTES
Report received from night shift RN. Assume care. Pt. AAOx4 pt is bed,  Assessment completed. VSS. C/o  Pain, requesting Dilaudid, states we are forcing to take Oxy 10 in order to get his Dilaudid- only med that works for him. able to wiggle toes and dorsi/plantar flex feet, good CMS and pulses to gloria LE, denies numbness and tingling. Dressing in place DCI, Pt was update for the care for the day. White board updated, All question answered. Pt has call light within reach,  bed is in the lowest position. Pt has no other needs at this time.     1535 After clarifying DC orders with Dr Jalloh. Called Pt's pharmacy to clarified Amoxicillin order only 2 doses one for tonight and one for tomorrow ion the AM. Epi Alonzo took the orders.

## 2022-03-22 NOTE — DISCHARGE PLANNING
Received Choice form at [phone]  [facility] Name[facility]  Referral sent per Choice form @ [phone]  [facility] Name: [facility]  Spoke To:  reynaldo  Outcome: per reynaldo he will call this DPA back     1208  [facility] Name: [facility]  Spoke To: Reynaldo  Outcome: Per reynaldo referral in review and he will have to call DPA back later    0672  [facility] Name: [facility]  Spoke To: Reynaldo  Outcome: Per reynaldo pt accepted

## 2022-03-22 NOTE — PROGRESS NOTES
Infectious Disease Progress Note    Author: Mireille Hidalgo M.D.. Date & Time created: 3/22/2022  1:56 PM    Interval History:  Rx: Unasyn  S/p I&D of perineal abscess, 5 cm cavity on 3/17/22.     3/18: Malaise has evaporated since surgery. Had some bleeding in the toilet after surgery last night. Afebrile since admission. WBC 10,600. . Lactic acid 2.5 on admission and down to 1.9. Admission blood cultures negative. CT showed a 1.8 x 5.7 cm abscess in the right perineum tracking to the scrotum prior to surgery. Bilat inguinal adenopathy.  I&D of perineal abscess 3/17 with culture negative to date. Gram stain showed many WBC but no organisms.  3/19: States pain is different. Was not told wound would need packing daily.  Was hoping to wait for wound care team.  Needy.  3/20: Just out of shower.  Wife a bit apprehensive about doing wound care at home.  Pt and wife seem to think home health would do every day.  3/21: Pt in radiology getting PICC.  3/22: Wound is healing nicely.  He has no complaints today.  Will be discharging later today or tomorrow morning.  Has an appointment in our office tomorrow at 10 AM.    Review of Systems:  Review of Systems   Constitutional: Negative for fever and malaise/fatigue.   Respiratory: Negative.    Cardiovascular: Negative.    Genitourinary: Negative.    Musculoskeletal: Negative.    Skin:        Wound is healing.  Less pain and drainage.   Neurological: Negative for headaches.     Physical Exam:  Physical Exam  Constitutional:       Appearance: He is obese. He is not ill-appearing.   Eyes:      Extraocular Movements: Extraocular movements intact.   Cardiovascular:      Rate and Rhythm: Normal rate and regular rhythm.      Heart sounds: No murmur heard.  Pulmonary:      Effort: Pulmonary effort is normal.      Breath sounds: Normal breath sounds.   Abdominal:      General: Bowel sounds are normal.      Palpations: Abdomen is soft.      Tenderness: There is no abdominal  tenderness.   Genitourinary:     Comments: Scrotal wound is about 3 cm in diameter without surrounding erythema.  There is minimal surrounding tenderness and induration.  Minimal to no purulent drainage.  Neurological:      General: No focal deficit present.      Mental Status: He is alert and oriented to person, place, and time.   Psychiatric:         Mood and Affect: Mood normal.        Labs:  Recent Results (from the past 24 hour(s))   POCT glucose device results    Collection Time: 03/21/22  4:50 PM   Result Value Ref Range    POC Glucose, Blood 136 (H) 65 - 99 mg/dL   POCT glucose device results    Collection Time: 03/21/22 10:07 PM   Result Value Ref Range    POC Glucose, Blood 120 (H) 65 - 99 mg/dL   POCT glucose device results    Collection Time: 03/22/22  6:13 AM   Result Value Ref Range    POC Glucose, Blood 137 (H) 65 - 99 mg/dL   POCT glucose device results    Collection Time: 03/22/22 10:59 AM   Result Value Ref Range    POC Glucose, Blood 139 (H) 65 - 99 mg/dL     Results     Procedure Component Value Units Date/Time    GRAM STAIN [[id number]] Collected: 03/17/22 1609    Order Status: Completed Specimen: Wound Updated: 03/18/22 0824     Significant Indicator .     Source WND     Site Scrotal Fluid     Gram Stain Result Many WBCs.  No organisms seen.      Anaerobic Culture [[id number]] Collected: 03/17/22 1609    Order Status: Completed Specimen: Wound Updated: 03/18/22 0823     Significant Indicator NEG     Source WND     Site Scrotal Fluid     Culture Result Culture in progress.    CULTURE WOUND W/ GRAM STAIN [[id number]] Collected: 03/17/22 1609    Order Status: No result Specimen: Wound Updated: 03/18/22 0823     Significant Indicator NEG     Source WND     Site Scrotal Fluid     Culture Result -     Gram Stain Result Many WBCs.  No organisms seen.      BLOOD CULTURE [[id number]] Collected: 03/16/22 1508    Order Status: Completed Specimen: Blood from Peripheral Updated: 03/17/22 0731     Significant  Indicator NEG     Source BLD     Site PERIPHERAL     Culture Result No Growth    BLOOD CULTURE [[id number]] Collected: 22 1556    Order Status: Completed Specimen: Blood from Peripheral Updated: 22 0731     Significant Indicator NEG     Source BLD     Site PERIPHERAL     Culture Result No Growth      URINALYSIS [[id number]]  (Abnormal) Collected: 22    Order Status: Completed Specimen: Urine, Clean Catch Updated: 22     Color Yellow     Character Clear     Specific Gravity >=1.030     Ph 5.0     Glucose 100 mg/dL      Ketones Negative mg/dL      Protein Negative mg/dL      Bilirubin Negative     Nitrite Negative     Leukocyte Esterase Negative     Occult Blood Negative     Micro Urine Req see below    URINE CULTURE(NEW) [[id number]] Collected: 22    Order Status: Sent Specimen: Urine, Clean Catch Updated: 22     Hemodynamics:  Temp (24hrs), Av.8 °C (98.2 °F), Min:36.4 °C (97.6 °F), Max:36.9 °C (98.5 °F)  Temperature: 36.4 °C (97.6 °F)  Pulse  Av  Min: 54  Max: 101   Blood Pressure: 116/58        Medications:  Scheduled Medications   Medication Dose Frequency   • Pharmacy Consult Request  1 Each PHARMACY TO DOSE   • nystatin   BID   • enoxaparin (LOVENOX) injection  40 mg DAILY   • ampicillin-sulbactam (UNASYN) IV  3 g Q6HRS   • lactobacillus rhamnosus  1 Capsule QDAY with Breakfast   • senna-docusate  2 Tablet BID   • insulin regular  1-6 Units 4X/DAY ACHS       Medical Decision Making, by Problem:  Active Hospital Problems    Diagnosis    • *Perineal abscess [L02.215] Group B Streptococcus    • DM (diabetes mellitus) ([facility]) [E11.9]    • Dehydration [E86.0]    • Elevated blood pressure reading [R03.0]    • Lactic acidosis [E87.2]      Plan:  1.  Continue Unasyn.  Anticipate 2 to 3 weeks of antibiotics after his I&D on 3/17.  He will likely be switched to orals after about 1 week depending on clinical progress.  2.  Monitor wound.  3.  Pending home health  approval for wound care.    PICC was placed on 3/21.    He has been approved for antibiotic infusions with our office.  If he is discharged today, would request to be discharged with at least 2 doses of Augmentin 1 g to be taken every 12 hours until he can see us in office tomorrow 3/23/2022 at 10 AM.    Discussed with case management and Dr. Jalloh.    35 min, >50% in face to face contact coordinating care/counseling/ordering PICC

## 2022-03-24 LAB
BACTERIA SPEC ANAEROBE CULT: NORMAL
BACTERIA WND AEROBE CULT: ABNORMAL
BACTERIA WND AEROBE CULT: ABNORMAL
GRAM STN SPEC: ABNORMAL
SIGNIFICANT IND 70042: ABNORMAL
SIGNIFICANT IND 70042: NORMAL
SITE SITE: ABNORMAL
SITE SITE: NORMAL
SOURCE SOURCE: ABNORMAL
SOURCE SOURCE: NORMAL

## 2022-03-28 ENCOUNTER — HOSPITAL ENCOUNTER (OUTPATIENT)
Facility: MEDICAL CENTER | Age: 37
End: 2022-03-28
Attending: FAMILY MEDICINE
Payer: COMMERCIAL

## 2022-03-28 LAB
ALBUMIN SERPL BCP-MCNC: 4.5 G/DL (ref 3.2–4.9)
ALBUMIN/GLOB SERPL: 1.7 G/DL
ALP SERPL-CCNC: 98 U/L (ref 30–99)
ALT SERPL-CCNC: 117 U/L (ref 2–50)
ANION GAP SERPL CALC-SCNC: 12 MMOL/L (ref 7–16)
AST SERPL-CCNC: 82 U/L (ref 12–45)
BASOPHILS # BLD AUTO: 0.8 % (ref 0–1.8)
BASOPHILS # BLD: 0.07 K/UL (ref 0–0.12)
BILIRUB SERPL-MCNC: 0.5 MG/DL (ref 0.1–1.5)
BUN SERPL-MCNC: 12 MG/DL (ref 8–22)
CALCIUM SERPL-MCNC: 9.1 MG/DL (ref 8.5–10.5)
CHLORIDE SERPL-SCNC: 98 MMOL/L (ref 96–112)
CO2 SERPL-SCNC: 24 MMOL/L (ref 20–33)
CREAT SERPL-MCNC: 0.65 MG/DL (ref 0.5–1.4)
CRP SERPL HS-MCNC: 1.03 MG/DL (ref 0–0.75)
EOSINOPHIL # BLD AUTO: 0.26 K/UL (ref 0–0.51)
EOSINOPHIL NFR BLD: 3.1 % (ref 0–6.9)
ERYTHROCYTE [DISTWIDTH] IN BLOOD BY AUTOMATED COUNT: 38.5 FL (ref 35.9–50)
GFR SERPLBLD CREATININE-BSD FMLA CKD-EPI: 124 ML/MIN/1.73 M 2
GLOBULIN SER CALC-MCNC: 2.7 G/DL (ref 1.9–3.5)
GLUCOSE SERPL-MCNC: 161 MG/DL (ref 65–99)
HCT VFR BLD AUTO: 40 % (ref 42–52)
HGB BLD-MCNC: 13.4 G/DL (ref 14–18)
IMM GRANULOCYTES # BLD AUTO: 0.19 K/UL (ref 0–0.11)
IMM GRANULOCYTES NFR BLD AUTO: 2.3 % (ref 0–0.9)
LYMPHOCYTES # BLD AUTO: 2.09 K/UL (ref 1–4.8)
LYMPHOCYTES NFR BLD: 24.9 % (ref 22–41)
MCH RBC QN AUTO: 28.4 PG (ref 27–33)
MCHC RBC AUTO-ENTMCNC: 33.5 G/DL (ref 33.7–35.3)
MCV RBC AUTO: 84.7 FL (ref 81.4–97.8)
MONOCYTES # BLD AUTO: 0.62 K/UL (ref 0–0.85)
MONOCYTES NFR BLD AUTO: 7.4 % (ref 0–13.4)
NEUTROPHILS # BLD AUTO: 5.17 K/UL (ref 1.82–7.42)
NEUTROPHILS NFR BLD: 61.5 % (ref 44–72)
NRBC # BLD AUTO: 0 K/UL
NRBC BLD-RTO: 0 /100 WBC
PLATELET # BLD AUTO: 256 K/UL (ref 164–446)
PMV BLD AUTO: 9.4 FL (ref 9–12.9)
POTASSIUM SERPL-SCNC: 3.7 MMOL/L (ref 3.6–5.5)
PROT SERPL-MCNC: 7.2 G/DL (ref 6–8.2)
RBC # BLD AUTO: 4.72 M/UL (ref 4.7–6.1)
SODIUM SERPL-SCNC: 134 MMOL/L (ref 135–145)
WBC # BLD AUTO: 8.4 K/UL (ref 4.8–10.8)

## 2022-03-28 PROCEDURE — 86140 C-REACTIVE PROTEIN: CPT

## 2022-03-28 PROCEDURE — 85652 RBC SED RATE AUTOMATED: CPT

## 2022-03-28 PROCEDURE — 80053 COMPREHEN METABOLIC PANEL: CPT

## 2022-03-28 PROCEDURE — 85025 COMPLETE CBC W/AUTO DIFF WBC: CPT

## 2022-03-29 LAB — ERYTHROCYTE [SEDIMENTATION RATE] IN BLOOD BY WESTERGREN METHOD: 11 MM/HOUR (ref 0–20)

## 2022-04-04 ENCOUNTER — HOSPITAL ENCOUNTER (OUTPATIENT)
Facility: MEDICAL CENTER | Age: 37
End: 2022-04-04
Attending: FAMILY MEDICINE
Payer: COMMERCIAL

## 2022-04-04 LAB
ALBUMIN SERPL BCP-MCNC: 4.7 G/DL (ref 3.2–4.9)
ALBUMIN/GLOB SERPL: 1.6 G/DL
ALP SERPL-CCNC: 108 U/L (ref 30–99)
ALT SERPL-CCNC: 103 U/L (ref 2–50)
ANION GAP SERPL CALC-SCNC: 18 MMOL/L (ref 7–16)
AST SERPL-CCNC: 56 U/L (ref 12–45)
BASOPHILS # BLD AUTO: 0.8 % (ref 0–1.8)
BASOPHILS # BLD: 0.06 K/UL (ref 0–0.12)
BILIRUB SERPL-MCNC: 0.4 MG/DL (ref 0.1–1.5)
BUN SERPL-MCNC: 7 MG/DL (ref 8–22)
CALCIUM SERPL-MCNC: 9.3 MG/DL (ref 8.5–10.5)
CHLORIDE SERPL-SCNC: 101 MMOL/L (ref 96–112)
CO2 SERPL-SCNC: 19 MMOL/L (ref 20–33)
CREAT SERPL-MCNC: 0.56 MG/DL (ref 0.5–1.4)
CRP SERPL HS-MCNC: 0.74 MG/DL (ref 0–0.75)
EOSINOPHIL # BLD AUTO: 0.13 K/UL (ref 0–0.51)
EOSINOPHIL NFR BLD: 1.7 % (ref 0–6.9)
ERYTHROCYTE [DISTWIDTH] IN BLOOD BY AUTOMATED COUNT: 39.6 FL (ref 35.9–50)
ERYTHROCYTE [SEDIMENTATION RATE] IN BLOOD BY WESTERGREN METHOD: 16 MM/HOUR (ref 0–20)
GFR SERPLBLD CREATININE-BSD FMLA CKD-EPI: 130 ML/MIN/1.73 M 2
GLOBULIN SER CALC-MCNC: 2.9 G/DL (ref 1.9–3.5)
GLUCOSE SERPL-MCNC: 329 MG/DL (ref 65–99)
HCT VFR BLD AUTO: 42.5 % (ref 42–52)
HGB BLD-MCNC: 14.1 G/DL (ref 14–18)
IMM GRANULOCYTES # BLD AUTO: 0.2 K/UL (ref 0–0.11)
IMM GRANULOCYTES NFR BLD AUTO: 2.7 % (ref 0–0.9)
LYMPHOCYTES # BLD AUTO: 1.84 K/UL (ref 1–4.8)
LYMPHOCYTES NFR BLD: 24.6 % (ref 22–41)
MCH RBC QN AUTO: 28 PG (ref 27–33)
MCHC RBC AUTO-ENTMCNC: 33.2 G/DL (ref 33.7–35.3)
MCV RBC AUTO: 84.3 FL (ref 81.4–97.8)
MONOCYTES # BLD AUTO: 0.58 K/UL (ref 0–0.85)
MONOCYTES NFR BLD AUTO: 7.7 % (ref 0–13.4)
NEUTROPHILS # BLD AUTO: 4.68 K/UL (ref 1.82–7.42)
NEUTROPHILS NFR BLD: 62.5 % (ref 44–72)
NRBC # BLD AUTO: 0 K/UL
NRBC BLD-RTO: 0 /100 WBC
PLATELET # BLD AUTO: 279 K/UL (ref 164–446)
PMV BLD AUTO: 9.4 FL (ref 9–12.9)
POTASSIUM SERPL-SCNC: 3.6 MMOL/L (ref 3.6–5.5)
PROT SERPL-MCNC: 7.6 G/DL (ref 6–8.2)
RBC # BLD AUTO: 5.04 M/UL (ref 4.7–6.1)
SODIUM SERPL-SCNC: 138 MMOL/L (ref 135–145)
WBC # BLD AUTO: 7.5 K/UL (ref 4.8–10.8)

## 2022-04-04 PROCEDURE — 85652 RBC SED RATE AUTOMATED: CPT

## 2022-04-04 PROCEDURE — 80053 COMPREHEN METABOLIC PANEL: CPT

## 2022-04-04 PROCEDURE — 85025 COMPLETE CBC W/AUTO DIFF WBC: CPT

## 2022-04-04 PROCEDURE — 86140 C-REACTIVE PROTEIN: CPT

## 2022-04-21 ENCOUNTER — HOSPITAL ENCOUNTER (OUTPATIENT)
Dept: LAB | Facility: MEDICAL CENTER | Age: 37
End: 2022-04-21
Attending: NURSE PRACTITIONER
Payer: COMMERCIAL

## 2022-04-21 LAB
ALBUMIN SERPL BCP-MCNC: 4.7 G/DL (ref 3.2–4.9)
ALBUMIN/GLOB SERPL: 1.7 G/DL
ALP SERPL-CCNC: 105 U/L (ref 30–99)
ALT SERPL-CCNC: 149 U/L (ref 2–50)
ANION GAP SERPL CALC-SCNC: 14 MMOL/L (ref 7–16)
AST SERPL-CCNC: 78 U/L (ref 12–45)
BASOPHILS # BLD AUTO: 1.1 % (ref 0–1.8)
BASOPHILS # BLD: 0.07 K/UL (ref 0–0.12)
BILIRUB SERPL-MCNC: 0.4 MG/DL (ref 0.1–1.5)
BUN SERPL-MCNC: 9 MG/DL (ref 8–22)
CALCIUM SERPL-MCNC: 9.4 MG/DL (ref 8.5–10.5)
CHLORIDE SERPL-SCNC: 101 MMOL/L (ref 96–112)
CO2 SERPL-SCNC: 22 MMOL/L (ref 20–33)
CREAT SERPL-MCNC: 0.52 MG/DL (ref 0.5–1.4)
CRP SERPL HS-MCNC: 0.75 MG/DL (ref 0–0.75)
EOSINOPHIL # BLD AUTO: 0.13 K/UL (ref 0–0.51)
EOSINOPHIL NFR BLD: 2.1 % (ref 0–6.9)
ERYTHROCYTE [DISTWIDTH] IN BLOOD BY AUTOMATED COUNT: 40.5 FL (ref 35.9–50)
ERYTHROCYTE [SEDIMENTATION RATE] IN BLOOD BY WESTERGREN METHOD: 21 MM/HOUR (ref 0–20)
GFR SERPLBLD CREATININE-BSD FMLA CKD-EPI: 133 ML/MIN/1.73 M 2
GLOBULIN SER CALC-MCNC: 2.8 G/DL (ref 1.9–3.5)
GLUCOSE SERPL-MCNC: 134 MG/DL (ref 65–99)
HCT VFR BLD AUTO: 42.1 % (ref 42–52)
HGB BLD-MCNC: 14.3 G/DL (ref 14–18)
IMM GRANULOCYTES # BLD AUTO: 0.26 K/UL (ref 0–0.11)
IMM GRANULOCYTES NFR BLD AUTO: 4.1 % (ref 0–0.9)
LYMPHOCYTES # BLD AUTO: 1.81 K/UL (ref 1–4.8)
LYMPHOCYTES NFR BLD: 28.8 % (ref 22–41)
MCH RBC QN AUTO: 28.4 PG (ref 27–33)
MCHC RBC AUTO-ENTMCNC: 34 G/DL (ref 33.7–35.3)
MCV RBC AUTO: 83.5 FL (ref 81.4–97.8)
MONOCYTES # BLD AUTO: 0.46 K/UL (ref 0–0.85)
MONOCYTES NFR BLD AUTO: 7.3 % (ref 0–13.4)
NEUTROPHILS # BLD AUTO: 3.56 K/UL (ref 1.82–7.42)
NEUTROPHILS NFR BLD: 56.6 % (ref 44–72)
NRBC # BLD AUTO: 0 K/UL
NRBC BLD-RTO: 0 /100 WBC
PLATELET # BLD AUTO: 281 K/UL (ref 164–446)
PMV BLD AUTO: 8.9 FL (ref 9–12.9)
POTASSIUM SERPL-SCNC: 4.2 MMOL/L (ref 3.6–5.5)
PROT SERPL-MCNC: 7.5 G/DL (ref 6–8.2)
RBC # BLD AUTO: 5.04 M/UL (ref 4.7–6.1)
SODIUM SERPL-SCNC: 137 MMOL/L (ref 135–145)
WBC # BLD AUTO: 6.3 K/UL (ref 4.8–10.8)

## 2022-04-21 PROCEDURE — 85025 COMPLETE CBC W/AUTO DIFF WBC: CPT

## 2022-04-21 PROCEDURE — 36415 COLL VENOUS BLD VENIPUNCTURE: CPT

## 2022-04-21 PROCEDURE — 86140 C-REACTIVE PROTEIN: CPT

## 2022-04-21 PROCEDURE — 85652 RBC SED RATE AUTOMATED: CPT

## 2022-04-21 PROCEDURE — 80053 COMPREHEN METABOLIC PANEL: CPT

## 2022-08-26 ENCOUNTER — APPOINTMENT (OUTPATIENT)
Dept: RADIOLOGY | Facility: MEDICAL CENTER | Age: 37
DRG: 872 | End: 2022-08-26
Attending: EMERGENCY MEDICINE
Payer: COMMERCIAL

## 2022-08-26 ENCOUNTER — HOSPITAL ENCOUNTER (INPATIENT)
Facility: MEDICAL CENTER | Age: 37
LOS: 1 days | DRG: 872 | End: 2022-08-27
Attending: EMERGENCY MEDICINE | Admitting: HOSPITALIST
Payer: COMMERCIAL

## 2022-08-26 DIAGNOSIS — I10 PRIMARY HYPERTENSION: ICD-10-CM

## 2022-08-26 DIAGNOSIS — A41.9 SEPSIS WITHOUT ACUTE ORGAN DYSFUNCTION, DUE TO UNSPECIFIED ORGANISM (HCC): ICD-10-CM

## 2022-08-26 DIAGNOSIS — N12 PYELONEPHRITIS: ICD-10-CM

## 2022-08-26 LAB
ALBUMIN SERPL BCP-MCNC: 4.1 G/DL (ref 3.2–4.9)
ALBUMIN/GLOB SERPL: 1.2 G/DL
ALP SERPL-CCNC: 111 U/L (ref 30–99)
ALT SERPL-CCNC: 101 U/L (ref 2–50)
ANION GAP SERPL CALC-SCNC: 15 MMOL/L (ref 7–16)
APPEARANCE UR: CLEAR
AST SERPL-CCNC: 30 U/L (ref 12–45)
BACTERIA #/AREA URNS HPF: ABNORMAL /HPF
BASOPHILS # BLD AUTO: 0.6 % (ref 0–1.8)
BASOPHILS # BLD: 0.08 K/UL (ref 0–0.12)
BILIRUB SERPL-MCNC: 0.6 MG/DL (ref 0.1–1.5)
BILIRUB UR QL STRIP.AUTO: NEGATIVE
BUN SERPL-MCNC: 15 MG/DL (ref 8–22)
CALCIUM SERPL-MCNC: 9.8 MG/DL (ref 8.4–10.2)
CHLORIDE SERPL-SCNC: 100 MMOL/L (ref 96–112)
CO2 SERPL-SCNC: 21 MMOL/L (ref 20–33)
COLOR UR: YELLOW
CREAT SERPL-MCNC: 0.9 MG/DL (ref 0.5–1.4)
EOSINOPHIL # BLD AUTO: 0.15 K/UL (ref 0–0.51)
EOSINOPHIL NFR BLD: 1.1 % (ref 0–6.9)
EPI CELLS #/AREA URNS HPF: ABNORMAL /HPF
ERYTHROCYTE [DISTWIDTH] IN BLOOD BY AUTOMATED COUNT: 41.1 FL (ref 35.9–50)
GFR SERPLBLD CREATININE-BSD FMLA CKD-EPI: 112 ML/MIN/1.73 M 2
GLOBULIN SER CALC-MCNC: 3.4 G/DL (ref 1.9–3.5)
GLUCOSE BLD STRIP.AUTO-MCNC: 197 MG/DL (ref 65–99)
GLUCOSE BLD STRIP.AUTO-MCNC: 207 MG/DL (ref 65–99)
GLUCOSE SERPL-MCNC: 200 MG/DL (ref 65–99)
GLUCOSE UR STRIP.AUTO-MCNC: NEGATIVE MG/DL
HCT VFR BLD AUTO: 43.2 % (ref 42–52)
HGB BLD-MCNC: 14.7 G/DL (ref 14–18)
IMM GRANULOCYTES # BLD AUTO: 0.19 K/UL (ref 0–0.11)
IMM GRANULOCYTES NFR BLD AUTO: 1.4 % (ref 0–0.9)
KETONES UR STRIP.AUTO-MCNC: NEGATIVE MG/DL
LACTATE BLD-SCNC: 2.3 MMOL/L (ref 0.5–2)
LEUKOCYTE ESTERASE UR QL STRIP.AUTO: ABNORMAL
LYMPHOCYTES # BLD AUTO: 1.85 K/UL (ref 1–4.8)
LYMPHOCYTES NFR BLD: 14 % (ref 22–41)
MCH RBC QN AUTO: 28.7 PG (ref 27–33)
MCHC RBC AUTO-ENTMCNC: 34 G/DL (ref 33.7–35.3)
MCV RBC AUTO: 84.2 FL (ref 81.4–97.8)
MICRO URNS: ABNORMAL
MONOCYTES # BLD AUTO: 1.1 K/UL (ref 0–0.85)
MONOCYTES NFR BLD AUTO: 8.3 % (ref 0–13.4)
MUCOUS THREADS #/AREA URNS HPF: ABNORMAL /HPF
NEUTROPHILS # BLD AUTO: 9.87 K/UL (ref 1.82–7.42)
NEUTROPHILS NFR BLD: 74.6 % (ref 44–72)
NITRITE UR QL STRIP.AUTO: NEGATIVE
NRBC # BLD AUTO: 0 K/UL
NRBC BLD-RTO: 0 /100 WBC
PH UR STRIP.AUTO: 6 [PH] (ref 5–8)
PLATELET # BLD AUTO: 305 K/UL (ref 164–446)
PMV BLD AUTO: 8.6 FL (ref 9–12.9)
POTASSIUM SERPL-SCNC: 3.9 MMOL/L (ref 3.6–5.5)
PROT SERPL-MCNC: 7.5 G/DL (ref 6–8.2)
PROT UR QL STRIP: 100 MG/DL
RBC # BLD AUTO: 5.13 M/UL (ref 4.7–6.1)
RBC # URNS HPF: ABNORMAL /HPF
RBC UR QL AUTO: ABNORMAL
SODIUM SERPL-SCNC: 136 MMOL/L (ref 135–145)
SP GR UR STRIP.AUTO: 1.01
WBC # BLD AUTO: 13.2 K/UL (ref 4.8–10.8)
WBC #/AREA URNS HPF: ABNORMAL /HPF

## 2022-08-26 PROCEDURE — 700111 HCHG RX REV CODE 636 W/ 250 OVERRIDE (IP): Performed by: EMERGENCY MEDICINE

## 2022-08-26 PROCEDURE — 80053 COMPREHEN METABOLIC PANEL: CPT

## 2022-08-26 PROCEDURE — 71045 X-RAY EXAM CHEST 1 VIEW: CPT

## 2022-08-26 PROCEDURE — 36415 COLL VENOUS BLD VENIPUNCTURE: CPT

## 2022-08-26 PROCEDURE — 700102 HCHG RX REV CODE 250 W/ 637 OVERRIDE(OP): Performed by: HOSPITALIST

## 2022-08-26 PROCEDURE — 83605 ASSAY OF LACTIC ACID: CPT

## 2022-08-26 PROCEDURE — 700105 HCHG RX REV CODE 258: Performed by: HOSPITALIST

## 2022-08-26 PROCEDURE — 99223 1ST HOSP IP/OBS HIGH 75: CPT | Performed by: HOSPITALIST

## 2022-08-26 PROCEDURE — 85025 COMPLETE CBC W/AUTO DIFF WBC: CPT

## 2022-08-26 PROCEDURE — 96375 TX/PRO/DX INJ NEW DRUG ADDON: CPT

## 2022-08-26 PROCEDURE — 700102 HCHG RX REV CODE 250 W/ 637 OVERRIDE(OP): Performed by: EMERGENCY MEDICINE

## 2022-08-26 PROCEDURE — 82962 GLUCOSE BLOOD TEST: CPT

## 2022-08-26 PROCEDURE — 87086 URINE CULTURE/COLONY COUNT: CPT

## 2022-08-26 PROCEDURE — 700111 HCHG RX REV CODE 636 W/ 250 OVERRIDE (IP): Performed by: INTERNAL MEDICINE

## 2022-08-26 PROCEDURE — 96374 THER/PROPH/DIAG INJ IV PUSH: CPT

## 2022-08-26 PROCEDURE — A9270 NON-COVERED ITEM OR SERVICE: HCPCS | Performed by: HOSPITALIST

## 2022-08-26 PROCEDURE — 770006 HCHG ROOM/CARE - MED/SURG/GYN SEMI*

## 2022-08-26 PROCEDURE — 99285 EMERGENCY DEPT VISIT HI MDM: CPT

## 2022-08-26 PROCEDURE — 87040 BLOOD CULTURE FOR BACTERIA: CPT

## 2022-08-26 PROCEDURE — 700105 HCHG RX REV CODE 258: Performed by: EMERGENCY MEDICINE

## 2022-08-26 PROCEDURE — A9270 NON-COVERED ITEM OR SERVICE: HCPCS | Performed by: EMERGENCY MEDICINE

## 2022-08-26 PROCEDURE — 81001 URINALYSIS AUTO W/SCOPE: CPT

## 2022-08-26 RX ORDER — SODIUM CHLORIDE, SODIUM LACTATE, POTASSIUM CHLORIDE, AND CALCIUM CHLORIDE .6; .31; .03; .02 G/100ML; G/100ML; G/100ML; G/100ML
30 INJECTION, SOLUTION INTRAVENOUS
Status: DISCONTINUED | OUTPATIENT
Start: 2022-08-26 | End: 2022-08-27 | Stop reason: HOSPADM

## 2022-08-26 RX ORDER — OXYCODONE HYDROCHLORIDE 5 MG/1
5 TABLET ORAL EVERY 4 HOURS PRN
Status: DISCONTINUED | OUTPATIENT
Start: 2022-08-26 | End: 2022-08-27 | Stop reason: HOSPADM

## 2022-08-26 RX ORDER — SODIUM CHLORIDE, SODIUM LACTATE, POTASSIUM CHLORIDE, AND CALCIUM CHLORIDE .6; .31; .03; .02 G/100ML; G/100ML; G/100ML; G/100ML
500 INJECTION, SOLUTION INTRAVENOUS
Status: DISCONTINUED | OUTPATIENT
Start: 2022-08-26 | End: 2022-08-27 | Stop reason: HOSPADM

## 2022-08-26 RX ORDER — SODIUM CHLORIDE 9 MG/ML
INJECTION, SOLUTION INTRAVENOUS CONTINUOUS
Status: DISCONTINUED | OUTPATIENT
Start: 2022-08-26 | End: 2022-08-27 | Stop reason: HOSPADM

## 2022-08-26 RX ORDER — ACETAMINOPHEN 325 MG/1
650 TABLET ORAL EVERY 6 HOURS PRN
Status: DISCONTINUED | OUTPATIENT
Start: 2022-08-26 | End: 2022-08-27 | Stop reason: HOSPADM

## 2022-08-26 RX ORDER — HYDROMORPHONE HYDROCHLORIDE 1 MG/ML
0.5 INJECTION, SOLUTION INTRAMUSCULAR; INTRAVENOUS; SUBCUTANEOUS EVERY 4 HOURS PRN
Status: DISCONTINUED | OUTPATIENT
Start: 2022-08-26 | End: 2022-08-27 | Stop reason: HOSPADM

## 2022-08-26 RX ORDER — ONDANSETRON 2 MG/ML
4 INJECTION INTRAMUSCULAR; INTRAVENOUS ONCE
Status: COMPLETED | OUTPATIENT
Start: 2022-08-26 | End: 2022-08-26

## 2022-08-26 RX ORDER — PROCHLORPERAZINE EDISYLATE 5 MG/ML
5-10 INJECTION INTRAMUSCULAR; INTRAVENOUS EVERY 4 HOURS PRN
Status: DISCONTINUED | OUTPATIENT
Start: 2022-08-26 | End: 2022-08-27 | Stop reason: HOSPADM

## 2022-08-26 RX ORDER — AMOXICILLIN 250 MG
2 CAPSULE ORAL 2 TIMES DAILY
Status: DISCONTINUED | OUTPATIENT
Start: 2022-08-27 | End: 2022-08-27 | Stop reason: HOSPADM

## 2022-08-26 RX ORDER — HYDRALAZINE HYDROCHLORIDE 20 MG/ML
20 INJECTION INTRAMUSCULAR; INTRAVENOUS EVERY 6 HOURS PRN
Status: DISCONTINUED | OUTPATIENT
Start: 2022-08-26 | End: 2022-08-27 | Stop reason: HOSPADM

## 2022-08-26 RX ORDER — ONDANSETRON 2 MG/ML
4 INJECTION INTRAMUSCULAR; INTRAVENOUS EVERY 4 HOURS PRN
Status: DISCONTINUED | OUTPATIENT
Start: 2022-08-26 | End: 2022-08-27 | Stop reason: HOSPADM

## 2022-08-26 RX ORDER — PROMETHAZINE HYDROCHLORIDE 25 MG/1
12.5-25 SUPPOSITORY RECTAL EVERY 4 HOURS PRN
Status: DISCONTINUED | OUTPATIENT
Start: 2022-08-26 | End: 2022-08-27 | Stop reason: HOSPADM

## 2022-08-26 RX ORDER — CEFTRIAXONE 2 G/1
2 INJECTION, POWDER, FOR SOLUTION INTRAMUSCULAR; INTRAVENOUS ONCE
Status: COMPLETED | OUTPATIENT
Start: 2022-08-26 | End: 2022-08-26

## 2022-08-26 RX ORDER — OXYCODONE HYDROCHLORIDE 10 MG/1
10 TABLET ORAL EVERY 4 HOURS PRN
Status: DISCONTINUED | OUTPATIENT
Start: 2022-08-26 | End: 2022-08-27 | Stop reason: HOSPADM

## 2022-08-26 RX ORDER — ONDANSETRON 4 MG/1
4 TABLET, ORALLY DISINTEGRATING ORAL EVERY 4 HOURS PRN
Status: DISCONTINUED | OUTPATIENT
Start: 2022-08-26 | End: 2022-08-27 | Stop reason: HOSPADM

## 2022-08-26 RX ORDER — ACETAMINOPHEN 325 MG/1
650 TABLET ORAL ONCE
Status: COMPLETED | OUTPATIENT
Start: 2022-08-26 | End: 2022-08-26

## 2022-08-26 RX ORDER — PROMETHAZINE HYDROCHLORIDE 25 MG/1
12.5-25 TABLET ORAL EVERY 4 HOURS PRN
Status: DISCONTINUED | OUTPATIENT
Start: 2022-08-26 | End: 2022-08-27 | Stop reason: HOSPADM

## 2022-08-26 RX ORDER — BISACODYL 10 MG
10 SUPPOSITORY, RECTAL RECTAL
Status: DISCONTINUED | OUTPATIENT
Start: 2022-08-26 | End: 2022-08-27 | Stop reason: HOSPADM

## 2022-08-26 RX ORDER — POLYETHYLENE GLYCOL 3350 17 G/17G
1 POWDER, FOR SOLUTION ORAL
Status: DISCONTINUED | OUTPATIENT
Start: 2022-08-26 | End: 2022-08-27 | Stop reason: HOSPADM

## 2022-08-26 RX ORDER — SODIUM CHLORIDE, SODIUM LACTATE, POTASSIUM CHLORIDE, CALCIUM CHLORIDE 600; 310; 30; 20 MG/100ML; MG/100ML; MG/100ML; MG/100ML
1000 INJECTION, SOLUTION INTRAVENOUS ONCE
Status: COMPLETED | OUTPATIENT
Start: 2022-08-26 | End: 2022-08-26

## 2022-08-26 RX ADMIN — SODIUM CHLORIDE: 9 INJECTION, SOLUTION INTRAVENOUS at 22:11

## 2022-08-26 RX ADMIN — ONDANSETRON 4 MG: 2 INJECTION INTRAMUSCULAR; INTRAVENOUS at 20:45

## 2022-08-26 RX ADMIN — HYDROMORPHONE HYDROCHLORIDE 0.5 MG: 1 INJECTION, SOLUTION INTRAMUSCULAR; INTRAVENOUS; SUBCUTANEOUS at 23:25

## 2022-08-26 RX ADMIN — ACETAMINOPHEN 650 MG: 325 TABLET, FILM COATED ORAL at 19:54

## 2022-08-26 RX ADMIN — CEFTRIAXONE SODIUM 2 G: 2 INJECTION, POWDER, FOR SOLUTION INTRAMUSCULAR; INTRAVENOUS at 20:40

## 2022-08-26 RX ADMIN — SODIUM CHLORIDE, POTASSIUM CHLORIDE, SODIUM LACTATE AND CALCIUM CHLORIDE 1000 ML: 600; 310; 30; 20 INJECTION, SOLUTION INTRAVENOUS at 20:48

## 2022-08-26 RX ADMIN — RIVAROXABAN 10 MG: 10 TABLET, FILM COATED ORAL at 23:27

## 2022-08-26 ASSESSMENT — ENCOUNTER SYMPTOMS
VOMITING: 0
FEVER: 1
BRUISES/BLEEDS EASILY: 0
FOCAL WEAKNESS: 0
EYE REDNESS: 0
ABDOMINAL PAIN: 0
MYALGIAS: 0
FLANK PAIN: 1
COUGH: 0
STRIDOR: 0
EYE DISCHARGE: 0
CHILLS: 1
NERVOUS/ANXIOUS: 0
SHORTNESS OF BREATH: 0

## 2022-08-26 ASSESSMENT — PAIN DESCRIPTION - PAIN TYPE: TYPE: ACUTE PAIN

## 2022-08-26 ASSESSMENT — FIBROSIS 4 INDEX: FIB4 SCORE: 0.86

## 2022-08-27 VITALS
SYSTOLIC BLOOD PRESSURE: 157 MMHG | BODY MASS INDEX: 46.65 KG/M2 | RESPIRATION RATE: 16 BRPM | HEIGHT: 69 IN | WEIGHT: 315 LBS | OXYGEN SATURATION: 95 % | TEMPERATURE: 98.9 F | HEART RATE: 68 BPM | DIASTOLIC BLOOD PRESSURE: 109 MMHG

## 2022-08-27 PROBLEM — R03.0 ELEVATED BLOOD PRESSURE READING: Status: RESOLVED | Noted: 2022-03-16 | Resolved: 2022-08-27

## 2022-08-27 PROBLEM — A41.9 SEPSIS (HCC): Status: RESOLVED | Noted: 2022-08-26 | Resolved: 2022-08-27

## 2022-08-27 LAB
ALBUMIN SERPL BCP-MCNC: 4.1 G/DL (ref 3.2–4.9)
ALBUMIN/GLOB SERPL: 1.5 G/DL
ALP SERPL-CCNC: 91 U/L (ref 30–99)
ALT SERPL-CCNC: 81 U/L (ref 2–50)
ANION GAP SERPL CALC-SCNC: 15 MMOL/L (ref 7–16)
AST SERPL-CCNC: 24 U/L (ref 12–45)
BILIRUB SERPL-MCNC: 0.5 MG/DL (ref 0.1–1.5)
BUN SERPL-MCNC: 16 MG/DL (ref 8–22)
CALCIUM SERPL-MCNC: 9 MG/DL (ref 8.4–10.2)
CHLORIDE SERPL-SCNC: 100 MMOL/L (ref 96–112)
CO2 SERPL-SCNC: 24 MMOL/L (ref 20–33)
CREAT SERPL-MCNC: 0.94 MG/DL (ref 0.5–1.4)
ERYTHROCYTE [DISTWIDTH] IN BLOOD BY AUTOMATED COUNT: 42.1 FL (ref 35.9–50)
GFR SERPLBLD CREATININE-BSD FMLA CKD-EPI: 106 ML/MIN/1.73 M 2
GLOBULIN SER CALC-MCNC: 2.7 G/DL (ref 1.9–3.5)
GLUCOSE BLD STRIP.AUTO-MCNC: 164 MG/DL (ref 65–99)
GLUCOSE BLD STRIP.AUTO-MCNC: 169 MG/DL (ref 65–99)
GLUCOSE SERPL-MCNC: 179 MG/DL (ref 65–99)
HCT VFR BLD AUTO: 39.4 % (ref 42–52)
HGB BLD-MCNC: 13.3 G/DL (ref 14–18)
LACTATE BLD-SCNC: 1.7 MMOL/L (ref 0.5–2)
LACTATE BLD-SCNC: 1.8 MMOL/L (ref 0.5–2)
MAGNESIUM SERPL-MCNC: 1.7 MG/DL (ref 1.5–2.5)
MCH RBC QN AUTO: 28.9 PG (ref 27–33)
MCHC RBC AUTO-ENTMCNC: 33.8 G/DL (ref 33.7–35.3)
MCV RBC AUTO: 85.7 FL (ref 81.4–97.8)
PLATELET # BLD AUTO: 258 K/UL (ref 164–446)
PMV BLD AUTO: 8.7 FL (ref 9–12.9)
POTASSIUM SERPL-SCNC: 3.8 MMOL/L (ref 3.6–5.5)
PROCALCITONIN SERPL-MCNC: 0.28 NG/ML
PROT SERPL-MCNC: 6.8 G/DL (ref 6–8.2)
RBC # BLD AUTO: 4.6 M/UL (ref 4.7–6.1)
SODIUM SERPL-SCNC: 139 MMOL/L (ref 135–145)
WBC # BLD AUTO: 10.2 K/UL (ref 4.8–10.8)

## 2022-08-27 PROCEDURE — 700102 HCHG RX REV CODE 250 W/ 637 OVERRIDE(OP): Performed by: HOSPITALIST

## 2022-08-27 PROCEDURE — 700111 HCHG RX REV CODE 636 W/ 250 OVERRIDE (IP): Performed by: INTERNAL MEDICINE

## 2022-08-27 PROCEDURE — 83605 ASSAY OF LACTIC ACID: CPT | Mod: 91

## 2022-08-27 PROCEDURE — A9270 NON-COVERED ITEM OR SERVICE: HCPCS | Performed by: HOSPITALIST

## 2022-08-27 PROCEDURE — 80053 COMPREHEN METABOLIC PANEL: CPT

## 2022-08-27 PROCEDURE — A9270 NON-COVERED ITEM OR SERVICE: HCPCS | Performed by: INTERNAL MEDICINE

## 2022-08-27 PROCEDURE — 36415 COLL VENOUS BLD VENIPUNCTURE: CPT

## 2022-08-27 PROCEDURE — 99239 HOSP IP/OBS DSCHRG MGMT >30: CPT | Performed by: HOSPITALIST

## 2022-08-27 PROCEDURE — 700105 HCHG RX REV CODE 258: Performed by: HOSPITALIST

## 2022-08-27 PROCEDURE — 85027 COMPLETE CBC AUTOMATED: CPT

## 2022-08-27 PROCEDURE — 84145 PROCALCITONIN (PCT): CPT

## 2022-08-27 PROCEDURE — 83735 ASSAY OF MAGNESIUM: CPT

## 2022-08-27 PROCEDURE — 700111 HCHG RX REV CODE 636 W/ 250 OVERRIDE (IP): Performed by: HOSPITALIST

## 2022-08-27 PROCEDURE — 82962 GLUCOSE BLOOD TEST: CPT | Mod: 91

## 2022-08-27 PROCEDURE — 700102 HCHG RX REV CODE 250 W/ 637 OVERRIDE(OP): Performed by: INTERNAL MEDICINE

## 2022-08-27 RX ORDER — LISINOPRIL 5 MG/1
5 TABLET ORAL DAILY
Qty: 30 TABLET | Refills: 1 | Status: SHIPPED | OUTPATIENT
Start: 2022-08-27 | End: 2023-02-04

## 2022-08-27 RX ORDER — LISINOPRIL 5 MG/1
5 TABLET ORAL
Status: DISCONTINUED | OUTPATIENT
Start: 2022-08-27 | End: 2022-08-27 | Stop reason: HOSPADM

## 2022-08-27 RX ORDER — CEFDINIR 300 MG/1
300 CAPSULE ORAL 2 TIMES DAILY
Qty: 10 CAPSULE | Refills: 0 | Status: SHIPPED | OUTPATIENT
Start: 2022-08-28 | End: 2022-09-02

## 2022-08-27 RX ORDER — ONDANSETRON 4 MG/1
4 TABLET, ORALLY DISINTEGRATING ORAL EVERY 8 HOURS PRN
Qty: 10 TABLET | Refills: 1 | Status: SHIPPED | OUTPATIENT
Start: 2022-08-27 | End: 2022-11-13

## 2022-08-27 RX ORDER — OXYCODONE HYDROCHLORIDE 5 MG/1
5-10 TABLET ORAL EVERY 8 HOURS PRN
Qty: 12 TABLET | Refills: 0 | Status: SHIPPED | OUTPATIENT
Start: 2022-08-27 | End: 2022-08-30

## 2022-08-27 RX ADMIN — LISINOPRIL 5 MG: 5 TABLET ORAL at 12:45

## 2022-08-27 RX ADMIN — HYDROMORPHONE HYDROCHLORIDE 0.5 MG: 1 INJECTION, SOLUTION INTRAMUSCULAR; INTRAVENOUS; SUBCUTANEOUS at 03:32

## 2022-08-27 RX ADMIN — OXYCODONE HYDROCHLORIDE 10 MG: 10 TABLET ORAL at 11:21

## 2022-08-27 RX ADMIN — CEFTRIAXONE SODIUM 2 G: 2 INJECTION, POWDER, FOR SOLUTION INTRAMUSCULAR; INTRAVENOUS at 12:42

## 2022-08-27 RX ADMIN — ONDANSETRON 4 MG: 2 INJECTION INTRAMUSCULAR; INTRAVENOUS at 12:42

## 2022-08-27 RX ADMIN — OXYCODONE HYDROCHLORIDE 10 MG: 10 TABLET ORAL at 07:46

## 2022-08-27 RX ADMIN — SENNOSIDES AND DOCUSATE SODIUM 2 TABLET: 50; 8.6 TABLET ORAL at 06:46

## 2022-08-27 ASSESSMENT — PAIN DESCRIPTION - PAIN TYPE
TYPE: ACUTE PAIN

## 2022-08-27 ASSESSMENT — LIFESTYLE VARIABLES
ALCOHOL_USE: NO
TOTAL SCORE: 0
HAVE PEOPLE ANNOYED YOU BY CRITICIZING YOUR DRINKING: NO
HOW MANY TIMES IN THE PAST YEAR HAVE YOU HAD 5 OR MORE DRINKS IN A DAY: 0
EVER HAD A DRINK FIRST THING IN THE MORNING TO STEADY YOUR NERVES TO GET RID OF A HANGOVER: NO
ON A TYPICAL DAY WHEN YOU DRINK ALCOHOL HOW MANY DRINKS DO YOU HAVE: 0
HAVE YOU EVER FELT YOU SHOULD CUT DOWN ON YOUR DRINKING: NO
EVER FELT BAD OR GUILTY ABOUT YOUR DRINKING: NO
CONSUMPTION TOTAL: NEGATIVE
TOTAL SCORE: 0
AVERAGE NUMBER OF DAYS PER WEEK YOU HAVE A DRINK CONTAINING ALCOHOL: 0
TOTAL SCORE: 0

## 2022-08-27 ASSESSMENT — COGNITIVE AND FUNCTIONAL STATUS - GENERAL
DAILY ACTIVITIY SCORE: 24
SUGGESTED CMS G CODE MODIFIER DAILY ACTIVITY: CH
SUGGESTED CMS G CODE MODIFIER MOBILITY: CH
MOBILITY SCORE: 24

## 2022-08-27 ASSESSMENT — PATIENT HEALTH QUESTIONNAIRE - PHQ9
SUM OF ALL RESPONSES TO PHQ9 QUESTIONS 1 AND 2: 0
1. LITTLE INTEREST OR PLEASURE IN DOING THINGS: NOT AT ALL
2. FEELING DOWN, DEPRESSED, IRRITABLE, OR HOPELESS: NOT AT ALL

## 2022-08-27 NOTE — CARE PLAN
The patient is Stable - Low risk of patient condition declining or worsening    Shift Goals  Clinical Goals: Pt will experience adequate pain control rating 5/10 or less after pain medication administration through end of shift  Patient Goals: Pt will be able to rest with minimal interruptions through end of shift    Progress made toward(s) clinical / shift goals:  Patient experienced adequate pain control rating 5/10 or less after pain medication administration throughout shift.  Patient was able to rest with minimal interruptions throughout shift.  Cares were provided at scheduled times and when patient called.    Patient is not progressing towards the following goals:

## 2022-08-27 NOTE — H&P
Hospital Medicine History & Physical Note    Date of Service  8/26/2022    Primary Care Physician  Yeyo Mcmahan M.D.    Consultants  None     Code Status  Full Code    Chief Complaint  Chief Complaint   Patient presents with    Flank Pain     Bilat Onset last night and worsening today    Urinary Frequency     Denies dysuria    Fever     Onset fever /chills today T=Max 99,0    Dizziness     History of Presenting Illness  Connor Jacinto Jr. is a 37 y.o. male with a past medical history of diabetes who presented 8/26/2022 with bilateral flank pain, fevers frequency and dizziness.  Patient reports that he has not been feeling well over the past 2 days.  He has been having progressively worsening bilateral, generalized weakness in addition to urinary frequency.  Denies having cough shortness of breath or lower extremity pain redness or swelling.  Pain is severe rated as 10 out of 10.  Worse with moving and with taking a deep breath.  No relieving factors to the pain other than pain medications.    I discussed the plan of care with emergency department physician, and the patient.    Review of Systems  Review of Systems   Constitutional:  Positive for chills, fever and malaise/fatigue.   Eyes:  Negative for discharge and redness.   Respiratory:  Negative for cough, shortness of breath and stridor.    Cardiovascular:  Negative for chest pain and leg swelling.   Gastrointestinal:  Negative for abdominal pain and vomiting.   Genitourinary:  Positive for flank pain and frequency.   Musculoskeletal:  Negative for myalgias.   Skin: Negative.    Neurological:  Negative for focal weakness.   Endo/Heme/Allergies:  Does not bruise/bleed easily.   Psychiatric/Behavioral:  The patient is not nervous/anxious.      Past Medical History   has a past medical history of Chickenpox, Diabetes (HCC), Obesity, and Sleep apnea.    Surgical History   has a past surgical history that includes knee reconstruction; hernia repair; and pr  incis/drain scrotum/testis,epididym (3/17/2022).     Family History  family history includes Diabetes in his father, maternal grandmother, and mother; Heart Disease in his father; Hypertension in his father; Lung Disease in his father.      Social History   reports that he has quit smoking. His smoking use included cigarettes. His smokeless tobacco use includes chew. He reports current alcohol use. He reports current drug use. Drug: Inhaled.    Allergies  Allergies   Allergen Reactions    Morphine      Pt reports that its makes his body feel on fire      Medications  Prior to Admission Medications   Prescriptions Last Dose Informant Patient Reported? Taking?   metFORMIN (GLUCOPHAGE) 500 MG Tab  Patient's Home Pharmacy Yes No   Sig: Take 500 mg by mouth 2 times a day with meals.      Facility-Administered Medications: None     Physical Exam  Temp:  [37.5 °C (99.5 °F)-38.3 °C (100.9 °F)] 38.3 °C (100.9 °F)  Pulse:  [122-129] 122  Resp:  [20-24] 20  BP: (154-166)/(92-99) 166/99  SpO2:  [93 %] 93 %  Blood Pressure: (!) 166/99   Temperature: (!) 38.3 °C (100.9 °F)   Pulse: (!) 122   Respiration: 20   Pulse Oximetry: 93 %     Physical Exam  Constitutional:       General: He is not in acute distress.     Appearance: He is not ill-appearing or diaphoretic.   HENT:      Head: Atraumatic.      Right Ear: External ear normal.      Left Ear: External ear normal.      Nose: No congestion or rhinorrhea.      Mouth/Throat:      Mouth: Mucous membranes are dry.   Eyes:      General: No scleral icterus.        Right eye: No discharge.         Left eye: No discharge.      Pupils: Pupils are equal, round, and reactive to light.   Cardiovascular:      Rate and Rhythm: Regular rhythm. Tachycardia present.   Pulmonary:      Effort: Pulmonary effort is normal.      Comments: Tachypnea.  Saturating well on room air.  Abdominal:      General: There is no distension.      Tenderness: There is right CVA tenderness and left CVA tenderness.       Comments: Soft abdomen   Musculoskeletal:      Cervical back: Neck supple. No rigidity. No muscular tenderness.      Right lower leg: No edema.      Left lower leg: No edema.   Skin:     General: Skin is dry.      Coloration: Skin is pale. Skin is not jaundiced.   Neurological:      Mental Status: He is alert and oriented to person, place, and time.      Coordination: Coordination normal.   Psychiatric:         Mood and Affect: Mood normal.         Behavior: Behavior normal.     Laboratory:  Recent Labs     08/26/22 1926   WBC 13.2*   RBC 5.13   HEMOGLOBIN 14.7   HEMATOCRIT 43.2   MCV 84.2   MCH 28.7   MCHC 34.0   RDW 41.1   PLATELETCT 305   MPV 8.6*     Recent Labs     08/26/22 1926   SODIUM 136   POTASSIUM 3.9   CHLORIDE 100   CO2 21   GLUCOSE 200*   BUN 15   CREATININE 0.90   CALCIUM 9.8     Recent Labs     08/26/22 1926   ALTSGPT 101*   ASTSGOT 30   ALKPHOSPHAT 111*   TBILIRUBIN 0.6   GLUCOSE 200*         No results for input(s): NTPROBNP in the last 72 hours.      No results for input(s): TROPONINT in the last 72 hours.    Imaging:  DX-CHEST-PORTABLE (1 VIEW)   Final Result         No acute cardiac or pulmonary abnormality is identified.        Assessment/Plan:  Justification for Admission Status  I anticipate this patient will require at least two midnights for appropriate medical management, necessitating inpatient admission because patient has sepsis secondary to pyelonephritis, will require intravenous antibiotics    * Sepsis (HCC)- (present on admission)  Assessment & Plan  This is Sepsis Present on admission  SIRS criteria identified on my evaluation include: Tachycardia, with heart rate greater than 90 BPM, Tachypnea, with respirations greater than 20 per minute and Leukocytosis, with WBC greater than 12,000  Source is pyelonephritis  Sepsis protocol initiated  Fluid resuscitation ordered per protocol  Crystalloid Fluid Administration: Fluid resuscitation ordered per standard protocol - 30 mL/kg per  current or ideal body weight  IV antibiotics as appropriate for source of sepsis  Reassessment: I have reassessed the patient's hemodynamic status     Pyelonephritis- (present on admission)  Assessment & Plan  Started on ceftriaxone in the emergency room, continue for now follow on cultures and sensitivities    DM (diabetes mellitus) (HCC)- (present on admission)  Assessment & Plan  With hyperglycemia  Last glycated hemoglobin was 11.2%!  I will start short acting insulin for now  Accu-Checks, hypoglycemia protocol  Adjust according to blood sugars trend     Elevated blood pressure reading- (present on admission)  Assessment & Plan  This is likely secondary to severe pain.  Multimodal pain control.  We will start as needed hydralazine for extreme hypertension.  Consider starting scheduled blood pressure medications according to blood pressure trend    VTE prophylaxis: SCDs/TEDs and Xarelto 10 mg daily as prophylaxis

## 2022-08-27 NOTE — CARE PLAN
The patient is Stable - Low risk of patient condition declining or worsening    Shift Goals  Clinical Goals: pain to be a 5 or less this shift  Patient Goals: discharge home this shift    Progress made toward(s) clinical / shift goals:    Patient being dischaarged this shift.    Patient is not progressing towards the following goals:

## 2022-08-27 NOTE — PROGRESS NOTES
4 Eyes Skin Assessment Completed by ANIRUDH Alvarez and ANIRUDH Warren.    Head WDL  Ears WDL  Nose WDL  Mouth WDL  Neck WDL  Breast/Chest WDL  Shoulder Blades WDL  Spine WDL  (R) Arm/Elbow/Hand WDL  (L) Arm/Elbow/Hand WDL  Abdomen WDL  Groin WDL  Scrotum/Coccyx/Buttocks WDL  (R) Leg WDL  (L) Leg WDL  (R) Heel/Foot/Toe WDL  (L) Heel/Foot/Toe WDL          Devices In Places Blood Pressure Cuff      Interventions In Place Pressure Redistribution Mattress    Possible Skin Injury No    Pictures Uploaded Into Epic N/A  Wound Consult Placed N/A  RN Wound Prevention Protocol Ordered No

## 2022-08-27 NOTE — ASSESSMENT & PLAN NOTE
This is Sepsis Present on admission  SIRS criteria identified on my evaluation include: Tachycardia, with heart rate greater than 90 BPM, Tachypnea, with respirations greater than 20 per minute and Leukocytosis, with WBC greater than 12,000  Source is pyelonephritis  Sepsis protocol initiated  Fluid resuscitation ordered per protocol  Crystalloid Fluid Administration: Fluid resuscitation ordered per standard protocol - 30 mL/kg per current or ideal body weight  IV antibiotics as appropriate for source of sepsis  Reassessment: I have reassessed the patient's hemodynamic status

## 2022-08-27 NOTE — DISCHARGE SUMMARY
Discharge Summary    CHIEF COMPLAINT ON ADMISSION  Chief Complaint   Patient presents with    Flank Pain     Bilat Onset last night and worsening today    Urinary Frequency     Denies dysuria    Fever     Onset fever /chills today T=Max 99,0    Dizziness     Reason for Admission  Back Pain     Admission Date  8/26/2022    CODE STATUS  Full Code    HPI & HOSPITAL COURSE  This is a 37 y.o. male with a past medical history of diabetes admitted 8/26 with sepsis secondary to pyelonephritis.  Was started empirically on ceftriaxone and received intravenous fluids.  Patient clinically improved and wanted to leave the hospital on 8/27/2022 despite counseling to stay for another day. He understands the risks of his discussion and said will come back to hospital if got worse.  He will be transitioned to oral antibiotics. Patient was found to be having elevated blood pressures, hence was started on scheduled lisinopril with a plan to follow-up with his primary care.      Therefore, he is discharged in fair and stable condition to home with close outpatient follow-up.    The patient recovered much more quickly than anticipated on admission.    Discharge Date  8/27/2022     DISCHARGE DIAGNOSES  Principal Problem (Resolved):    Sepsis (HCC) POA: Yes  Active Problems:    Pyelonephritis POA: Yes    DM (diabetes mellitus) (HCC) POA: Yes  Resolved Problems:    Elevated blood pressure reading POA: Yes    FOLLOW UP  Yeyo Mcmahan M.D.  [id number]  [id number]  [place] [address]  [phone]    Follow up in 3 day(s)    MEDICATIONS ON DISCHARGE     Medication List        START taking these medications        Instructions   cefdinir 300 MG Caps  Start taking on: August 28, 2022  Commonly known as: OMNICEF   Take 1 Capsule by mouth 2 times a day for 5 days.  Dose: 300 mg     lisinopril 5 MG Tabs  Commonly known as: PRINIVIL   Take 1 Tablet by mouth every day.  Dose: 5 mg     ondansetron 4 MG Tbdp  Commonly known as: ZOFRAN ODT   Take  1 Tablet by mouth every 8 hours as needed for Nausea.  Dose: 4 mg            CONTINUE taking these medications        Instructions   metFORMIN 500 MG Tabs  Commonly known as: GLUCOPHAGE   Take 500 mg by mouth 2 times a day with meals.  Dose: 500 mg            Allergies  Allergies   Allergen Reactions    Morphine      Pt reports that its makes his body feel on fire      DIET  Orders Placed This Encounter   Procedures    Diet Order Diet: Consistent CHO (Diabetic)     Standing Status:   Standing     Number of Occurrences:   1     Order Specific Question:   Diet:     Answer:   Consistent CHO (Diabetic) [4]     LABORATORY  Lab Results   Component Value Date    SODIUM 139 08/27/2022    POTASSIUM 3.8 08/27/2022    CHLORIDE 100 08/27/2022    CO2 24 08/27/2022    GLUCOSE 179 (H) 08/27/2022    BUN 16 08/27/2022    CREATININE 0.94 08/27/2022     Lab Results   Component Value Date    WBC 10.2 08/27/2022    HEMOGLOBIN 13.3 (L) 08/27/2022    HEMATOCRIT 39.4 (L) 08/27/2022    PLATELETCT 258 08/27/2022      Total time of the discharge process exceeds 35 minutes.

## 2022-08-27 NOTE — DISCHARGE INSTRUCTIONS
Discharge Instructions per Hardeep Hurley M.D.  Follow-up with primary care provider within 3-5 days.  DIAGNOSIS: Sepsis, Pyelonephritis   Return to ER if you develop any of the following symptoms fever, chills, weakness, difficulty with urination, dizziness, headache.     Discharge Instructions    Discharged to home by car with relative. Discharged via wheelchair, hospital escort: Yes.  Special equipment needed: Not Applicable    Be sure to schedule a follow-up appointment with your primary care doctor or any specialists as instructed.     Discharge Plan:   Diet Plan: Discussed  Activity Level: Discussed  Confirmed Follow up Appointment: Patient to Call and Schedule Appointment  Confirmed Symptoms Management: Discussed  Medication Reconciliation Updated: Yes    I understand that a diet low in cholesterol, fat, and sodium is recommended for good health. Unless I have been given specific instructions below for another diet, I accept this instruction as my diet prescription.   Other diet: regular    Special Instructions: None    -Is this patient being discharged with medication to prevent blood clots?  No    Is patient discharged on Warfarin / Coumadin?   No

## 2022-08-27 NOTE — ASSESSMENT & PLAN NOTE
This is likely secondary to severe pain.  Multimodal pain control.  We will start as needed hydralazine for extreme hypertension.  Consider starting scheduled blood pressure medications according to blood pressure trend

## 2022-08-27 NOTE — ED PROVIDER NOTES
ED Provider Note    CHIEF COMPLAINT  Chief Complaint   Patient presents with    Flank Pain     Bilat Onset last night and worsening today    Urinary Frequency     Denies dysuria    Fever     Onset fever /chills today T=Max 99,0    Dizziness       HPI  Connor Jacinto Jr. is a 37 y.o. male who presents to the emergency room complaining of diffuse low back pain, significant thirst and urinary frequency. Today with additional fever and chills as well as lightheaded dizziness and feeling generally unwell. Past medical history significant for obesity and diabetes. He is on metformin. Does not check his blood sugars. No recent additional illness or known contact with sick persons. Has had slight decreased appetite. No abdominal pain no chest pain or shortness of breath. No testicular pain. No change in bowels.    REVIEW OF SYSTEMS  See HPI for further details. All other systems are negative.     PAST MEDICAL HISTORY   has a past medical history of Chickenpox, Diabetes (HCC), Obesity, and Sleep apnea.    SOCIAL HISTORY  Social History     Tobacco Use    Smoking status: Former     Types: Cigarettes    Smokeless tobacco: Current     Types: Chew    Tobacco comments:     vape   Vaping Use    Vaping Use: Some days    Substances: Nicotine, THC, CBD   Substance and Sexual Activity    Alcohol use: Yes     Comment: 3-4 times amonth    Drug use: Yes     Types: Inhaled     Comment: THC    Sexual activity: Yes     Partners: Female     Comment: , works as   at [employer]       SURGICAL HISTORY   has a past surgical history that includes knee reconstruction; hernia repair; and incis/drain scrotum/testis,epididym (3/17/2022).    CURRENT MEDICATIONS  Home Medications    **Home medications have not yet been reviewed for this encounter**         ALLERGIES  Allergies   Allergen Reactions    Morphine      Pt reports that its makes his body feel on fire        PHYSICAL EXAM  VITAL SIGNS: BP (!) 184/88   Pulse 99   Temp 37.1 °C  "(98.8 °F) (Oral)   Resp (!) 28   Ht 1.753 m (5' 9\")   Wt (!) 162 kg (356 lb 11.3 oz)   SpO2 92%   BMI 52.68 kg/m²  @GINA[[id number]::@   Pulse ox interpretation: I interpret this pulse ox as normal.  Constitutional: Alert in no apparent distress.  HENT: No signs of trauma, Bilateral external ears normal, Nose normal.   Eyes: Pupils are equal and reactive  Neck: Normal range of motion, No tenderness, Supple  Cardiovascular: Regular rate and rhythm, no murmurs.   Thorax & Lungs: Normal breath sounds, No respiratory distress, No wheezing, No chest tenderness.   Abdomen: Bowel sounds normal, Soft, No tenderness, overweight  Skin: Warm, Dry, No erythema, No rash.   Back: No bony tenderness  Musculoskeletal: Good range of motion in all major joints. No tenderness to palpation or major deformities noted.   Neurologic: Alert , Normal motor function, Normal sensory function, No focal deficits noted.   Psychiatric: Affect normal, Judgment normal, Mood normal.       DIAGNOSTIC STUDIES / PROCEDURES    LABS  Results for orders placed or performed during the hospital encounter of 08/26/22   CBC WITH DIFFERENTIAL   Result Value Ref Range    WBC 13.2 (H) 4.8 - 10.8 K/uL    RBC 5.13 4.70 - 6.10 M/uL    Hemoglobin 14.7 14.0 - 18.0 g/dL    Hematocrit 43.2 42.0 - 52.0 %    MCV 84.2 81.4 - 97.8 fL    MCH 28.7 27.0 - 33.0 pg    MCHC 34.0 33.7 - 35.3 g/dL    RDW 41.1 35.9 - 50.0 fL    Platelet Count 305 164 - 446 K/uL    MPV 8.6 (L) 9.0 - 12.9 fL    Neutrophils-Polys 74.60 (H) 44.00 - 72.00 %    Lymphocytes 14.00 (L) 22.00 - 41.00 %    Monocytes 8.30 0.00 - 13.40 %    Eosinophils 1.10 0.00 - 6.90 %    Basophils 0.60 0.00 - 1.80 %    Immature Granulocytes 1.40 (H) 0.00 - 0.90 %    Nucleated RBC 0.00 /100 WBC    Neutrophils (Absolute) 9.87 (H) 1.82 - 7.42 K/uL    Lymphs (Absolute) 1.85 1.00 - 4.80 K/uL    Monos (Absolute) 1.10 (H) 0.00 - 0.85 K/uL    Eos (Absolute) 0.15 0.00 - 0.51 K/uL    Baso (Absolute) 0.08 0.00 - 0.12 K/uL    Immature " Granulocytes (abs) 0.19 (H) 0.00 - 0.11 K/uL    NRBC (Absolute) 0.00 K/uL   COMP METABOLIC PANEL   Result Value Ref Range    Sodium 136 135 - 145 mmol/L    Potassium 3.9 3.6 - 5.5 mmol/L    Chloride 100 96 - 112 mmol/L    Co2 21 20 - 33 mmol/L    Anion Gap 15.0 7.0 - 16.0    Glucose 200 (H) 65 - 99 mg/dL    Bun 15 8 - 22 mg/dL    Creatinine 0.90 0.50 - 1.40 mg/dL    Calcium 9.8 8.4 - 10.2 mg/dL    AST(SGOT) 30 12 - 45 U/L    ALT(SGPT) 101 (H) 2 - 50 U/L    Alkaline Phosphatase 111 (H) 30 - 99 U/L    Total Bilirubin 0.6 0.1 - 1.5 mg/dL    Albumin 4.1 3.2 - 4.9 g/dL    Total Protein 7.5 6.0 - 8.2 g/dL    Globulin 3.4 1.9 - 3.5 g/dL    A-G Ratio 1.2 g/dL   URINALYSIS    Specimen: Urine, Clean Catch   Result Value Ref Range    Color Yellow     Character Clear     Specific Gravity 1.010 <1.035    Ph 6.0 5.0 - 8.0    Glucose Negative Negative mg/dL    Ketones Negative Negative mg/dL    Protein 100 (A) Negative mg/dL    Bilirubin Negative Negative    Nitrite Negative Negative    Leukocyte Esterase Small (A) Negative    Occult Blood Moderate (A) Negative    Micro Urine Req Microscopic    LACTIC ACID   Result Value Ref Range    Lactic Acid 2.3 (H) 0.5 - 2.0 mmol/L   ESTIMATED GFR   Result Value Ref Range    GFR (CKD-EPI) 112 >60 mL/min/1.73 m 2   URINE MICROSCOPIC (W/UA)   Result Value Ref Range    WBC 2-5 (A) /hpf    RBC 1-3 /hpf    Bacteria Rare (A) None /hpf    Epithelial Cells Rare Few /hpf    Mucous Threads Rare /hpf   POCT glucose device results   Result Value Ref Range    POC Glucose, Blood 207 (H) 65 - 99 mg/dL         RADIOLOGY  DX-CHEST-PORTABLE (1 VIEW)   Final Result         No acute cardiac or pulmonary abnormality is identified.              COURSE & MEDICAL DECISION MAKING  Pertinent Labs & Imaging studies reviewed. (See chart for details)    37-year-old male presented to the emergency department with bilateral flank pain and increased thirst. Given his initial increased thirst and increased urination my  suspicion was hyperglycemia given his known diabetes. Fortunately there is no evidence of DKA by do believe clinically that he likely has pyelonephritis given his positive urinalysis and bilateral flank pain. He does not have any unilateral pain or symptoms consistent with kidney stone and therefore CT imaging has not been completed tonight. I have started on IV antibiotics, provided with IV fluids and he will be brought into the hospital service.    FINAL IMPRESSION  1. Pyelonephritis            Electronically signed by: Demario Ramirez M.D., 8/26/2022 7:39 PM

## 2022-08-27 NOTE — PROGRESS NOTES
August 27, 2022     Patient: Connor Jacinto Jr.   YOB: 1985   Date of Visit: 8/26/2022 to August 27, 2022         To Whom It May Concern:     Connor Jacinto was seen and treated at a [facility]. Can return to work/school on 8/30/2022 .     Hardeep Hurley MD         8/27/2022

## 2022-08-27 NOTE — DIETARY
NUTRITION SERVICES: BMI - Pt with BMI >40 (=Body mass index is 52.68 kg/m².), Class III obesity. Weight loss counseling not appropriate in acute care setting. RECOMMEND - If appropriate at DC please refer to outpatient nutrition services for weight management.

## 2022-08-27 NOTE — ED NOTES
Pt to ED today with bilateral flank pain and a fever. No hx of kidney infection or chronic UTIs.  Pt denies burning with urination. Has been having urinary frequency.

## 2022-08-27 NOTE — ASSESSMENT & PLAN NOTE
With hyperglycemia  Last glycated hemoglobin was 11.2%!  I will start short acting insulin for now  Accu-Checks, hypoglycemia protocol  Adjust according to blood sugars trend

## 2022-08-27 NOTE — ASSESSMENT & PLAN NOTE
Started on ceftriaxone in the emergency room, continue for now follow on cultures and sensitivities

## 2022-08-29 LAB
BACTERIA UR CULT: NORMAL
SIGNIFICANT IND 70042: NORMAL
SITE SITE: NORMAL
SOURCE SOURCE: NORMAL

## 2022-08-31 LAB
BACTERIA BLD CULT: NORMAL
BACTERIA BLD CULT: NORMAL
SIGNIFICANT IND 70042: NORMAL
SIGNIFICANT IND 70042: NORMAL
SITE SITE: NORMAL
SITE SITE: NORMAL
SOURCE SOURCE: NORMAL
SOURCE SOURCE: NORMAL

## 2022-10-14 ENCOUNTER — HOSPITAL ENCOUNTER (INPATIENT)
Facility: MEDICAL CENTER | Age: 37
LOS: 2 days | DRG: 871 | End: 2022-10-16
Attending: EMERGENCY MEDICINE | Admitting: INTERNAL MEDICINE
Payer: COMMERCIAL

## 2022-10-14 ENCOUNTER — APPOINTMENT (OUTPATIENT)
Dept: RADIOLOGY | Facility: MEDICAL CENTER | Age: 37
DRG: 871 | End: 2022-10-14
Attending: EMERGENCY MEDICINE
Payer: COMMERCIAL

## 2022-10-14 DIAGNOSIS — R10.9 FLANK PAIN: ICD-10-CM

## 2022-10-14 DIAGNOSIS — A41.9 SEPSIS, DUE TO UNSPECIFIED ORGANISM, UNSPECIFIED WHETHER ACUTE ORGAN DYSFUNCTION PRESENT (HCC): ICD-10-CM

## 2022-10-14 DIAGNOSIS — N12 PYELONEPHRITIS: ICD-10-CM

## 2022-10-14 LAB
ALBUMIN SERPL BCP-MCNC: 4.6 G/DL (ref 3.2–4.9)
ALBUMIN/GLOB SERPL: 1.4 G/DL
ALP SERPL-CCNC: 99 U/L (ref 30–99)
ALT SERPL-CCNC: 169 U/L (ref 2–50)
ANION GAP SERPL CALC-SCNC: 17 MMOL/L (ref 7–16)
APPEARANCE UR: CLEAR
AST SERPL-CCNC: 86 U/L (ref 12–45)
BACTERIA #/AREA URNS HPF: ABNORMAL /HPF
BASOPHILS # BLD AUTO: 0.5 % (ref 0–1.8)
BASOPHILS # BLD: 0.08 K/UL (ref 0–0.12)
BILIRUB SERPL-MCNC: 1 MG/DL (ref 0.1–1.5)
BILIRUB UR QL STRIP.AUTO: NEGATIVE
BUN SERPL-MCNC: 19 MG/DL (ref 8–22)
CALCIUM SERPL-MCNC: 9.2 MG/DL (ref 8.4–10.2)
CHLORIDE SERPL-SCNC: 97 MMOL/L (ref 96–112)
CO2 SERPL-SCNC: 21 MMOL/L (ref 20–33)
COLOR UR: YELLOW
CREAT SERPL-MCNC: 1.02 MG/DL (ref 0.5–1.4)
EOSINOPHIL # BLD AUTO: 0.08 K/UL (ref 0–0.51)
EOSINOPHIL NFR BLD: 0.5 % (ref 0–6.9)
ERYTHROCYTE [DISTWIDTH] IN BLOOD BY AUTOMATED COUNT: 40.4 FL (ref 35.9–50)
GFR SERPLBLD CREATININE-BSD FMLA CKD-EPI: 96 ML/MIN/1.73 M 2
GLOBULIN SER CALC-MCNC: 3.2 G/DL (ref 1.9–3.5)
GLUCOSE SERPL-MCNC: 232 MG/DL (ref 65–99)
GLUCOSE UR STRIP.AUTO-MCNC: 100 MG/DL
HCT VFR BLD AUTO: 42.6 % (ref 42–52)
HGB BLD-MCNC: 14.7 G/DL (ref 14–18)
HYALINE CASTS #/AREA URNS LPF: ABNORMAL /LPF
IMM GRANULOCYTES # BLD AUTO: 0.3 K/UL (ref 0–0.11)
IMM GRANULOCYTES NFR BLD AUTO: 1.9 % (ref 0–0.9)
KETONES UR STRIP.AUTO-MCNC: NEGATIVE MG/DL
LACTATE SERPL-SCNC: 3.3 MMOL/L (ref 0.5–2)
LEUKOCYTE ESTERASE UR QL STRIP.AUTO: ABNORMAL
LYMPHOCYTES # BLD AUTO: 1.13 K/UL (ref 1–4.8)
LYMPHOCYTES NFR BLD: 7 % (ref 22–41)
MCH RBC QN AUTO: 28.9 PG (ref 27–33)
MCHC RBC AUTO-ENTMCNC: 34.5 G/DL (ref 33.7–35.3)
MCV RBC AUTO: 83.7 FL (ref 81.4–97.8)
MICRO URNS: ABNORMAL
MONOCYTES # BLD AUTO: 0.87 K/UL (ref 0–0.85)
MONOCYTES NFR BLD AUTO: 5.4 % (ref 0–13.4)
MUCOUS THREADS #/AREA URNS HPF: ABNORMAL /HPF
NEUTROPHILS # BLD AUTO: 13.64 K/UL (ref 1.82–7.42)
NEUTROPHILS NFR BLD: 84.7 % (ref 44–72)
NITRITE UR QL STRIP.AUTO: NEGATIVE
NRBC # BLD AUTO: 0 K/UL
NRBC BLD-RTO: 0 /100 WBC
PH UR STRIP.AUTO: 6.5 [PH] (ref 5–8)
PLATELET # BLD AUTO: 310 K/UL (ref 164–446)
PMV BLD AUTO: 8.6 FL (ref 9–12.9)
POTASSIUM SERPL-SCNC: 4.2 MMOL/L (ref 3.6–5.5)
PROT SERPL-MCNC: 7.8 G/DL (ref 6–8.2)
PROT UR QL STRIP: 100 MG/DL
RBC # BLD AUTO: 5.09 M/UL (ref 4.7–6.1)
RBC # URNS HPF: ABNORMAL /HPF
RBC UR QL AUTO: ABNORMAL
SODIUM SERPL-SCNC: 135 MMOL/L (ref 135–145)
SP GR UR STRIP.AUTO: 1.01
WBC # BLD AUTO: 16.1 K/UL (ref 4.8–10.8)
WBC #/AREA URNS HPF: ABNORMAL /HPF

## 2022-10-14 PROCEDURE — C9803 HOPD COVID-19 SPEC COLLECT: HCPCS | Performed by: INTERNAL MEDICINE

## 2022-10-14 PROCEDURE — 80053 COMPREHEN METABOLIC PANEL: CPT

## 2022-10-14 PROCEDURE — 700105 HCHG RX REV CODE 258: Performed by: EMERGENCY MEDICINE

## 2022-10-14 PROCEDURE — 85025 COMPLETE CBC W/AUTO DIFF WBC: CPT

## 2022-10-14 PROCEDURE — 96374 THER/PROPH/DIAG INJ IV PUSH: CPT

## 2022-10-14 PROCEDURE — 36415 COLL VENOUS BLD VENIPUNCTURE: CPT

## 2022-10-14 PROCEDURE — 81001 URINALYSIS AUTO W/SCOPE: CPT

## 2022-10-14 PROCEDURE — 99223 1ST HOSP IP/OBS HIGH 75: CPT | Performed by: INTERNAL MEDICINE

## 2022-10-14 PROCEDURE — 700102 HCHG RX REV CODE 250 W/ 637 OVERRIDE(OP): Performed by: EMERGENCY MEDICINE

## 2022-10-14 PROCEDURE — 700111 HCHG RX REV CODE 636 W/ 250 OVERRIDE (IP): Performed by: EMERGENCY MEDICINE

## 2022-10-14 PROCEDURE — 99285 EMERGENCY DEPT VISIT HI MDM: CPT

## 2022-10-14 PROCEDURE — 87086 URINE CULTURE/COLONY COUNT: CPT

## 2022-10-14 PROCEDURE — 96375 TX/PRO/DX INJ NEW DRUG ADDON: CPT

## 2022-10-14 PROCEDURE — 83605 ASSAY OF LACTIC ACID: CPT

## 2022-10-14 PROCEDURE — A9270 NON-COVERED ITEM OR SERVICE: HCPCS | Performed by: EMERGENCY MEDICINE

## 2022-10-14 PROCEDURE — 71045 X-RAY EXAM CHEST 1 VIEW: CPT

## 2022-10-14 PROCEDURE — 85610 PROTHROMBIN TIME: CPT

## 2022-10-14 PROCEDURE — 770006 HCHG ROOM/CARE - MED/SURG/GYN SEMI*

## 2022-10-14 PROCEDURE — 87040 BLOOD CULTURE FOR BACTERIA: CPT | Mod: 91

## 2022-10-14 RX ORDER — SODIUM CHLORIDE, SODIUM LACTATE, POTASSIUM CHLORIDE, AND CALCIUM CHLORIDE .6; .31; .03; .02 G/100ML; G/100ML; G/100ML; G/100ML
30 INJECTION, SOLUTION INTRAVENOUS ONCE
Status: COMPLETED | OUTPATIENT
Start: 2022-10-14 | End: 2022-10-15

## 2022-10-14 RX ORDER — DEXTROSE MONOHYDRATE 25 G/50ML
25 INJECTION, SOLUTION INTRAVENOUS
Status: DISCONTINUED | OUTPATIENT
Start: 2022-10-14 | End: 2022-10-15

## 2022-10-14 RX ORDER — CEFTRIAXONE 2 G/1
2 INJECTION, POWDER, FOR SOLUTION INTRAMUSCULAR; INTRAVENOUS ONCE
Status: COMPLETED | OUTPATIENT
Start: 2022-10-14 | End: 2022-10-14

## 2022-10-14 RX ORDER — ONDANSETRON 4 MG/1
4 TABLET, ORALLY DISINTEGRATING ORAL EVERY 4 HOURS PRN
Status: DISCONTINUED | OUTPATIENT
Start: 2022-10-14 | End: 2022-10-16 | Stop reason: HOSPADM

## 2022-10-14 RX ORDER — LABETALOL HYDROCHLORIDE 5 MG/ML
10 INJECTION, SOLUTION INTRAVENOUS EVERY 4 HOURS PRN
Status: DISCONTINUED | OUTPATIENT
Start: 2022-10-14 | End: 2022-10-16 | Stop reason: HOSPADM

## 2022-10-14 RX ORDER — ACETAMINOPHEN 325 MG/1
650 TABLET ORAL EVERY 6 HOURS PRN
Status: DISCONTINUED | OUTPATIENT
Start: 2022-10-14 | End: 2022-10-15

## 2022-10-14 RX ORDER — SODIUM CHLORIDE, SODIUM LACTATE, POTASSIUM CHLORIDE, AND CALCIUM CHLORIDE .6; .31; .03; .02 G/100ML; G/100ML; G/100ML; G/100ML
30 INJECTION, SOLUTION INTRAVENOUS
Status: DISCONTINUED | OUTPATIENT
Start: 2022-10-14 | End: 2022-10-16 | Stop reason: HOSPADM

## 2022-10-14 RX ORDER — PROCHLORPERAZINE EDISYLATE 5 MG/ML
5-10 INJECTION INTRAMUSCULAR; INTRAVENOUS EVERY 4 HOURS PRN
Status: DISCONTINUED | OUTPATIENT
Start: 2022-10-14 | End: 2022-10-16 | Stop reason: HOSPADM

## 2022-10-14 RX ORDER — PROMETHAZINE HYDROCHLORIDE 25 MG/1
12.5-25 TABLET ORAL EVERY 4 HOURS PRN
Status: DISCONTINUED | OUTPATIENT
Start: 2022-10-14 | End: 2022-10-16 | Stop reason: HOSPADM

## 2022-10-14 RX ORDER — SODIUM CHLORIDE, SODIUM LACTATE, POTASSIUM CHLORIDE, AND CALCIUM CHLORIDE .6; .31; .03; .02 G/100ML; G/100ML; G/100ML; G/100ML
1000 INJECTION, SOLUTION INTRAVENOUS
Status: DISCONTINUED | OUTPATIENT
Start: 2022-10-14 | End: 2022-10-16 | Stop reason: HOSPADM

## 2022-10-14 RX ORDER — SODIUM CHLORIDE 9 MG/ML
INJECTION, SOLUTION INTRAVENOUS CONTINUOUS
Status: DISCONTINUED | OUTPATIENT
Start: 2022-10-15 | End: 2022-10-15

## 2022-10-14 RX ORDER — LISINOPRIL 5 MG/1
5 TABLET ORAL DAILY
Status: DISCONTINUED | OUTPATIENT
Start: 2022-10-15 | End: 2022-10-16 | Stop reason: HOSPADM

## 2022-10-14 RX ORDER — HYDROMORPHONE HYDROCHLORIDE 1 MG/ML
1 INJECTION, SOLUTION INTRAMUSCULAR; INTRAVENOUS; SUBCUTANEOUS ONCE
Status: COMPLETED | OUTPATIENT
Start: 2022-10-14 | End: 2022-10-14

## 2022-10-14 RX ORDER — ONDANSETRON 2 MG/ML
4 INJECTION INTRAMUSCULAR; INTRAVENOUS EVERY 4 HOURS PRN
Status: DISCONTINUED | OUTPATIENT
Start: 2022-10-14 | End: 2022-10-16 | Stop reason: HOSPADM

## 2022-10-14 RX ORDER — ONDANSETRON 2 MG/ML
4 INJECTION INTRAMUSCULAR; INTRAVENOUS ONCE
Status: COMPLETED | OUTPATIENT
Start: 2022-10-14 | End: 2022-10-14

## 2022-10-14 RX ORDER — ACETAMINOPHEN 500 MG
1000 TABLET ORAL ONCE
Status: COMPLETED | OUTPATIENT
Start: 2022-10-14 | End: 2022-10-14

## 2022-10-14 RX ORDER — PROMETHAZINE HYDROCHLORIDE 25 MG/1
12.5-25 SUPPOSITORY RECTAL EVERY 4 HOURS PRN
Status: DISCONTINUED | OUTPATIENT
Start: 2022-10-14 | End: 2022-10-16 | Stop reason: HOSPADM

## 2022-10-14 RX ADMIN — SODIUM CHLORIDE, POTASSIUM CHLORIDE, SODIUM LACTATE AND CALCIUM CHLORIDE 2121 ML: 600; 310; 30; 20 INJECTION, SOLUTION INTRAVENOUS at 22:42

## 2022-10-14 RX ADMIN — ONDANSETRON 4 MG: 2 INJECTION INTRAMUSCULAR; INTRAVENOUS at 22:41

## 2022-10-14 RX ADMIN — HYDROMORPHONE HYDROCHLORIDE 1 MG: 1 INJECTION, SOLUTION INTRAMUSCULAR; INTRAVENOUS; SUBCUTANEOUS at 22:42

## 2022-10-14 RX ADMIN — CEFTRIAXONE SODIUM 2 G: 2 INJECTION, POWDER, FOR SOLUTION INTRAMUSCULAR; INTRAVENOUS at 22:42

## 2022-10-14 RX ADMIN — ACETAMINOPHEN 1000 MG: 500 TABLET ORAL at 23:29

## 2022-10-14 ASSESSMENT — ENCOUNTER SYMPTOMS
SPUTUM PRODUCTION: 0
HEADACHES: 0
TINGLING: 0
COUGH: 0
DIZZINESS: 0
CONSTIPATION: 0
LOSS OF CONSCIOUSNESS: 0
FEVER: 1
PALPITATIONS: 0
CHILLS: 1
FALLS: 0
MYALGIAS: 0
STRIDOR: 0
FLANK PAIN: 1
DEPRESSION: 0
SHORTNESS OF BREATH: 0
WEAKNESS: 0
BACK PAIN: 1
ABDOMINAL PAIN: 0

## 2022-10-14 ASSESSMENT — FIBROSIS 4 INDEX: FIB4 SCORE: 0.39

## 2022-10-15 PROBLEM — R74.01 TRANSAMINITIS: Status: ACTIVE | Noted: 2022-10-15

## 2022-10-15 PROBLEM — E11.65 DIABETES MELLITUS WITH HYPERGLYCEMIA (HCC): Status: ACTIVE | Noted: 2022-03-16

## 2022-10-15 PROBLEM — I10 HYPERTENSION: Status: ACTIVE | Noted: 2022-10-15

## 2022-10-15 PROBLEM — U07.1 COVID-19 VIRUS INFECTION: Status: ACTIVE | Noted: 2022-10-15

## 2022-10-15 LAB
ANION GAP SERPL CALC-SCNC: 18 MMOL/L (ref 7–16)
BASOPHILS # BLD AUTO: 0.6 % (ref 0–1.8)
BASOPHILS # BLD: 0.07 K/UL (ref 0–0.12)
BUN SERPL-MCNC: 20 MG/DL (ref 8–22)
CALCIUM SERPL-MCNC: 8.7 MG/DL (ref 8.4–10.2)
CHLORIDE SERPL-SCNC: 97 MMOL/L (ref 96–112)
CO2 SERPL-SCNC: 21 MMOL/L (ref 20–33)
CREAT SERPL-MCNC: 1.07 MG/DL (ref 0.5–1.4)
CRP SERPL HS-MCNC: 4.91 MG/DL (ref 0–0.75)
D DIMER PPP IA.FEU-MCNC: <0.27 UG/ML (FEU) (ref 0–0.5)
EKG IMPRESSION: NORMAL
EOSINOPHIL # BLD AUTO: 0.06 K/UL (ref 0–0.51)
EOSINOPHIL NFR BLD: 0.5 % (ref 0–6.9)
ERYTHROCYTE [DISTWIDTH] IN BLOOD BY AUTOMATED COUNT: 41.3 FL (ref 35.9–50)
FLUAV RNA SPEC QL NAA+PROBE: NEGATIVE
FLUBV RNA SPEC QL NAA+PROBE: NEGATIVE
GFR SERPLBLD CREATININE-BSD FMLA CKD-EPI: 91 ML/MIN/1.73 M 2
GLUCOSE BLD STRIP.AUTO-MCNC: 207 MG/DL (ref 65–99)
GLUCOSE BLD STRIP.AUTO-MCNC: 207 MG/DL (ref 65–99)
GLUCOSE BLD STRIP.AUTO-MCNC: 228 MG/DL (ref 65–99)
GLUCOSE BLD STRIP.AUTO-MCNC: 251 MG/DL (ref 65–99)
GLUCOSE SERPL-MCNC: 220 MG/DL (ref 65–99)
HCT VFR BLD AUTO: 38.3 % (ref 42–52)
HGB BLD-MCNC: 13.1 G/DL (ref 14–18)
IMM GRANULOCYTES # BLD AUTO: 0.26 K/UL (ref 0–0.11)
IMM GRANULOCYTES NFR BLD AUTO: 2.1 % (ref 0–0.9)
INR PPP: 0.98 (ref 0.87–1.13)
LACTATE SERPL-SCNC: 1.7 MMOL/L (ref 0.5–2)
LACTATE SERPL-SCNC: 2.2 MMOL/L (ref 0.5–2)
LACTATE SERPL-SCNC: 2.6 MMOL/L (ref 0.5–2)
LACTATE SERPL-SCNC: 2.7 MMOL/L (ref 0.5–2)
LACTATE SERPL-SCNC: 2.7 MMOL/L (ref 0.5–2)
LYMPHOCYTES # BLD AUTO: 1.61 K/UL (ref 1–4.8)
LYMPHOCYTES NFR BLD: 13.2 % (ref 22–41)
MCH RBC QN AUTO: 28.9 PG (ref 27–33)
MCHC RBC AUTO-ENTMCNC: 34.2 G/DL (ref 33.7–35.3)
MCV RBC AUTO: 84.4 FL (ref 81.4–97.8)
MONOCYTES # BLD AUTO: 0.9 K/UL (ref 0–0.85)
MONOCYTES NFR BLD AUTO: 7.4 % (ref 0–13.4)
NEUTROPHILS # BLD AUTO: 9.3 K/UL (ref 1.82–7.42)
NEUTROPHILS NFR BLD: 76.2 % (ref 44–72)
NRBC # BLD AUTO: 0 K/UL
NRBC BLD-RTO: 0 /100 WBC
PLATELET # BLD AUTO: 255 K/UL (ref 164–446)
PMV BLD AUTO: 8.7 FL (ref 9–12.9)
POTASSIUM SERPL-SCNC: 3.9 MMOL/L (ref 3.6–5.5)
PROCALCITONIN SERPL-MCNC: 0.43 NG/ML
PROTHROMBIN TIME: 12.6 SEC (ref 12–14.6)
RBC # BLD AUTO: 4.54 M/UL (ref 4.7–6.1)
RSV RNA SPEC QL NAA+PROBE: NEGATIVE
SARS-COV-2 RNA RESP QL NAA+PROBE: DETECTED
SODIUM SERPL-SCNC: 136 MMOL/L (ref 135–145)
SPECIMEN SOURCE: ABNORMAL
WBC # BLD AUTO: 12.2 K/UL (ref 4.8–10.8)

## 2022-10-15 PROCEDURE — 85025 COMPLETE CBC W/AUTO DIFF WBC: CPT

## 2022-10-15 PROCEDURE — 700105 HCHG RX REV CODE 258: Performed by: INTERNAL MEDICINE

## 2022-10-15 PROCEDURE — 82962 GLUCOSE BLOOD TEST: CPT | Mod: 91

## 2022-10-15 PROCEDURE — 83605 ASSAY OF LACTIC ACID: CPT | Mod: 91

## 2022-10-15 PROCEDURE — 700111 HCHG RX REV CODE 636 W/ 250 OVERRIDE (IP): Performed by: INTERNAL MEDICINE

## 2022-10-15 PROCEDURE — 700102 HCHG RX REV CODE 250 W/ 637 OVERRIDE(OP): Performed by: INTERNAL MEDICINE

## 2022-10-15 PROCEDURE — 99233 SBSQ HOSP IP/OBS HIGH 50: CPT | Performed by: INTERNAL MEDICINE

## 2022-10-15 PROCEDURE — 80048 BASIC METABOLIC PNL TOTAL CA: CPT

## 2022-10-15 PROCEDURE — 36415 COLL VENOUS BLD VENIPUNCTURE: CPT

## 2022-10-15 PROCEDURE — 0241U HCHG SARS-COV-2 COVID-19 NFCT DS RESP RNA 4 TRGT MIC: CPT

## 2022-10-15 PROCEDURE — 85379 FIBRIN DEGRADATION QUANT: CPT

## 2022-10-15 PROCEDURE — 86140 C-REACTIVE PROTEIN: CPT

## 2022-10-15 PROCEDURE — A9270 NON-COVERED ITEM OR SERVICE: HCPCS | Performed by: INTERNAL MEDICINE

## 2022-10-15 PROCEDURE — 94760 N-INVAS EAR/PLS OXIMETRY 1: CPT

## 2022-10-15 PROCEDURE — 770006 HCHG ROOM/CARE - MED/SURG/GYN SEMI*

## 2022-10-15 PROCEDURE — 93010 ELECTROCARDIOGRAM REPORT: CPT | Performed by: INTERNAL MEDICINE

## 2022-10-15 PROCEDURE — 84145 PROCALCITONIN (PCT): CPT

## 2022-10-15 PROCEDURE — 93005 ELECTROCARDIOGRAM TRACING: CPT | Performed by: INTERNAL MEDICINE

## 2022-10-15 PROCEDURE — 8E0ZXY6 ISOLATION: ICD-10-PCS | Performed by: INTERNAL MEDICINE

## 2022-10-15 PROCEDURE — 96372 THER/PROPH/DIAG INJ SC/IM: CPT

## 2022-10-15 RX ORDER — MORPHINE SULFATE 4 MG/ML
4 INJECTION INTRAVENOUS
Status: DISCONTINUED | OUTPATIENT
Start: 2022-10-15 | End: 2022-10-15

## 2022-10-15 RX ORDER — HYDROMORPHONE HYDROCHLORIDE 2 MG/ML
2 INJECTION, SOLUTION INTRAMUSCULAR; INTRAVENOUS; SUBCUTANEOUS
Status: DISCONTINUED | OUTPATIENT
Start: 2022-10-15 | End: 2022-10-16 | Stop reason: HOSPADM

## 2022-10-15 RX ORDER — OXYCODONE HYDROCHLORIDE 5 MG/1
5 TABLET ORAL EVERY 4 HOURS PRN
Status: DISCONTINUED | OUTPATIENT
Start: 2022-10-15 | End: 2022-10-15

## 2022-10-15 RX ORDER — OXYCODONE HYDROCHLORIDE 5 MG/1
5 TABLET ORAL
Status: DISCONTINUED | OUTPATIENT
Start: 2022-10-15 | End: 2022-10-16 | Stop reason: HOSPADM

## 2022-10-15 RX ORDER — OXYCODONE HYDROCHLORIDE 10 MG/1
10 TABLET ORAL
Status: DISCONTINUED | OUTPATIENT
Start: 2022-10-15 | End: 2022-10-16 | Stop reason: HOSPADM

## 2022-10-15 RX ORDER — DEXTROSE MONOHYDRATE 25 G/50ML
25 INJECTION, SOLUTION INTRAVENOUS
Status: DISCONTINUED | OUTPATIENT
Start: 2022-10-15 | End: 2022-10-16 | Stop reason: HOSPADM

## 2022-10-15 RX ORDER — ACETAMINOPHEN 500 MG
1000 TABLET ORAL EVERY 6 HOURS
Status: DISCONTINUED | OUTPATIENT
Start: 2022-10-15 | End: 2022-10-16 | Stop reason: HOSPADM

## 2022-10-15 RX ORDER — ATORVASTATIN CALCIUM 20 MG/1
20 TABLET, FILM COATED ORAL NIGHTLY
COMMUNITY

## 2022-10-15 RX ORDER — ACETAMINOPHEN 500 MG
1000 TABLET ORAL EVERY 6 HOURS PRN
Status: DISCONTINUED | OUTPATIENT
Start: 2022-10-20 | End: 2022-10-16 | Stop reason: HOSPADM

## 2022-10-15 RX ADMIN — INSULIN HUMAN 3 UNITS: 100 INJECTION, SOLUTION PARENTERAL at 17:31

## 2022-10-15 RX ADMIN — INSULIN HUMAN 5 UNITS: 100 INJECTION, SOLUTION PARENTERAL at 10:25

## 2022-10-15 RX ADMIN — SODIUM CHLORIDE: 9 INJECTION, SOLUTION INTRAVENOUS at 01:04

## 2022-10-15 RX ADMIN — OXYCODONE HYDROCHLORIDE 10 MG: 10 TABLET ORAL at 12:11

## 2022-10-15 RX ADMIN — RIVAROXABAN 10 MG: 10 TABLET, FILM COATED ORAL at 17:30

## 2022-10-15 RX ADMIN — INSULIN HUMAN 3 UNITS: 100 INJECTION, SOLUTION PARENTERAL at 20:47

## 2022-10-15 RX ADMIN — HYDROMORPHONE HYDROCHLORIDE 2 MG: 2 INJECTION INTRAMUSCULAR; INTRAVENOUS; SUBCUTANEOUS at 20:58

## 2022-10-15 RX ADMIN — INSULIN HUMAN 3 UNITS: 100 INJECTION, SOLUTION PARENTERAL at 06:13

## 2022-10-15 RX ADMIN — OXYCODONE HYDROCHLORIDE 10 MG: 10 TABLET ORAL at 17:24

## 2022-10-15 RX ADMIN — ACETAMINOPHEN 1000 MG: 500 TABLET ORAL at 17:25

## 2022-10-15 RX ADMIN — CEFTRIAXONE SODIUM 2 G: 2 INJECTION, POWDER, FOR SOLUTION INTRAMUSCULAR; INTRAVENOUS at 17:24

## 2022-10-15 RX ADMIN — OXYCODONE 5 MG: 5 TABLET ORAL at 03:59

## 2022-10-15 RX ADMIN — ACETAMINOPHEN 1000 MG: 500 TABLET ORAL at 23:46

## 2022-10-15 RX ADMIN — LISINOPRIL 5 MG: 5 TABLET ORAL at 06:12

## 2022-10-15 RX ADMIN — INSULIN HUMAN 3 UNITS: 100 INJECTION, SOLUTION PARENTERAL at 00:37

## 2022-10-15 RX ADMIN — ACETAMINOPHEN 1000 MG: 500 TABLET ORAL at 12:12

## 2022-10-15 RX ADMIN — RIVAROXABAN 10 MG: 10 TABLET, FILM COATED ORAL at 01:05

## 2022-10-15 ASSESSMENT — LIFESTYLE VARIABLES
EVER HAD A DRINK FIRST THING IN THE MORNING TO STEADY YOUR NERVES TO GET RID OF A HANGOVER: NO
HAVE PEOPLE ANNOYED YOU BY CRITICIZING YOUR DRINKING: NO
ALCOHOL_USE: YES
HOW MANY TIMES IN THE PAST YEAR HAVE YOU HAD 5 OR MORE DRINKS IN A DAY: 0
TOTAL SCORE: 0
ON A TYPICAL DAY WHEN YOU DRINK ALCOHOL HOW MANY DRINKS DO YOU HAVE: 2
TOTAL SCORE: 0
HAVE YOU EVER FELT YOU SHOULD CUT DOWN ON YOUR DRINKING: NO
TOTAL SCORE: 0
AVERAGE NUMBER OF DAYS PER WEEK YOU HAVE A DRINK CONTAINING ALCOHOL: 1
CONSUMPTION TOTAL: NEGATIVE
EVER FELT BAD OR GUILTY ABOUT YOUR DRINKING: NO

## 2022-10-15 ASSESSMENT — PAIN DESCRIPTION - PAIN TYPE
TYPE: ACUTE PAIN
TYPE: ACUTE PAIN;CHRONIC PAIN
TYPE: ACUTE PAIN
TYPE: ACUTE PAIN

## 2022-10-15 ASSESSMENT — COGNITIVE AND FUNCTIONAL STATUS - GENERAL
SUGGESTED CMS G CODE MODIFIER DAILY ACTIVITY: CH
MOBILITY SCORE: 24
SUGGESTED CMS G CODE MODIFIER MOBILITY: CH
DAILY ACTIVITIY SCORE: 24

## 2022-10-15 ASSESSMENT — ENCOUNTER SYMPTOMS
VOMITING: 1
NAUSEA: 1
DIARRHEA: 1

## 2022-10-15 ASSESSMENT — PATIENT HEALTH QUESTIONNAIRE - PHQ9
2. FEELING DOWN, DEPRESSED, IRRITABLE, OR HOPELESS: NOT AT ALL
1. LITTLE INTEREST OR PLEASURE IN DOING THINGS: NOT AT ALL
SUM OF ALL RESPONSES TO PHQ9 QUESTIONS 1 AND 2: 0

## 2022-10-15 ASSESSMENT — FIBROSIS 4 INDEX: FIB4 SCORE: 0.81

## 2022-10-15 NOTE — PROGRESS NOTES
4 Eyes Skin Assessment Completed by Naga RN and ANIRUDH cooper.    Head WDL  Ears WDL  Nose WDL  Mouth WDL  Neck WDL  Breast/Chest WDL  Shoulder Blades WDL  Spine Redness and Blanching  (R) Arm/Elbow/Hand Scar  (L) Arm/Elbow/Hand WDL  Abdomen Redness and Blanching  Groin WDL  Scrotum/Coccyx/Buttocks WDL  (R) Leg Scar  (L) Leg WDL  (R) Heel/Foot/Toe WDL  (L) Heel/Foot/Toe WDL          Devices In Places Blood Pressure Cuff      Interventions In Place Pressure Redistribution Mattress    Possible Skin Injury No    Pictures Uploaded Into Epic N/A  Wound Consult Placed N/A  RN Wound Prevention Protocol Ordered No

## 2022-10-15 NOTE — DIETARY
NUTRITION SERVICES: BMI - Pt with BMI >40 (=Body mass index is 53.07 kg/m².), Class III obesity. Weight loss counseling not appropriate in acute care setting.     RECOMMEND - If appropriate at DC please refer to outpatient nutrition services for weight management.

## 2022-10-15 NOTE — H&P
Hospital Medicine History & Physical Note    Date of Service  10/14/2022    Primary Care Physician  Yeyo Mcmahan M.D.    Consultants  None    Specialist Names: None    Code Status  Full Code    Chief Complaint  Chief Complaint   Patient presents with    Flank Pain     Bilat Onset today    Chills     Has not documented a fever    N/V     Emesis x 3  Some diarrhea Liquid stool x 3 No blood       History of Presenting Illness  Connor Jacinto Jr. is a 37 y.o. male who presented 10/14/2022 with back pain, fever and chills.  Patient states his back pain was centrally located with radiation out to the bilateral flanks.  He describes it as dull with occasional episodes of sharp pain, moderate in severity.  He states his pain actually started last night before going to bed but it was mild, it was more severe whenever he woke up however he went about his day.  Patient does have chronic back pain and thought maybe this was something similar.  He states later in the day began having fever and chills and then had multiple episodes of nausea and vomiting.  He states it was bilious in nature with no blood.  Patient also began having multiple episodes of loose stool.  Because of this, patient presented to the emergency department.  He denies any sick contacts.  I did discuss the case including labs and imaging with the ER physician.    I discussed the plan of care with patient.    Review of Systems  Review of Systems   Constitutional:  Positive for chills, fever and malaise/fatigue.   HENT:  Negative for congestion.    Respiratory:  Negative for cough, sputum production, shortness of breath and stridor.    Cardiovascular:  Negative for chest pain, palpitations and leg swelling.   Gastrointestinal:  Positive for diarrhea, nausea and vomiting. Negative for abdominal pain and constipation.   Genitourinary:  Positive for flank pain. Negative for dysuria and urgency.   Musculoskeletal:  Positive for back pain. Negative for  falls and myalgias.   Neurological:  Negative for dizziness, tingling, loss of consciousness, weakness and headaches.   Psychiatric/Behavioral:  Negative for depression and suicidal ideas.    All other systems reviewed and are negative.    Past Medical History   has a past medical history of Chickenpox, Diabetes (HCC), Obesity, and Sleep apnea.    Surgical History   has a past surgical history that includes knee reconstruction; hernia repair; and pr incis/drain scrotum/testis,epididym (3/17/2022).     Family History  family history includes Diabetes in his father, maternal grandmother, and mother; Heart Disease in his father; Hypertension in his father; Lung Disease in his father.   Family history reviewed with patient. There is no family history that is pertinent to the chief complaint.     Social History   reports that he has quit smoking. His smoking use included cigarettes. His smokeless tobacco use includes chew. He reports current alcohol use. He reports current drug use. Drug: Inhaled.    Allergies  Allergies   Allergen Reactions    Morphine      Pt reports that its makes his body feel on fire        Medications  Prior to Admission Medications   Prescriptions Last Dose Informant Patient Reported? Taking?   lisinopril (PRINIVIL) 5 MG Tab   No No   Sig: Take 1 Tablet by mouth every day.   metFORMIN (GLUCOPHAGE) 500 MG Tab  Patient's Home Pharmacy Yes No   Sig: Take 500 mg by mouth 2 times a day with meals.   ondansetron (ZOFRAN ODT) 4 MG TABLET DISPERSIBLE   No No   Sig: Take 1 Tablet by mouth every 8 hours as needed for Nausea.      Facility-Administered Medications: None       Physical Exam  Temp:  [39.2 °C (102.5 °F)] 39.2 °C (102.5 °F)  Pulse:  [137] 137  Resp:  [28] 28  BP: (138)/(77) 138/77  SpO2:  [93 %] 93 %  Blood Pressure: 138/77   Temperature: (!) 39.2 °C (102.5 °F)   Pulse: (!) 137   Respiration: (!) 28   Pulse Oximetry: 93 %       Physical Exam  Vitals and nursing note reviewed.   Constitutional:        General: He is not in acute distress.     Appearance: He is well-developed. He is obese. He is not toxic-appearing or diaphoretic.   HENT:      Head: Normocephalic and atraumatic.      Right Ear: External ear normal.      Left Ear: External ear normal.      Nose: Nose normal. No congestion or rhinorrhea.      Mouth/Throat:      Mouth: Mucous membranes are dry.      Pharynx: No oropharyngeal exudate.   Eyes:      General:         Right eye: No discharge.         Left eye: No discharge.   Neck:      Trachea: No tracheal deviation.   Cardiovascular:      Rate and Rhythm: Normal rate and regular rhythm.      Heart sounds: No murmur heard.    No friction rub. No gallop.   Pulmonary:      Effort: Pulmonary effort is normal. No respiratory distress.      Breath sounds: Normal breath sounds. No stridor. No wheezing or rales.   Chest:      Chest wall: No tenderness.   Abdominal:      General: Bowel sounds are normal. There is no distension.      Palpations: Abdomen is soft.      Tenderness: There is no abdominal tenderness. There is right CVA tenderness and left CVA tenderness.   Musculoskeletal:         General: No tenderness. Normal range of motion.      Cervical back: Normal range of motion and neck supple. No edema or erythema.      Right lower leg: No edema.      Left lower leg: No edema.   Lymphadenopathy:      Cervical: No cervical adenopathy.   Skin:     General: Skin is warm and dry.      Findings: No erythema or rash.   Neurological:      General: No focal deficit present.      Mental Status: He is alert and oriented to person, place, and time.      Cranial Nerves: No cranial nerve deficit.   Psychiatric:         Mood and Affect: Mood normal.         Behavior: Behavior normal.         Thought Content: Thought content normal.         Judgment: Judgment normal.       Laboratory:  Recent Labs     10/14/22  2238   WBC 16.1*   RBC 5.09   HEMOGLOBIN 14.7   HEMATOCRIT 42.6   MCV 83.7   MCH 28.9   MCHC 34.5   RDW 40.4    PLATELETCT 310   MPV 8.6*     Recent Labs     10/14/22  2238   SODIUM 135   POTASSIUM 4.2   CHLORIDE 97   CO2 21   GLUCOSE 232*   BUN 19   CREATININE 1.02   CALCIUM 9.2     Recent Labs     10/14/22  2238   ALTSGPT 169*   ASTSGOT 86*   ALKPHOSPHAT 99   TBILIRUBIN 1.0   GLUCOSE 232*         No results for input(s): NTPROBNP in the last 72 hours.      No results for input(s): TROPONINT in the last 72 hours.    Imaging:  DX-CHEST-PORTABLE (1 VIEW)   Final Result         1. No acute cardiopulmonary abnormalities are identified.          X-Ray:  I have personally reviewed the images and compared with prior images.    Assessment/Plan:  Justification for Admission Status  I anticipate this patient will require at least two midnights for appropriate medical management, necessitating inpatient admission because sepsis    Patient will need a Med/Surg bed on MEDICAL service .  The need is secondary to sepsis.    * Sepsis (HCC)- (present on admission)  Assessment & Plan  -This is Sepsis Present on admission  SIRS criteria identified on my evaluation include: Fever, with temperature greater than 101 deg F, Tachycardia, with heart rate greater than 90 BPM, Tachypnea, with respirations greater than 20 per minute and Leukocytosis, with WBC greater than 12,000  Source is pyelonephritis  Sepsis protocol initiated  Fluid resuscitation ordered per protocol  Crystalloid Fluid Administration: Fluid resuscitation ordered per standard protocol - 30 mL/kg per current or ideal body weight  IV antibiotics as appropriate for source of sepsis  Reassessment: I have reassessed the patient's hemodynamic status  -Start IV Rocephin  -Await culture results  -Trend lactic acid level  -Patient is at risk of worsening, closely monitor his hemodynamics    Pyelonephritis- (present on admission)  Assessment & Plan  - Presumed pyelonephritis due to bilateral flank pain as well as sepsis  -While his urinalysis is certainly abnormal, it is not as profoundly  abnormal as I would have anticipated  -Patient does have some GI symptoms, obtain COVID  -Start Rocephin    Transaminitis- (present on admission)  Assessment & Plan  - This does appear somewhat chronic, is asymptomatic  -No additional work-up at this time    Hypertension- (present on admission)  Assessment & Plan  - Continue home lisinopril  -Start as needed labetalol  -Adjust as needed    Diabetes mellitus with hyperglycemia (HCC)- (present on admission)  Assessment & Plan  - Hold home metformin  -Start insulin sliding scale  -Adjust as needed    Morbid obesity (HCC)- (present on admission)  Assessment & Plan  - Patient would benefit from diet and exercise adjustment    Addendum: Patient did in fact come back positive for COVID.  I do feel he was dehydrated upon arrival and did need IV fluids, has received boluses and had ongoing fluids. At this time, I do feel he is received adequate fluids for now, due to the positive COVID test I am fearful of fluid overload with ongoing IV fluids.  Patient did receive the boluses for sepsis, he will just no longer have ongoing fluids.  At this time, I do feel COVID-19 is likely the primary cause of his sepsis however I will continue Rocephin for UTI.  Patient has not been vaccinated for COVID-19.    VTE prophylaxis: SCDs/TEDs and Xarelto 10 mg daily as prophylaxis

## 2022-10-15 NOTE — ED NOTES
Medicated per MAR. Pt scanned and medication scanned.  Pt up out of bed to off load. Pt returned to bed, pt laying on side. Ice chips given.

## 2022-10-15 NOTE — ED NOTES
Assist RN: IV started with blood drawn and 1st set of blood cultures.    Patient medicated as ordered after the 2nd set of blood culture drawn by Mercedez OLEARY and IV LR infusing as ordered

## 2022-10-15 NOTE — CARE PLAN
The patient is Watcher - Medium risk of patient condition declining or worsening    Shift Goals  Clinical Goals: Pain management, isolation precautions, monitor labs  Patient Goals: rest, eat    Progress made toward(s) clinical / shift goals:  Pt in heightened pain, acquired new pharmacology for management. Isolation precautions and infection prevention in place. Pt ambulates steadily with no need for assistive device. On room air without dyspnea.    Patient is not progressing towards the following goals: Pain not improving, will continue to monitor with new MAR orders.      Problem: Pain - Standard  Goal: Alleviation of pain or a reduction in pain to the patient’s comfort goal  Outcome: Not Progressing       Problem: Respiratory  Goal: Patient will achieve/maintain optimum respiratory ventilation and gas exchange  Outcome: Progressing     Problem: Urinary - Renal Perfusion  Goal: Ability to achieve and maintain adequate renal perfusion and functioning will improve  Outcome: Progressing

## 2022-10-15 NOTE — ASSESSMENT & PLAN NOTE
- Presumed pyelonephritis due to bilateral flank pain as well as sepsis  -While his urinalysis is certainly abnormal, it is not as profoundly abnormal as I would have anticipated  -Patient does have some GI symptoms, obtain COVID  -Start Rocephin

## 2022-10-15 NOTE — ED NOTES
Introduced self and RN role. Oriented pt. Room and how to get help. Pt verbalizes understanding. IV and labs drawn by Melissa OLEARY. This nurse collected BC x 1 set  L AC

## 2022-10-15 NOTE — ASSESSMENT & PLAN NOTE
-This is Sepsis Present on admission  SIRS criteria identified on my evaluation include: Fever, with temperature greater than 101 deg F, Tachycardia, with heart rate greater than 90 BPM, Tachypnea, with respirations greater than 20 per minute and Leukocytosis, with WBC greater than 12,000  Source is pyelonephritis  Sepsis protocol initiated  Fluid resuscitation ordered per protocol  Crystalloid Fluid Administration: Fluid resuscitation ordered per standard protocol - 30 mL/kg per current or ideal body weight  IV antibiotics as appropriate for source of sepsis  Reassessment: I have reassessed the patient's hemodynamic status  -Start IV Rocephin  -Await culture results  -Trend lactic acid level  -Patient is at risk of worsening, closely monitor his hemodynamics

## 2022-10-16 ENCOUNTER — APPOINTMENT (OUTPATIENT)
Dept: RADIOLOGY | Facility: MEDICAL CENTER | Age: 37
DRG: 871 | End: 2022-10-16
Attending: INTERNAL MEDICINE
Payer: COMMERCIAL

## 2022-10-16 VITALS
SYSTOLIC BLOOD PRESSURE: 153 MMHG | HEART RATE: 62 BPM | OXYGEN SATURATION: 93 % | HEIGHT: 69 IN | BODY MASS INDEX: 46.65 KG/M2 | RESPIRATION RATE: 16 BRPM | TEMPERATURE: 97.9 F | WEIGHT: 315 LBS | DIASTOLIC BLOOD PRESSURE: 75 MMHG

## 2022-10-16 LAB
GLUCOSE BLD STRIP.AUTO-MCNC: 184 MG/DL (ref 65–99)
GLUCOSE BLD STRIP.AUTO-MCNC: 201 MG/DL (ref 65–99)

## 2022-10-16 PROCEDURE — 76775 US EXAM ABDO BACK WALL LIM: CPT

## 2022-10-16 PROCEDURE — 99239 HOSP IP/OBS DSCHRG MGMT >30: CPT | Performed by: INTERNAL MEDICINE

## 2022-10-16 PROCEDURE — 82962 GLUCOSE BLOOD TEST: CPT | Mod: 91

## 2022-10-16 PROCEDURE — A9270 NON-COVERED ITEM OR SERVICE: HCPCS | Performed by: INTERNAL MEDICINE

## 2022-10-16 PROCEDURE — 700105 HCHG RX REV CODE 258: Performed by: INTERNAL MEDICINE

## 2022-10-16 PROCEDURE — 700102 HCHG RX REV CODE 250 W/ 637 OVERRIDE(OP): Performed by: INTERNAL MEDICINE

## 2022-10-16 PROCEDURE — 700111 HCHG RX REV CODE 636 W/ 250 OVERRIDE (IP): Performed by: INTERNAL MEDICINE

## 2022-10-16 RX ORDER — OXYCODONE HYDROCHLORIDE 5 MG/1
5 TABLET ORAL EVERY 8 HOURS PRN
Qty: 12 TABLET | Refills: 0 | Status: SHIPPED | OUTPATIENT
Start: 2022-10-16 | End: 2022-10-19

## 2022-10-16 RX ORDER — LEVOFLOXACIN 500 MG/1
750 TABLET, FILM COATED ORAL DAILY
Qty: 6 TABLET | Refills: 0 | Status: SHIPPED | OUTPATIENT
Start: 2022-10-17 | End: 2022-10-21

## 2022-10-16 RX ADMIN — ACETAMINOPHEN 1000 MG: 500 TABLET ORAL at 12:23

## 2022-10-16 RX ADMIN — INSULIN HUMAN 3 UNITS: 100 INJECTION, SOLUTION PARENTERAL at 12:23

## 2022-10-16 RX ADMIN — OXYCODONE 5 MG: 5 TABLET ORAL at 02:46

## 2022-10-16 RX ADMIN — ONDANSETRON 4 MG: 4 TABLET, ORALLY DISINTEGRATING ORAL at 12:27

## 2022-10-16 RX ADMIN — CEFTRIAXONE SODIUM 2 G: 2 INJECTION, POWDER, FOR SOLUTION INTRAMUSCULAR; INTRAVENOUS at 12:23

## 2022-10-16 RX ADMIN — OXYCODONE HYDROCHLORIDE 10 MG: 10 TABLET ORAL at 12:23

## 2022-10-16 RX ADMIN — LISINOPRIL 5 MG: 5 TABLET ORAL at 05:49

## 2022-10-16 RX ADMIN — INSULIN HUMAN 2 UNITS: 100 INJECTION, SOLUTION PARENTERAL at 05:55

## 2022-10-16 RX ADMIN — ACETAMINOPHEN 1000 MG: 500 TABLET ORAL at 05:48

## 2022-10-16 RX ADMIN — HYDROMORPHONE HYDROCHLORIDE 2 MG: 2 INJECTION INTRAMUSCULAR; INTRAVENOUS; SUBCUTANEOUS at 00:35

## 2022-10-16 ASSESSMENT — PAIN DESCRIPTION - PAIN TYPE
TYPE: ACUTE PAIN

## 2022-10-16 ASSESSMENT — ENCOUNTER SYMPTOMS
VOMITING: 0
NAUSEA: 0
FLANK PAIN: 1
ABDOMINAL PAIN: 0
SHORTNESS OF BREATH: 0

## 2022-10-16 NOTE — PROGRESS NOTES
"Hospital Medicine Daily Progress Note    Date of Service  10/16/2022    Chief Complaint  Connor Jacinto Jr. is a 37 y.o. male admitted 10/14/2022 with flank pain     Hospital Course  Per notes, \"37 y.o. male who presented 10/14/2022 with back pain, fever and chills.  Patient states his back pain was centrally located with radiation out to the bilateral flanks.  He describes it as dull with occasional episodes of sharp pain, moderate in severity.  He states his pain actually started last night before going to bed but it was mild, it was more severe whenever he woke up however he went about his day.  Patient does have chronic back pain and thought maybe this was something similar.  He states later in the day began having fever and chills and then had multiple episodes of nausea and vomiting.  He states it was bilious in nature with no blood.  Patient also began having multiple episodes of loose stool.  Because of this, patient presented to the emergency department.  He denies any sick contacts.  I did discuss the case including labs and imaging with the ER physician.\"      Interval Problem Update  Still having pain, pain meds added for better control. NGT on cultures    I have discussed this patient's plan of care and discharge plan at IDT rounds today with Case Management, Nursing, Nursing leadership, and other members of the IDT team.    Consultants/Specialty  urology    Code Status  Full Code    Disposition  Patient is not medically cleared for discharge.   Anticipate discharge to to home with close outpatient follow-up.  I have placed the appropriate orders for post-discharge needs.    Review of Systems  Review of Systems   Constitutional:  Positive for malaise/fatigue.   Respiratory:  Negative for shortness of breath.    Gastrointestinal:  Negative for abdominal pain, nausea and vomiting.   Genitourinary:  Positive for flank pain.   All other systems reviewed and are negative.     Physical Exam  Temp:  [36.6 °C " (97.9 °F)-37 °C (98.6 °F)] 36.6 °C (97.9 °F)  Pulse:  [62-86] 62  Resp:  [16-20] 16  BP: (133-153)/(75-85) 153/75  SpO2:  [93 %-96 %] 93 %    Physical Exam  Vitals and nursing note reviewed.   Constitutional:       Appearance: Normal appearance. He is obese.   Cardiovascular:      Rate and Rhythm: Normal rate and regular rhythm.      Pulses: Normal pulses.      Heart sounds: Normal heart sounds.   Pulmonary:      Effort: Pulmonary effort is normal.      Breath sounds: Normal breath sounds.   Abdominal:      Tenderness: There is right CVA tenderness and left CVA tenderness.   Musculoskeletal:      Right lower leg: No edema.      Left lower leg: No edema.   Skin:     General: Skin is warm and dry.   Neurological:      General: No focal deficit present.      Mental Status: He is alert and oriented to person, place, and time. Mental status is at baseline.       Fluids    Intake/Output Summary (Last 24 hours) at 10/16/2022 1246  Last data filed at 10/16/2022 0225  Gross per 24 hour   Intake 2720 ml   Output --   Net 2720 ml       Laboratory  Recent Labs     10/14/22  2238 10/15/22  0620   WBC 16.1* 12.2*   RBC 5.09 4.54*   HEMOGLOBIN 14.7 13.1*   HEMATOCRIT 42.6 38.3*   MCV 83.7 84.4   MCH 28.9 28.9   MCHC 34.5 34.2   RDW 40.4 41.3   PLATELETCT 310 255   MPV 8.6* 8.7*     Recent Labs     10/14/22  2238 10/15/22  0620   SODIUM 135 136   POTASSIUM 4.2 3.9   CHLORIDE 97 97   CO2 21 21   GLUCOSE 232* 220*   BUN 19 20   CREATININE 1.02 1.07   CALCIUM 9.2 8.7     Recent Labs     10/14/22  2238   INR 0.98               Imaging  DX-CHEST-PORTABLE (1 VIEW)   Final Result         1. No acute cardiopulmonary abnormalities are identified.      US-RENAL    (Results Pending)        Assessment/Plan  * Sepsis (HCC)- (present on admission)  Assessment & Plan  -This is Sepsis Present on admission  SIRS criteria identified on my evaluation include: Fever, with temperature greater than 101 deg F, Tachycardia, with heart rate greater than 90  BPM, Tachypnea, with respirations greater than 20 per minute and Leukocytosis, with WBC greater than 12,000  Source is pyelonephritis  Sepsis protocol initiated  Fluid resuscitation ordered per protocol  Crystalloid Fluid Administration: Fluid resuscitation ordered per standard protocol - 30 mL/kg per current or ideal body weight  IV antibiotics as appropriate for source of sepsis  Reassessment: I have reassessed the patient's hemodynamic status  -Start IV Rocephin  -Await culture results  -Trend lactic acid level  -Patient is at risk of worsening, closely monitor his hemodynamics    Transaminitis- (present on admission)  Assessment & Plan  - This does appear somewhat chronic, is asymptomatic  -No additional work-up at this time    Hypertension- (present on admission)  Assessment & Plan  - Continue home lisinopril  -Start as needed labetalol  -Adjust as needed    Pyelonephritis- (present on admission)  Assessment & Plan  - Presumed pyelonephritis due to bilateral flank pain as well as sepsis  -While his urinalysis is certainly abnormal, it is not as profoundly abnormal as I would have anticipated  -Patient does have some GI symptoms, obtain COVID  -Start Rocephin    Diabetes mellitus with hyperglycemia (HCC)- (present on admission)  Assessment & Plan  - Hold home metformin  -Start insulin sliding scale  -Adjust as needed    Morbid obesity (HCC)- (present on admission)  Assessment & Plan  - Patient would benefit from diet and exercise adjustment       VTE prophylaxis: SCDs/TEDs    I have performed a physical exam and reviewed and updated ROS and Plan today (10/16/2022). In review of yesterday's note (10/15/2022), there are no changes except as documented above.

## 2022-10-16 NOTE — DISCHARGE SUMMARY
"Discharge Summary    CHIEF COMPLAINT ON ADMISSION  Chief Complaint   Patient presents with    Flank Pain     Bilat Onset today    Chills     Has not documented a fever    N/V     Emesis x 3  Some diarrhea Liquid stool x 3 No blood       Reason for Admission  Back Pain; Vomitting     Admission Date  10/14/2022    CODE STATUS  Full Code    HPI & HOSPITAL COURSE  Per notes, \"37 y.o. male who presented 10/14/2022 with back pain, fever and chills.  Patient states his back pain was centrally located with radiation out to the bilateral flanks.  He describes it as dull with occasional episodes of sharp pain, moderate in severity.  He states his pain actually started last night before going to bed but it was mild, it was more severe whenever he woke up however he went about his day.  Patient does have chronic back pain and thought maybe this was something similar.  He states later in the day began having fever and chills and then had multiple episodes of nausea and vomiting.  He states it was bilious in nature with no blood.  Patient also began having multiple episodes of loose stool.  Because of this, patient presented to the emergency department.  He denies any sick contacts.  I did discuss the case including labs and imaging with the ER physician.\"    Patient was admitted and started on broad-spectrum antibiotics for suspected pyelonephritis.  Patient was also found to be positive for COVID-19, has had no symptoms of this.  Patient was monitored closely due to profound flank pain.  Patient's flank pain and symptoms had resolved by the day of discharge, has been no growth on cultures. Patient is exhibiting no symptoms of syptoms of COVID-19 infection, not currently requiring supplemental oxygen. Given patient's persistent urinary infections, I have recommended follow-up with urology in the outpatient setting.     Therefore, he is discharged in good and stable condition to home with close outpatient follow-up.    The " patient met 2-midnight criteria for an inpatient stay at the time of discharge.    Discharge Date  10/16/2022    FOLLOW UP ITEMS POST DISCHARGE  FU with urology   FU with PCP     DISCHARGE DIAGNOSES  Principal Problem:    Sepsis (HCC) POA: Yes  Active Problems:    Morbid obesity (HCC) POA: Yes    Diabetes mellitus with hyperglycemia (HCC) POA: Yes    Pyelonephritis POA: Yes    Hypertension POA: Yes    Transaminitis POA: Yes    COVID-19 virus infection POA: Yes  Resolved Problems:    * No resolved hospital problems. *      FOLLOW UP  No future appointments.  No follow-up provider specified.    MEDICATIONS ON DISCHARGE     Medication List        START taking these medications        Instructions   levoFLOXacin 500 MG tablet  Start taking on: October 17, 2022  Commonly known as: LEVAQUIN   Take 1.5 Tablets by mouth every day for 4 days.  Dose: 750 mg     oxyCODONE immediate-release 5 MG Tabs  Commonly known as: ROXICODONE   Take 1 Tablet by mouth every 8 hours as needed for Severe Pain for up to 3 days.  Dose: 5 mg            CONTINUE taking these medications        Instructions   atorvastatin 20 MG Tabs  Commonly known as: LIPITOR   Take 20 mg by mouth every evening.  Dose: 20 mg     lisinopril 5 MG Tabs  Commonly known as: PRINIVIL   Take 1 Tablet by mouth every day.  Dose: 5 mg     metFORMIN 500 MG Tabs  Commonly known as: GLUCOPHAGE   Take 1,000 mg by mouth 2 times a day with meals.  Dose: 1,000 mg     ondansetron 4 MG Tbdp  Commonly known as: ZOFRAN ODT   Take 1 Tablet by mouth every 8 hours as needed for Nausea.  Dose: 4 mg              Allergies  Allergies   Allergen Reactions    Morphine      Pt reports that its makes his body feel on fire        DIET  Orders Placed This Encounter   Procedures    Diet Order Diet: Consistent CHO (Diabetic)     Standing Status:   Standing     Number of Occurrences:   1     Order Specific Question:   Diet:     Answer:   Consistent CHO (Diabetic) [4]       ACTIVITY  As  tolerated.  Weight bearing as tolerated    CONSULTATIONS  None    PROCEDURES  None    LABORATORY  Lab Results   Component Value Date    SODIUM 136 10/15/2022    POTASSIUM 3.9 10/15/2022    CHLORIDE 97 10/15/2022    CO2 21 10/15/2022    GLUCOSE 220 (H) 10/15/2022    BUN 20 10/15/2022    CREATININE 1.07 10/15/2022        Lab Results   Component Value Date    WBC 12.2 (H) 10/15/2022    HEMOGLOBIN 13.1 (L) 10/15/2022    HEMATOCRIT 38.3 (L) 10/15/2022    PLATELETCT 255 10/15/2022        Total time of the discharge process exceeds 45 minutes.

## 2022-10-16 NOTE — PROGRESS NOTES
Received bedside report from RN Shakila Casillas, pt care assumed. VS stable, pt assessment complete. Pt A&Ox4, Pt c/o 8/10 pain at this time. POC discussed with pt and verbalizes no questions. Pt denies any additional needs at this time. Bed locked and in lowest position. Pt educated on fall risk and verbalized understanding, call light within reach, hourly rounding initiated.

## 2022-10-16 NOTE — CARE PLAN
The patient is Stable - Low risk of patient condition declining or worsening    Shift Goals  Clinical Goals: Pain managment, BS control  Patient Goals: Rest    Progress made toward(s) clinical / shift goals:    Problem: Pain - Standard  Goal: Alleviation of pain or a reduction in pain to the patient’s comfort goal  Outcome: Not Met     Problem: Diabetes Management  Goal: Patient will achieve and maintain glucose in satisfactory range  Outcome: Not Met     Problem: Knowledge Deficit - Standard  Goal: Patient and family/care givers will demonstrate understanding of plan of care, disease process/condition, diagnostic tests and medications  Outcome: Progressing     Problem: Hemodynamics  Goal: Patient's hemodynamics, fluid balance and neurologic status will be stable or improve  Outcome: Progressing     Problem: Fluid Volume  Goal: Fluid volume balance will be maintained  Outcome: Progressing     Problem: Respiratory  Goal: Patient will achieve/maintain optimum respiratory ventilation and gas exchange  Outcome: Progressing     Problem: Knowledge Deficit - Diabetes  Goal: Patient will demonstrate knowledge of insulin injection, symptoms, and treatment of hypoglycemia and diet prior to discharge  Outcome: Progressing       Patient is not progressing towards the following goals:      Problem: Pain - Standard  Goal: Alleviation of pain or a reduction in pain to the patient’s comfort goal  Outcome: Not Met     Problem: Diabetes Management  Goal: Patient will achieve and maintain glucose in satisfactory range  Outcome: Not Met

## 2022-10-17 LAB
BACTERIA UR CULT: NORMAL
SIGNIFICANT IND 70042: NORMAL
SITE SITE: NORMAL
SOURCE SOURCE: NORMAL

## 2022-11-10 ENCOUNTER — APPOINTMENT (OUTPATIENT)
Dept: RADIOLOGY | Facility: MEDICAL CENTER | Age: 37
End: 2022-11-10
Attending: EMERGENCY MEDICINE
Payer: COMMERCIAL

## 2022-11-10 ENCOUNTER — HOSPITAL ENCOUNTER (EMERGENCY)
Facility: MEDICAL CENTER | Age: 37
End: 2022-11-11
Attending: EMERGENCY MEDICINE
Payer: COMMERCIAL

## 2022-11-10 DIAGNOSIS — R55 NEAR SYNCOPE: ICD-10-CM

## 2022-11-10 DIAGNOSIS — E11.65 UNCONTROLLED TYPE 2 DIABETES MELLITUS WITH HYPERGLYCEMIA, WITHOUT LONG-TERM CURRENT USE OF INSULIN (HCC): ICD-10-CM

## 2022-11-10 DIAGNOSIS — E86.0 DEHYDRATION: ICD-10-CM

## 2022-11-10 DIAGNOSIS — J20.5 RSV BRONCHITIS: ICD-10-CM

## 2022-11-10 LAB
ALBUMIN SERPL BCP-MCNC: 4.8 G/DL (ref 3.2–4.9)
ALBUMIN/GLOB SERPL: 1.4 G/DL
ALP SERPL-CCNC: 114 U/L (ref 30–99)
ALT SERPL-CCNC: 101 U/L (ref 2–50)
ANION GAP SERPL CALC-SCNC: 17 MMOL/L (ref 7–16)
AST SERPL-CCNC: 43 U/L (ref 12–45)
BILIRUB SERPL-MCNC: 0.8 MG/DL (ref 0.1–1.5)
BUN SERPL-MCNC: 21 MG/DL (ref 8–22)
CALCIUM SERPL-MCNC: 9.6 MG/DL (ref 8.4–10.2)
CHLORIDE SERPL-SCNC: 99 MMOL/L (ref 96–112)
CO2 SERPL-SCNC: 18 MMOL/L (ref 20–33)
CREAT SERPL-MCNC: 0.95 MG/DL (ref 0.5–1.4)
EKG IMPRESSION: NORMAL
GFR SERPLBLD CREATININE-BSD FMLA CKD-EPI: 105 ML/MIN/1.73 M 2
GLOBULIN SER CALC-MCNC: 3.4 G/DL (ref 1.9–3.5)
GLUCOSE SERPL-MCNC: 275 MG/DL (ref 65–99)
LACTATE SERPL-SCNC: 3.6 MMOL/L (ref 0.5–2)
NT-PROBNP SERPL IA-MCNC: 50 PG/ML (ref 0–125)
POTASSIUM SERPL-SCNC: 4.2 MMOL/L (ref 3.6–5.5)
PROT SERPL-MCNC: 8.2 G/DL (ref 6–8.2)
SODIUM SERPL-SCNC: 134 MMOL/L (ref 135–145)
TROPONIN T SERPL-MCNC: 11 NG/L (ref 6–19)

## 2022-11-10 PROCEDURE — 36415 COLL VENOUS BLD VENIPUNCTURE: CPT

## 2022-11-10 PROCEDURE — 80053 COMPREHEN METABOLIC PANEL: CPT

## 2022-11-10 PROCEDURE — 85007 BL SMEAR W/DIFF WBC COUNT: CPT

## 2022-11-10 PROCEDURE — C9803 HOPD COVID-19 SPEC COLLECT: HCPCS | Performed by: EMERGENCY MEDICINE

## 2022-11-10 PROCEDURE — 85025 COMPLETE CBC W/AUTO DIFF WBC: CPT

## 2022-11-10 PROCEDURE — 700105 HCHG RX REV CODE 258: Performed by: EMERGENCY MEDICINE

## 2022-11-10 PROCEDURE — 96374 THER/PROPH/DIAG INJ IV PUSH: CPT

## 2022-11-10 PROCEDURE — 700111 HCHG RX REV CODE 636 W/ 250 OVERRIDE (IP): Performed by: EMERGENCY MEDICINE

## 2022-11-10 PROCEDURE — 83605 ASSAY OF LACTIC ACID: CPT

## 2022-11-10 PROCEDURE — 0241U HCHG SARS-COV-2 COVID-19 NFCT DS RESP RNA 4 TRGT MIC: CPT

## 2022-11-10 PROCEDURE — 99285 EMERGENCY DEPT VISIT HI MDM: CPT

## 2022-11-10 PROCEDURE — 93005 ELECTROCARDIOGRAM TRACING: CPT | Performed by: EMERGENCY MEDICINE

## 2022-11-10 PROCEDURE — 87040 BLOOD CULTURE FOR BACTERIA: CPT

## 2022-11-10 PROCEDURE — 83880 ASSAY OF NATRIURETIC PEPTIDE: CPT

## 2022-11-10 PROCEDURE — 94760 N-INVAS EAR/PLS OXIMETRY 1: CPT

## 2022-11-10 PROCEDURE — 84484 ASSAY OF TROPONIN QUANT: CPT

## 2022-11-10 PROCEDURE — 96361 HYDRATE IV INFUSION ADD-ON: CPT

## 2022-11-10 PROCEDURE — 96375 TX/PRO/DX INJ NEW DRUG ADDON: CPT

## 2022-11-10 PROCEDURE — 700101 HCHG RX REV CODE 250: Performed by: EMERGENCY MEDICINE

## 2022-11-10 RX ORDER — ONDANSETRON 2 MG/ML
4 INJECTION INTRAMUSCULAR; INTRAVENOUS ONCE
Status: COMPLETED | OUTPATIENT
Start: 2022-11-11 | End: 2022-11-11

## 2022-11-10 RX ORDER — SODIUM CHLORIDE, SODIUM LACTATE, POTASSIUM CHLORIDE, AND CALCIUM CHLORIDE .6; .31; .03; .02 G/100ML; G/100ML; G/100ML; G/100ML
30 INJECTION, SOLUTION INTRAVENOUS ONCE
Status: COMPLETED | OUTPATIENT
Start: 2022-11-10 | End: 2022-11-11

## 2022-11-10 RX ORDER — METHYLPREDNISOLONE SODIUM SUCCINATE 40 MG/ML
40 INJECTION, POWDER, LYOPHILIZED, FOR SOLUTION INTRAMUSCULAR; INTRAVENOUS ONCE
Status: COMPLETED | OUTPATIENT
Start: 2022-11-10 | End: 2022-11-10

## 2022-11-10 RX ORDER — KETOROLAC TROMETHAMINE 30 MG/ML
15 INJECTION, SOLUTION INTRAMUSCULAR; INTRAVENOUS ONCE
Status: COMPLETED | OUTPATIENT
Start: 2022-11-10 | End: 2022-11-10

## 2022-11-10 RX ADMIN — METHYLPREDNISOLONE SODIUM SUCCINATE 40 MG: 40 INJECTION, POWDER, FOR SOLUTION INTRAMUSCULAR; INTRAVENOUS at 23:30

## 2022-11-10 RX ADMIN — KETOROLAC TROMETHAMINE 15 MG: 30 INJECTION, SOLUTION INTRAMUSCULAR at 23:30

## 2022-11-10 RX ADMIN — SODIUM CHLORIDE, POTASSIUM CHLORIDE, SODIUM LACTATE AND CALCIUM CHLORIDE 2121 ML: 600; 310; 30; 20 INJECTION, SOLUTION INTRAVENOUS at 23:30

## 2022-11-10 RX ADMIN — ALBUTEROL SULFATE 2.5 MG: 2.5 SOLUTION RESPIRATORY (INHALATION) at 23:53

## 2022-11-10 ASSESSMENT — FIBROSIS 4 INDEX: FIB4 SCORE: 0.99

## 2022-11-11 ENCOUNTER — APPOINTMENT (OUTPATIENT)
Dept: RADIOLOGY | Facility: MEDICAL CENTER | Age: 37
End: 2022-11-11
Attending: EMERGENCY MEDICINE
Payer: COMMERCIAL

## 2022-11-11 VITALS
SYSTOLIC BLOOD PRESSURE: 132 MMHG | HEART RATE: 115 BPM | WEIGHT: 315 LBS | BODY MASS INDEX: 46.65 KG/M2 | HEIGHT: 69 IN | OXYGEN SATURATION: 91 % | DIASTOLIC BLOOD PRESSURE: 53 MMHG | RESPIRATION RATE: 24 BRPM | TEMPERATURE: 98.4 F

## 2022-11-11 LAB
APPEARANCE UR: CLEAR
BACTERIA #/AREA URNS HPF: NEGATIVE /HPF
BASE EXCESS BLDV CALC-SCNC: -3 MMOL/L
BASOPHILS # BLD AUTO: 0 % (ref 0–1.8)
BASOPHILS # BLD: 0 K/UL (ref 0–0.12)
BILIRUB UR QL STRIP.AUTO: NEGATIVE
BODY TEMPERATURE: 36.9 CENTIGRADE
COLOR UR: YELLOW
EOSINOPHIL # BLD AUTO: 0 K/UL (ref 0–0.51)
EOSINOPHIL NFR BLD: 0 % (ref 0–6.9)
EPI CELLS #/AREA URNS HPF: ABNORMAL /HPF
ERYTHROCYTE [DISTWIDTH] IN BLOOD BY AUTOMATED COUNT: 39.9 FL (ref 35.9–50)
FLUAV RNA SPEC QL NAA+PROBE: NEGATIVE
FLUBV RNA SPEC QL NAA+PROBE: NEGATIVE
GLUCOSE UR STRIP.AUTO-MCNC: 100 MG/DL
HCO3 BLDV-SCNC: 18 MMOL/L (ref 24–28)
HCT VFR BLD AUTO: 46.4 % (ref 42–52)
HGB BLD-MCNC: 15.7 G/DL (ref 14–18)
KETONES UR STRIP.AUTO-MCNC: NEGATIVE MG/DL
LACTATE SERPL-SCNC: 3.3 MMOL/L (ref 0.5–2)
LEUKOCYTE ESTERASE UR QL STRIP.AUTO: ABNORMAL
LYMPHOCYTES # BLD AUTO: 1.83 K/UL (ref 1–4.8)
LYMPHOCYTES NFR BLD: 14 % (ref 22–41)
MANUAL DIFF BLD: ABNORMAL
MCH RBC QN AUTO: 28.8 PG (ref 27–33)
MCHC RBC AUTO-ENTMCNC: 33.8 G/DL (ref 33.7–35.3)
MCV RBC AUTO: 85 FL (ref 81.4–97.8)
MICRO URNS: ABNORMAL
MONOCYTES # BLD AUTO: 0.26 K/UL (ref 0–0.85)
MONOCYTES NFR BLD AUTO: 2 % (ref 0–13.4)
MYELOCYTES NFR BLD MANUAL: 1 %
NEUTROPHILS # BLD AUTO: 10.87 K/UL (ref 1.82–7.42)
NEUTROPHILS NFR BLD: 69 % (ref 44–72)
NEUTS BAND NFR BLD MANUAL: 14 % (ref 0–10)
NITRITE UR QL STRIP.AUTO: NEGATIVE
NRBC # BLD AUTO: 0 K/UL
NRBC BLD-RTO: 0 /100 WBC
PCO2 BLDV: 25 MMHG (ref 41–51)
PH BLDV: 7.49 [PH] (ref 7.31–7.45)
PH UR STRIP.AUTO: 6 [PH] (ref 5–8)
PLATELET # BLD AUTO: 355 K/UL (ref 164–446)
PLATELET BLD QL SMEAR: NORMAL
PMV BLD AUTO: 9.1 FL (ref 9–12.9)
PO2 BLDV: 142.7 MMHG (ref 25–40)
PROT UR QL STRIP: NEGATIVE MG/DL
RBC # BLD AUTO: 5.46 M/UL (ref 4.7–6.1)
RBC # URNS HPF: ABNORMAL /HPF
RBC BLD AUTO: NORMAL
RBC UR QL AUTO: NEGATIVE
RSV RNA SPEC QL NAA+PROBE: POSITIVE
SAO2 % BLDV: 99.2 %
SARS-COV-2 RNA RESP QL NAA+PROBE: NOTDETECTED
SP GR UR STRIP.AUTO: <=1.005
SPECIMEN SOURCE: ABNORMAL
WBC # BLD AUTO: 13.1 K/UL (ref 4.8–10.8)
WBC #/AREA URNS HPF: ABNORMAL /HPF

## 2022-11-11 PROCEDURE — 83605 ASSAY OF LACTIC ACID: CPT

## 2022-11-11 PROCEDURE — 87086 URINE CULTURE/COLONY COUNT: CPT

## 2022-11-11 PROCEDURE — 96375 TX/PRO/DX INJ NEW DRUG ADDON: CPT

## 2022-11-11 PROCEDURE — 82803 BLOOD GASES ANY COMBINATION: CPT

## 2022-11-11 PROCEDURE — 96361 HYDRATE IV INFUSION ADD-ON: CPT

## 2022-11-11 PROCEDURE — 94640 AIRWAY INHALATION TREATMENT: CPT

## 2022-11-11 PROCEDURE — 87040 BLOOD CULTURE FOR BACTERIA: CPT

## 2022-11-11 PROCEDURE — 71045 X-RAY EXAM CHEST 1 VIEW: CPT

## 2022-11-11 PROCEDURE — 81001 URINALYSIS AUTO W/SCOPE: CPT

## 2022-11-11 PROCEDURE — 700111 HCHG RX REV CODE 636 W/ 250 OVERRIDE (IP): Performed by: EMERGENCY MEDICINE

## 2022-11-11 RX ORDER — PREDNISONE 20 MG/1
40 TABLET ORAL DAILY
Qty: 30 TABLET | Refills: 0 | Status: ON HOLD | OUTPATIENT
Start: 2022-11-11 | End: 2022-11-14

## 2022-11-11 RX ORDER — ALBUTEROL SULFATE 90 UG/1
2 AEROSOL, METERED RESPIRATORY (INHALATION) EVERY 4 HOURS PRN
Qty: 1 EACH | Refills: 1 | Status: SHIPPED | OUTPATIENT
Start: 2022-11-11 | End: 2023-02-04

## 2022-11-11 RX ADMIN — ONDANSETRON 4 MG: 2 INJECTION INTRAMUSCULAR; INTRAVENOUS at 00:00
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
## 2022-11-11 NOTE — ED NOTES
Discharge instructions given to patient. All questions and concerns addressed. Patient alert and oriented x4. Patient able to ambulate independently, and reported improved symptoms upon discharge.

## 2022-11-13 ENCOUNTER — HOSPITAL ENCOUNTER (OUTPATIENT)
Facility: MEDICAL CENTER | Age: 37
End: 2022-11-14
Attending: EMERGENCY MEDICINE | Admitting: INTERNAL MEDICINE
Payer: COMMERCIAL

## 2022-11-13 ENCOUNTER — APPOINTMENT (OUTPATIENT)
Dept: RADIOLOGY | Facility: MEDICAL CENTER | Age: 37
End: 2022-11-13
Attending: EMERGENCY MEDICINE
Payer: COMMERCIAL

## 2022-11-13 DIAGNOSIS — R79.89 ELEVATED LACTIC ACID LEVEL: ICD-10-CM

## 2022-11-13 DIAGNOSIS — B33.8 RSV (RESPIRATORY SYNCYTIAL VIRUS INFECTION): ICD-10-CM

## 2022-11-13 DIAGNOSIS — E11.65 TYPE 2 DIABETES MELLITUS WITH HYPERGLYCEMIA, WITHOUT LONG-TERM CURRENT USE OF INSULIN (HCC): ICD-10-CM

## 2022-11-13 DIAGNOSIS — R42 LIGHTHEADED: ICD-10-CM

## 2022-11-13 DIAGNOSIS — R73.9 HYPERGLYCEMIA: ICD-10-CM

## 2022-11-13 PROBLEM — E83.52 HYPERCALCEMIA: Status: ACTIVE | Noted: 2022-11-13

## 2022-11-13 PROBLEM — J20.5 RSV BRONCHITIS: Status: ACTIVE | Noted: 2022-11-13

## 2022-11-13 PROBLEM — E87.1 HYPONATREMIA: Status: ACTIVE | Noted: 2022-11-13

## 2022-11-13 LAB
ALBUMIN SERPL BCP-MCNC: 4.6 G/DL (ref 3.2–4.9)
ALBUMIN/GLOB SERPL: 1.4 G/DL
ALP SERPL-CCNC: 101 U/L (ref 30–99)
ALT SERPL-CCNC: 57 U/L (ref 2–50)
ANION GAP SERPL CALC-SCNC: 15 MMOL/L (ref 7–16)
APPEARANCE UR: CLEAR
AST SERPL-CCNC: 26 U/L (ref 12–45)
B-OH-BUTYR SERPL-MCNC: 0.14 MMOL/L (ref 0.02–0.27)
BACTERIA #/AREA URNS HPF: NEGATIVE /HPF
BACTERIA UR CULT: NORMAL
BASOPHILS # BLD AUTO: 0 % (ref 0–1.8)
BASOPHILS # BLD: 0 K/UL (ref 0–0.12)
BILIRUB SERPL-MCNC: 0.4 MG/DL (ref 0.1–1.5)
BILIRUB UR QL STRIP.AUTO: NEGATIVE
BLOOD CULTURE HOLD CXBCH: NORMAL
BUN SERPL-MCNC: 21 MG/DL (ref 8–22)
CALCIUM SERPL-MCNC: 10.7 MG/DL (ref 8.4–10.2)
CHLORIDE SERPL-SCNC: 92 MMOL/L (ref 96–112)
CK SERPL-CCNC: 180 U/L (ref 0–154)
CO2 SERPL-SCNC: 26 MMOL/L (ref 20–33)
COLOR UR: YELLOW
CREAT SERPL-MCNC: 0.78 MG/DL (ref 0.5–1.4)
EKG IMPRESSION: NORMAL
EOSINOPHIL # BLD AUTO: 0.3 K/UL (ref 0–0.51)
EOSINOPHIL NFR BLD: 3 % (ref 0–6.9)
EPI CELLS #/AREA URNS HPF: ABNORMAL /HPF
ERYTHROCYTE [DISTWIDTH] IN BLOOD BY AUTOMATED COUNT: 39.9 FL (ref 35.9–50)
GFR SERPLBLD CREATININE-BSD FMLA CKD-EPI: 117 ML/MIN/1.73 M 2
GLOBULIN SER CALC-MCNC: 3.3 G/DL (ref 1.9–3.5)
GLUCOSE BLD STRIP.AUTO-MCNC: 252 MG/DL (ref 65–99)
GLUCOSE BLD STRIP.AUTO-MCNC: 260 MG/DL (ref 65–99)
GLUCOSE SERPL-MCNC: 309 MG/DL (ref 65–99)
GLUCOSE UR STRIP.AUTO-MCNC: 100 MG/DL
HCT VFR BLD AUTO: 39.2 % (ref 42–52)
HGB BLD-MCNC: 13.3 G/DL (ref 14–18)
KETONES UR STRIP.AUTO-MCNC: NEGATIVE MG/DL
LACTATE SERPL-SCNC: 3.1 MMOL/L (ref 0.5–2)
LEUKOCYTE ESTERASE UR QL STRIP.AUTO: NEGATIVE
LYMPHOCYTES # BLD AUTO: 3.66 K/UL (ref 1–4.8)
LYMPHOCYTES NFR BLD: 37 % (ref 22–41)
MANUAL DIFF BLD: NORMAL
MCH RBC QN AUTO: 28.7 PG (ref 27–33)
MCHC RBC AUTO-ENTMCNC: 33.9 G/DL (ref 33.7–35.3)
MCV RBC AUTO: 84.5 FL (ref 81.4–97.8)
MICRO URNS: ABNORMAL
MONOCYTES # BLD AUTO: 0.89 K/UL (ref 0–0.85)
MONOCYTES NFR BLD AUTO: 9 % (ref 0–13.4)
NEUTROPHILS # BLD AUTO: 5.05 K/UL (ref 1.82–7.42)
NEUTROPHILS NFR BLD: 50 % (ref 44–72)
NEUTS BAND NFR BLD MANUAL: 1 % (ref 0–10)
NITRITE UR QL STRIP.AUTO: NEGATIVE
NRBC # BLD AUTO: 0 K/UL
NRBC BLD-RTO: 0 /100 WBC
NT-PROBNP SERPL IA-MCNC: 40 PG/ML (ref 0–125)
PH UR STRIP.AUTO: 6 [PH] (ref 5–8)
PLATELET # BLD AUTO: 258 K/UL (ref 164–446)
PLATELET BLD QL SMEAR: NORMAL
PMV BLD AUTO: 9.3 FL (ref 9–12.9)
POTASSIUM SERPL-SCNC: 3.8 MMOL/L (ref 3.6–5.5)
PROCALCITONIN SERPL-MCNC: 0.1 NG/ML
PROT SERPL-MCNC: 7.9 G/DL (ref 6–8.2)
PROT UR QL STRIP: NEGATIVE MG/DL
RBC # BLD AUTO: 4.64 M/UL (ref 4.7–6.1)
RBC # URNS HPF: ABNORMAL /HPF
RBC BLD AUTO: NORMAL
RBC UR QL AUTO: ABNORMAL
SIGNIFICANT IND 70042: NORMAL
SITE SITE: NORMAL
SODIUM SERPL-SCNC: 133 MMOL/L (ref 135–145)
SOURCE SOURCE: NORMAL
SP GR UR STRIP.AUTO: 1.01
WBC # BLD AUTO: 9.9 K/UL (ref 4.8–10.8)
WBC #/AREA URNS HPF: ABNORMAL /HPF

## 2022-11-13 PROCEDURE — 94660 CPAP INITIATION&MGMT: CPT

## 2022-11-13 PROCEDURE — 84145 PROCALCITONIN (PCT): CPT

## 2022-11-13 PROCEDURE — 71045 X-RAY EXAM CHEST 1 VIEW: CPT

## 2022-11-13 PROCEDURE — 700111 HCHG RX REV CODE 636 W/ 250 OVERRIDE (IP): Performed by: EMERGENCY MEDICINE

## 2022-11-13 PROCEDURE — 93005 ELECTROCARDIOGRAM TRACING: CPT | Performed by: EMERGENCY MEDICINE

## 2022-11-13 PROCEDURE — 82550 ASSAY OF CK (CPK): CPT

## 2022-11-13 PROCEDURE — 80053 COMPREHEN METABOLIC PANEL: CPT

## 2022-11-13 PROCEDURE — 700102 HCHG RX REV CODE 250 W/ 637 OVERRIDE(OP): Performed by: INTERNAL MEDICINE

## 2022-11-13 PROCEDURE — 96372 THER/PROPH/DIAG INJ SC/IM: CPT

## 2022-11-13 PROCEDURE — 96374 THER/PROPH/DIAG INJ IV PUSH: CPT

## 2022-11-13 PROCEDURE — 81001 URINALYSIS AUTO W/SCOPE: CPT

## 2022-11-13 PROCEDURE — 83605 ASSAY OF LACTIC ACID: CPT

## 2022-11-13 PROCEDURE — 94760 N-INVAS EAR/PLS OXIMETRY 1: CPT

## 2022-11-13 PROCEDURE — 700105 HCHG RX REV CODE 258: Performed by: EMERGENCY MEDICINE

## 2022-11-13 PROCEDURE — A9270 NON-COVERED ITEM OR SERVICE: HCPCS | Performed by: HOSPITALIST

## 2022-11-13 PROCEDURE — 82010 KETONE BODYS QUAN: CPT

## 2022-11-13 PROCEDURE — 36415 COLL VENOUS BLD VENIPUNCTURE: CPT

## 2022-11-13 PROCEDURE — G0378 HOSPITAL OBSERVATION PER HR: HCPCS

## 2022-11-13 PROCEDURE — 82962 GLUCOSE BLOOD TEST: CPT | Mod: 91

## 2022-11-13 PROCEDURE — 99285 EMERGENCY DEPT VISIT HI MDM: CPT

## 2022-11-13 PROCEDURE — 99220 PR INITIAL OBSERVATION CARE,LEVL III: CPT | Performed by: INTERNAL MEDICINE

## 2022-11-13 PROCEDURE — 85007 BL SMEAR W/DIFF WBC COUNT: CPT

## 2022-11-13 PROCEDURE — 83880 ASSAY OF NATRIURETIC PEPTIDE: CPT

## 2022-11-13 PROCEDURE — A9270 NON-COVERED ITEM OR SERVICE: HCPCS | Performed by: INTERNAL MEDICINE

## 2022-11-13 PROCEDURE — 700102 HCHG RX REV CODE 250 W/ 637 OVERRIDE(OP): Performed by: HOSPITALIST

## 2022-11-13 PROCEDURE — 85025 COMPLETE CBC W/AUTO DIFF WBC: CPT

## 2022-11-13 RX ORDER — SODIUM CHLORIDE 9 MG/ML
1000 INJECTION, SOLUTION INTRAVENOUS ONCE
Status: COMPLETED | OUTPATIENT
Start: 2022-11-13 | End: 2022-11-13

## 2022-11-13 RX ORDER — POLYETHYLENE GLYCOL 3350 17 G/17G
1 POWDER, FOR SOLUTION ORAL
Status: CANCELLED | OUTPATIENT
Start: 2022-11-13

## 2022-11-13 RX ORDER — BISACODYL 10 MG
10 SUPPOSITORY, RECTAL RECTAL
Status: CANCELLED | OUTPATIENT
Start: 2022-11-13

## 2022-11-13 RX ORDER — PROMETHAZINE HYDROCHLORIDE 25 MG/1
12.5-25 TABLET ORAL EVERY 4 HOURS PRN
Status: DISCONTINUED | OUTPATIENT
Start: 2022-11-13 | End: 2022-11-14 | Stop reason: HOSPADM

## 2022-11-13 RX ORDER — PROMETHAZINE HYDROCHLORIDE 25 MG/1
12.5-25 TABLET ORAL EVERY 4 HOURS PRN
Status: CANCELLED | OUTPATIENT
Start: 2022-11-13

## 2022-11-13 RX ORDER — LISINOPRIL 5 MG/1
5 TABLET ORAL DAILY
Status: CANCELLED | OUTPATIENT
Start: 2022-11-13

## 2022-11-13 RX ORDER — ONDANSETRON 4 MG/1
4 TABLET, ORALLY DISINTEGRATING ORAL EVERY 4 HOURS PRN
Status: DISCONTINUED | OUTPATIENT
Start: 2022-11-13 | End: 2022-11-14 | Stop reason: HOSPADM

## 2022-11-13 RX ORDER — LABETALOL HYDROCHLORIDE 5 MG/ML
10 INJECTION, SOLUTION INTRAVENOUS EVERY 4 HOURS PRN
Status: DISCONTINUED | OUTPATIENT
Start: 2022-11-13 | End: 2022-11-14 | Stop reason: HOSPADM

## 2022-11-13 RX ORDER — LISINOPRIL 5 MG/1
10 TABLET ORAL DAILY
Status: DISCONTINUED | OUTPATIENT
Start: 2022-11-14 | End: 2022-11-14 | Stop reason: HOSPADM

## 2022-11-13 RX ORDER — PROCHLORPERAZINE EDISYLATE 5 MG/ML
5-10 INJECTION INTRAMUSCULAR; INTRAVENOUS EVERY 4 HOURS PRN
Status: CANCELLED | OUTPATIENT
Start: 2022-11-13

## 2022-11-13 RX ORDER — PREDNISONE 20 MG/1
20 TABLET ORAL 2 TIMES DAILY
Status: ON HOLD | COMMUNITY
End: 2022-11-14

## 2022-11-13 RX ORDER — BISACODYL 10 MG
10 SUPPOSITORY, RECTAL RECTAL
Status: DISCONTINUED | OUTPATIENT
Start: 2022-11-13 | End: 2022-11-14 | Stop reason: HOSPADM

## 2022-11-13 RX ORDER — ONDANSETRON 2 MG/ML
4 INJECTION INTRAMUSCULAR; INTRAVENOUS ONCE
Status: COMPLETED | OUTPATIENT
Start: 2022-11-13 | End: 2022-11-13

## 2022-11-13 RX ORDER — PROMETHAZINE HYDROCHLORIDE 25 MG/1
12.5-25 SUPPOSITORY RECTAL EVERY 4 HOURS PRN
Status: CANCELLED | OUTPATIENT
Start: 2022-11-13

## 2022-11-13 RX ORDER — ATORVASTATIN CALCIUM 10 MG/1
20 TABLET, FILM COATED ORAL NIGHTLY
Status: CANCELLED | OUTPATIENT
Start: 2022-11-13

## 2022-11-13 RX ORDER — ATORVASTATIN CALCIUM 20 MG/1
20 TABLET, FILM COATED ORAL NIGHTLY
Status: DISCONTINUED | OUTPATIENT
Start: 2022-11-13 | End: 2022-11-14 | Stop reason: HOSPADM

## 2022-11-13 RX ORDER — AMOXICILLIN 250 MG
2 CAPSULE ORAL 2 TIMES DAILY
Status: CANCELLED | OUTPATIENT
Start: 2022-11-13

## 2022-11-13 RX ORDER — ONDANSETRON 2 MG/ML
4 INJECTION INTRAMUSCULAR; INTRAVENOUS EVERY 4 HOURS PRN
Status: CANCELLED | OUTPATIENT
Start: 2022-11-13

## 2022-11-13 RX ORDER — ONDANSETRON 4 MG/1
4 TABLET, ORALLY DISINTEGRATING ORAL EVERY 4 HOURS PRN
Status: CANCELLED | OUTPATIENT
Start: 2022-11-13

## 2022-11-13 RX ORDER — BUTYROSPERMUM PARKII(SHEA BUTTER), SIMMONDSIA CHINENSIS (JOJOBA) SEED OIL, ALOE BARBADENSIS LEAF EXTRACT .01; 1; 3.5 G/100G; G/100G; G/100G
2000 LIQUID TOPICAL DAILY
COMMUNITY
End: 2023-02-04

## 2022-11-13 RX ORDER — INSULIN LISPRO 100 [IU]/ML
0.2 INJECTION, SOLUTION INTRAVENOUS; SUBCUTANEOUS
Status: DISCONTINUED | OUTPATIENT
Start: 2022-11-13 | End: 2022-11-13

## 2022-11-13 RX ORDER — AMOXICILLIN 250 MG
2 CAPSULE ORAL 2 TIMES DAILY
Status: DISCONTINUED | OUTPATIENT
Start: 2022-11-13 | End: 2022-11-14 | Stop reason: HOSPADM

## 2022-11-13 RX ORDER — AZITHROMYCIN 250 MG/1
250-500 TABLET, FILM COATED ORAL DAILY
COMMUNITY
End: 2023-02-04

## 2022-11-13 RX ORDER — POLYETHYLENE GLYCOL 3350 17 G/17G
1 POWDER, FOR SOLUTION ORAL
Status: DISCONTINUED | OUTPATIENT
Start: 2022-11-13 | End: 2022-11-14 | Stop reason: HOSPADM

## 2022-11-13 RX ORDER — ONDANSETRON 2 MG/ML
4 INJECTION INTRAMUSCULAR; INTRAVENOUS EVERY 4 HOURS PRN
Status: DISCONTINUED | OUTPATIENT
Start: 2022-11-13 | End: 2022-11-14 | Stop reason: HOSPADM

## 2022-11-13 RX ORDER — LISINOPRIL 5 MG/1
5 TABLET ORAL DAILY
Status: DISCONTINUED | OUTPATIENT
Start: 2022-11-13 | End: 2022-11-13

## 2022-11-13 RX ORDER — PROMETHAZINE HYDROCHLORIDE 25 MG/1
12.5-25 SUPPOSITORY RECTAL EVERY 4 HOURS PRN
Status: DISCONTINUED | OUTPATIENT
Start: 2022-11-13 | End: 2022-11-14 | Stop reason: HOSPADM

## 2022-11-13 RX ORDER — INSULIN LISPRO 100 [IU]/ML
2-9 INJECTION, SOLUTION INTRAVENOUS; SUBCUTANEOUS
Status: DISCONTINUED | OUTPATIENT
Start: 2022-11-13 | End: 2022-11-13

## 2022-11-13 RX ORDER — LABETALOL HYDROCHLORIDE 5 MG/ML
10 INJECTION, SOLUTION INTRAVENOUS EVERY 4 HOURS PRN
Status: CANCELLED | OUTPATIENT
Start: 2022-11-13

## 2022-11-13 RX ORDER — BENZONATATE 100 MG/1
100 CAPSULE ORAL 3 TIMES DAILY PRN
Status: DISCONTINUED | OUTPATIENT
Start: 2022-11-13 | End: 2022-11-14 | Stop reason: HOSPADM

## 2022-11-13 RX ORDER — INSULIN LISPRO 100 [IU]/ML
2-9 INJECTION, SOLUTION INTRAVENOUS; SUBCUTANEOUS
Status: DISCONTINUED | OUTPATIENT
Start: 2022-11-13 | End: 2022-11-14 | Stop reason: HOSPADM

## 2022-11-13 RX ORDER — PROCHLORPERAZINE EDISYLATE 5 MG/ML
5-10 INJECTION INTRAMUSCULAR; INTRAVENOUS EVERY 4 HOURS PRN
Status: DISCONTINUED | OUTPATIENT
Start: 2022-11-13 | End: 2022-11-14 | Stop reason: HOSPADM

## 2022-11-13 RX ADMIN — SODIUM CHLORIDE 1000 ML: 9 INJECTION, SOLUTION INTRAVENOUS at 12:36

## 2022-11-13 RX ADMIN — RIVAROXABAN 10 MG: 10 TABLET, FILM COATED ORAL at 18:43

## 2022-11-13 RX ADMIN — INSULIN LISPRO 5 UNITS: 100 INJECTION, SOLUTION INTRAVENOUS; SUBCUTANEOUS at 16:23

## 2022-11-13 RX ADMIN — INSULIN GLARGINE-YFGN 15 UNITS: 100 INJECTION, SOLUTION SUBCUTANEOUS at 18:43

## 2022-11-13 RX ADMIN — INSULIN LISPRO 5 UNITS: 100 INJECTION, SOLUTION INTRAVENOUS; SUBCUTANEOUS at 21:23

## 2022-11-13 RX ADMIN — ONDANSETRON 4 MG: 2 INJECTION INTRAMUSCULAR; INTRAVENOUS at 12:37

## 2022-11-13 RX ADMIN — ACETAMINOPHEN, ASPIRIN AND CAFFEINE 1 TABLET: 250; 250; 65 TABLET, FILM COATED ORAL at 22:03

## 2022-11-13 ASSESSMENT — COGNITIVE AND FUNCTIONAL STATUS - GENERAL
DRESSING REGULAR LOWER BODY CLOTHING: A LITTLE
MOBILITY SCORE: 21
CLIMB 3 TO 5 STEPS WITH RAILING: A LITTLE
STANDING UP FROM CHAIR USING ARMS: A LITTLE
SUGGESTED CMS G CODE MODIFIER MOBILITY: CJ
WALKING IN HOSPITAL ROOM: A LITTLE
DAILY ACTIVITIY SCORE: 23
SUGGESTED CMS G CODE MODIFIER DAILY ACTIVITY: CI

## 2022-11-13 ASSESSMENT — PAIN DESCRIPTION - PAIN TYPE
TYPE: ACUTE PAIN

## 2022-11-13 ASSESSMENT — LIFESTYLE VARIABLES
ON A TYPICAL DAY WHEN YOU DRINK ALCOHOL HOW MANY DRINKS DO YOU HAVE: 3
TOTAL SCORE: 0
HOW MANY TIMES IN THE PAST YEAR HAVE YOU HAD 5 OR MORE DRINKS IN A DAY: 0
EVER HAD A DRINK FIRST THING IN THE MORNING TO STEADY YOUR NERVES TO GET RID OF A HANGOVER: NO
HAVE YOU EVER FELT YOU SHOULD CUT DOWN ON YOUR DRINKING: NO
AVERAGE NUMBER OF DAYS PER WEEK YOU HAVE A DRINK CONTAINING ALCOHOL: 1
ALCOHOL_USE: NO
HAVE PEOPLE ANNOYED YOU BY CRITICIZING YOUR DRINKING: NO
EVER FELT BAD OR GUILTY ABOUT YOUR DRINKING: NO
TOTAL SCORE: 0
TOTAL SCORE: 0
CONSUMPTION TOTAL: NEGATIVE

## 2022-11-13 ASSESSMENT — ENCOUNTER SYMPTOMS
EYE PAIN: 0
WEAKNESS: 1
DIZZINESS: 0
ABDOMINAL PAIN: 0
INSOMNIA: 0
BLURRED VISION: 0
PALPITATIONS: 0
HEADACHES: 0
COUGH: 1
CHILLS: 0
NERVOUS/ANXIOUS: 0
FEVER: 0
SHORTNESS OF BREATH: 1
NAUSEA: 0
VOMITING: 0
BACK PAIN: 0

## 2022-11-13 ASSESSMENT — FIBROSIS 4 INDEX: FIB4 SCORE: 0.51

## 2022-11-13 NOTE — ED NOTES
Pt medicated as ordered.  Pt provided w/ water after verifying w/ ERP.  Pt updated on POC including pending tests and chart review by ERP.

## 2022-11-13 NOTE — DISCHARGE PLANNING
Anticipated Discharge Disposition: Home vs    Action: ER CM chart reviewed. He is diabetic. He does not seem to be established with Endocrinology for his diabetes. ER CM pulled [employer] list Dr Viveros PAC on list only and number given.    Barriers to Discharge: Follow thru has been limited    Plan: Will cont to follow

## 2022-11-13 NOTE — ASSESSMENT & PLAN NOTE
BMI 53, with DONAL  Pt on metformin, has increased in weight.  Continue with weight management with PCP, patient would be a candidate for bariatric surgery given his diabetes and failure of lifestyle modifications.

## 2022-11-13 NOTE — ASSESSMENT & PLAN NOTE
Appears to be acute on chronic  Has been elevated since 10/2022. Was normal levels <2 on 08/2022.  Unclear cause  Monitor, repeat labs

## 2022-11-13 NOTE — ED NOTES
Med rec updated and complete, per pt   Allergies reviewed, per pt  Pt reports that he took his METFORMIN 1000MG 2 tablets last night, instead of one tablet.  Pt had RX bottles at bedside, went over RX bottles and returned RX bottles back to pt at bedside.  Pt reports that he forgets to take his LISINOPRIL 5MG, last dose was a week ago.

## 2022-11-13 NOTE — ASSESSMENT & PLAN NOTE
Diagnosed 11/10/22  Not improving at home, was immobilized due to SOB  CXR does show mild pulmonary edema.  -Admit under obs, isolation  -Borderline oxygen saturation 90% on room air in ED. PT has severe risk factor including diabetes and obesity BMI 53. He also had COVID with bacterial pneumonia very recently on 10/25/22.  - walking home oxygen evaluation

## 2022-11-13 NOTE — ED NOTES
Pt to ED via [facility] for c/o increasing weakness, polydipsia, and polyuria.    Pt states has not eaten for several days r/t not feeling well.    Pt states has been taking prescribed medications as ordered.    Pt received 200ml NS via [facility] PTA.  PIV placed in LAC, blood drawn and sent to lab.

## 2022-11-13 NOTE — ASSESSMENT & PLAN NOTE
T2DM, no home insulin, HbA1c 11.2 (03/22)  Hyperglycemia present, has had DKA in the past. He stated his PCP will not prescribe him insulin or add another medication for his diabetes.  -Monitor Accuchecks TIDAC and Bedtime  -Insulin sliding scale, as per protocol  -Diabetic diet  -Long acting insulin  -DM nursing education  -hold home metformin. Continue home atorvastatin  -check new A1c

## 2022-11-13 NOTE — ED TRIAGE NOTES
Chief Complaint   Patient presents with    Hyperglycemia     Pt BIB [facility] from home. Pt complains of polydipsia and polyuria for the past 2 days. Pt takes metformin at home for diabetes. Recently treated for RSV.

## 2022-11-13 NOTE — H&P
"Hospital Medicine History & Physical Note    Date of Service  11/13/2022    Primary Care Physician  Yeyo Mcmahan M.D.    Consultants  none    Code Status  Full Code    Chief Complaint  Chief Complaint   Patient presents with    Hyperglycemia     History of Presenting Illness  Connor Jacinto Jr. is a 37 y.o. male who presented 11/13/2022 with dyspnea after being diagnosed with RSV.  Onset 1.5 weeks, associated weakness, insomnia, unable to get up to even make his meals for 4 days, patient couldn't get up last night from bed was stuck and couldn't get to his phone in the living room and urinated on himself twice. Just had COVID 10/15/22. Finished z-pack that time.  He stated he is trying to stay hydrated at home, drank \"two cases of [employer] water.\" He denied any fevers, chills, nausea or vomiting.    I spoke with EDP, concern for patient's well being given his morbid obesity and RSV, concern for hypoxia, uncontrolled diabetes and potential respiratory failure at home.    I discussed the plan of care with patient, bedside RN, charge RN, , and pharmacy.    Review of Systems  Review of Systems   Constitutional:  Positive for malaise/fatigue. Negative for chills and fever.   HENT:  Positive for congestion. Negative for hearing loss.    Eyes:  Negative for blurred vision and pain.   Respiratory:  Positive for cough and shortness of breath.    Cardiovascular:  Negative for chest pain and palpitations.   Gastrointestinal:  Negative for abdominal pain, nausea and vomiting.   Genitourinary:  Negative for dysuria and hematuria.   Musculoskeletal:  Negative for back pain and joint pain.   Skin:  Negative for itching and rash.   Neurological:  Positive for weakness. Negative for dizziness and headaches.   Psychiatric/Behavioral:  The patient is not nervous/anxious and does not have insomnia.    All other systems reviewed and are negative.    Past Medical History   has a past medical history of Chickenpox, " Diabetes (HCC), Fatty liver, Obesity, and Sleep apnea.    Surgical History   has a past surgical history that includes knee reconstruction; hernia repair; and pr incis/drain scrotum/testis,epididym (3/17/2022).     Family History  family history includes Diabetes in his father, maternal grandmother, and mother; Heart Disease in his father; Hypertension in his father; Lung Disease in his father.   Family history reviewed with patient. There is no family history that is pertinent to the chief complaint.     Social History   reports that he has quit smoking. His smokeless tobacco use includes chew. He reports that he does not currently use alcohol. He reports current drug use. Drug: Inhaled.    Allergies  Allergies   Allergen Reactions    Morphine      Pt reports that its makes his body feel on fire        Medications  Prior to Admission Medications   Prescriptions Last Dose Informant Patient Reported? Taking?   Cholecalciferol (D3 2000) 2000 UNIT Cap > 2 days at AM Rx Bottle (For Med Information) Yes Yes   Sig: Take 1 Capsule by mouth every day.   albuterol 108 (90 Base) MCG/ACT Aero Soln inhalation aerosol 11/13/2022 at 1130 Rx Bottle (For Med Information) No No   Sig: Inhale 2 Puffs every four hours as needed for Shortness of Breath.   atorvastatin (LIPITOR) 20 MG Tab 11/12/2022 at 1900 Rx Bottle (For Med Information) Yes No   Sig: Take 1 Tablet by mouth every evening.   azithromycin (ZITHROMAX) 250 MG Tab 11/12/2022 at 1500 Rx Bottle (For Med Information) Yes Yes   Sig: Take 1-2 Tablets by mouth every day. Pt started on 11/10/2022 for 5 day course   lisinopril (PRINIVIL) 5 MG Tab > 1 week at Unknown Patient No No   Sig: Take 1 Tablet by mouth every day.   metformin (GLUCOPHAGE) 1000 MG tablet 11/13/2022 at 0500 Rx Bottle (For Med Information) Yes No   Sig: Take 1 Tablet by mouth 2 times a day with meals. Pt reports that he took 2000MG last night (11/12/2022) instead of 1000MG   predniSONE (DELTASONE) 20 MG Tab  Rx  Bottle (For Med Information) No No   Sig: Take 2 Tablets by mouth every day.   predniSONE (DELTASONE) 20 MG Tab 11/12/2022 at 1900 Rx Bottle (For Med Information) Yes Yes   Sig: Take 1 Tablet by mouth 2 times a day. Pt started on 11/11/2022 for 4 day course      Facility-Administered Medications: None       Physical Exam  Temp:  [36.8 °C (98.2 °F)] 36.8 °C (98.2 °F)  Pulse:  [68-83] 68  Resp:  [18] 18  BP: (161-178)/() 178/91  SpO2:  [90 %-95 %] 90 %  Blood Pressure: (!) 178/91   Temperature: 36.8 °C (98.2 °F)   Pulse: 68   Respiration: 18   Pulse Oximetry: 90 %       Physical Exam  Vitals and nursing note reviewed.   Constitutional:       General: He is in acute distress.      Appearance: He is obese. He is ill-appearing. He is not diaphoretic.   HENT:      Head: Normocephalic and atraumatic.      Nose: Nose normal. No congestion.   Eyes:      General: No scleral icterus.     Extraocular Movements: Extraocular movements intact.   Cardiovascular:      Rate and Rhythm: Normal rate and regular rhythm.      Pulses: Normal pulses.      Heart sounds: Normal heart sounds. No murmur heard.     Comments: Difficult to auscultate due to body habitus, distant heart sounds  Pulmonary:      Effort: Respiratory distress present.      Breath sounds: No wheezing or rales.      Comments: Difficult to auscultate due to body habitus  tachypnea  Abdominal:      General: Abdomen is flat. Bowel sounds are normal. There is no distension.      Palpations: Abdomen is soft.      Tenderness: There is no abdominal tenderness.   Musculoskeletal:         General: No swelling or tenderness. Normal range of motion.      Cervical back: Normal range of motion and neck supple.      Right lower leg: No edema.      Left lower leg: No edema.   Skin:     General: Skin is warm.      Capillary Refill: Capillary refill takes less than 2 seconds.      Coloration: Skin is not jaundiced or pale.      Findings: No erythema.   Neurological:      General:  No focal deficit present.      Mental Status: He is alert and oriented to person, place, and time. Mental status is at baseline.      Motor: No weakness.   Psychiatric:         Thought Content: Thought content normal.         Judgment: Judgment normal.      Comments: Distressed, tearful       Laboratory:  Recent Labs     11/10/22  2320 11/13/22  1214   WBC 13.1* 9.9   RBC 5.46 4.64*   HEMOGLOBIN 15.7 13.3*   HEMATOCRIT 46.4 39.2*   MCV 85.0 84.5   MCH 28.8 28.7   MCHC 33.8 33.9   RDW 39.9 39.9   PLATELETCT 355 258   MPV 9.1 9.3     Recent Labs     11/10/22  2320 11/13/22  1214   SODIUM 134* 133*   POTASSIUM 4.2 3.8   CHLORIDE 99 92*   CO2 18* 26   GLUCOSE 275* 309*   BUN 21 21   CREATININE 0.95 0.78   CALCIUM 9.6 10.7*     Recent Labs     11/10/22  2320 11/13/22  1214   ALTSGPT 101* 57*   ASTSGOT 43 26   ALKPHOSPHAT 114* 101*   TBILIRUBIN 0.8 0.4   GLUCOSE 275* 309*         Recent Labs     11/10/22  2320 11/13/22  1418   NTPROBNP 50 40         Recent Labs     11/10/22  2320   TROPONINT 11       Imaging:  DX-CHEST-PORTABLE (1 VIEW)   Final Result      1.  There is no acute cardiopulmonary process.          X-Ray:  My impression is: possible pulmonary edema present  EKG:  QTC 416ms    Assessment/Plan:  Justification for Admission Status  I anticipate this patient is appropriate for observation status at this time because patient is having dyspnea, borderline low oxygen saturation 90%. Suspecting patient is having difficulty with RSV bronchitis given his severe obesity BMI 53, which is complicating his ability to breathe.    Patient will need a  bed on MEDICINE service .  The need is secondary to dyspnea.    * RSV bronchitis- (present on admission)  Assessment & Plan  Diagnosed 11/10/22  Not improving at home, was immobilized due to SOB  CXR does show mild pulmonary edema.  -Admit under obs, isolation  -Borderline oxygen saturation 90% on room air in ED. PT has severe risk factor including diabetes and obesity BMI 53.  He also had COVID with bacterial pneumonia very recently on 10/25/22.  - walking home oxygen evaluation    Hypercalcemia- (present on admission)  Assessment & Plan  10.7  Due to dehydration  - IVF    Hyponatremia- (present on admission)  Assessment & Plan  133  Possible volume overload hyponatremia  Monitor labs    Transaminitis- (present on admission)  Assessment & Plan  Chronic  Labs appear better  Likely due to NAFLD, monitor with PCP    Hypertension- (present on admission)  Assessment & Plan  Chronic  Continue home lisinopril    Lactic acidosis- (present on admission)  Assessment & Plan  Appears to be acute on chronic  Has been elevated since 10/2022. Was normal levels <2 on 08/2022.  Unclear cause  Monitor, repeat labs    Diabetes mellitus with hyperglycemia (HCC)- (present on admission)  Assessment & Plan  T2DM, no home insulin, HbA1c 11.2 (03/22)  Hyperglycemia present, has had DKA in the past. He stated his PCP will not prescribe him insulin or add another medication for his diabetes.  -Monitor Accuchecks TIDAC and Bedtime  -Insulin sliding scale, as per protocol  -Diabetic diet  -Long acting insulin  -DM nursing education  -hold home metformin. Continue home atorvastatin  -check new A1c    Class 3 severe obesity due to excess calories without serious comorbidity with body mass index (BMI) of 50.0 to 59.9 in adult ([facility])- (present on admission)  Assessment & Plan  BMI 53, with DONAL  Pt on metformin, has increased in weight.  Continue with weight management with PCP, patient would be a candidate for bariatric surgery given his diabetes and failure of lifestyle modifications.    DONAL (obstructive sleep apnea)- (present on admission)  Assessment & Plan  Pt to use his home CPAP      VTE prophylaxis: Xarelto 10 mg daily as prophylaxis

## 2022-11-14 VITALS
HEART RATE: 71 BPM | HEIGHT: 69 IN | DIASTOLIC BLOOD PRESSURE: 88 MMHG | BODY MASS INDEX: 46.65 KG/M2 | SYSTOLIC BLOOD PRESSURE: 130 MMHG | RESPIRATION RATE: 18 BRPM | OXYGEN SATURATION: 93 % | TEMPERATURE: 97.6 F | WEIGHT: 315 LBS

## 2022-11-14 PROBLEM — E83.52 HYPERCALCEMIA: Status: RESOLVED | Noted: 2022-11-13 | Resolved: 2022-11-14

## 2022-11-14 LAB
ALBUMIN SERPL BCP-MCNC: 3.9 G/DL (ref 3.2–4.9)
BUN SERPL-MCNC: 18 MG/DL (ref 8–22)
CALCIUM SERPL-MCNC: 9 MG/DL (ref 8.4–10.2)
CHLORIDE SERPL-SCNC: 97 MMOL/L (ref 96–112)
CO2 SERPL-SCNC: 24 MMOL/L (ref 20–33)
CREAT SERPL-MCNC: 0.7 MG/DL (ref 0.5–1.4)
ERYTHROCYTE [DISTWIDTH] IN BLOOD BY AUTOMATED COUNT: 40.8 FL (ref 35.9–50)
EST. AVERAGE GLUCOSE BLD GHB EST-MCNC: 220 MG/DL
GFR SERPLBLD CREATININE-BSD FMLA CKD-EPI: 121 ML/MIN/1.73 M 2
GLUCOSE BLD STRIP.AUTO-MCNC: 217 MG/DL (ref 65–99)
GLUCOSE BLD STRIP.AUTO-MCNC: 244 MG/DL (ref 65–99)
GLUCOSE SERPL-MCNC: 216 MG/DL (ref 65–99)
HBA1C MFR BLD: 9.3 % (ref 4–5.6)
HCT VFR BLD AUTO: 38.4 % (ref 42–52)
HGB BLD-MCNC: 12.9 G/DL (ref 14–18)
LACTATE SERPL-SCNC: 2.2 MMOL/L (ref 0.5–2)
MAGNESIUM SERPL-MCNC: 1.7 MG/DL (ref 1.5–2.5)
MCH RBC QN AUTO: 28.7 PG (ref 27–33)
MCHC RBC AUTO-ENTMCNC: 33.6 G/DL (ref 33.7–35.3)
MCV RBC AUTO: 85.5 FL (ref 81.4–97.8)
PHOSPHATE SERPL-MCNC: 3.8 MG/DL (ref 2.5–4.5)
PLATELET # BLD AUTO: 243 K/UL (ref 164–446)
PMV BLD AUTO: 8.6 FL (ref 9–12.9)
POTASSIUM SERPL-SCNC: 3.6 MMOL/L (ref 3.6–5.5)
RBC # BLD AUTO: 4.49 M/UL (ref 4.7–6.1)
SODIUM SERPL-SCNC: 134 MMOL/L (ref 135–145)
WBC # BLD AUTO: 6.4 K/UL (ref 4.8–10.8)

## 2022-11-14 PROCEDURE — 85027 COMPLETE CBC AUTOMATED: CPT

## 2022-11-14 PROCEDURE — 96372 THER/PROPH/DIAG INJ SC/IM: CPT

## 2022-11-14 PROCEDURE — 36415 COLL VENOUS BLD VENIPUNCTURE: CPT

## 2022-11-14 PROCEDURE — 700102 HCHG RX REV CODE 250 W/ 637 OVERRIDE(OP): Performed by: INTERNAL MEDICINE

## 2022-11-14 PROCEDURE — 82962 GLUCOSE BLOOD TEST: CPT

## 2022-11-14 PROCEDURE — 97161 PT EVAL LOW COMPLEX 20 MIN: CPT

## 2022-11-14 PROCEDURE — 83735 ASSAY OF MAGNESIUM: CPT

## 2022-11-14 PROCEDURE — 83036 HEMOGLOBIN GLYCOSYLATED A1C: CPT

## 2022-11-14 PROCEDURE — 99217 PR OBSERVATION CARE DISCHARGE: CPT | Performed by: INTERNAL MEDICINE

## 2022-11-14 PROCEDURE — 94660 CPAP INITIATION&MGMT: CPT

## 2022-11-14 PROCEDURE — G0378 HOSPITAL OBSERVATION PER HR: HCPCS

## 2022-11-14 PROCEDURE — A9270 NON-COVERED ITEM OR SERVICE: HCPCS | Performed by: INTERNAL MEDICINE

## 2022-11-14 PROCEDURE — 80069 RENAL FUNCTION PANEL: CPT

## 2022-11-14 PROCEDURE — 83605 ASSAY OF LACTIC ACID: CPT

## 2022-11-14 RX ADMIN — INSULIN LISPRO 3 UNITS: 100 INJECTION, SOLUTION INTRAVENOUS; SUBCUTANEOUS at 12:09

## 2022-11-14 RX ADMIN — LISINOPRIL 10 MG: 5 TABLET ORAL at 05:30

## 2022-11-14 RX ADMIN — INSULIN LISPRO 3 UNITS: 100 INJECTION, SOLUTION INTRAVENOUS; SUBCUTANEOUS at 05:41

## 2022-11-14 ASSESSMENT — COGNITIVE AND FUNCTIONAL STATUS - GENERAL
CLIMB 3 TO 5 STEPS WITH RAILING: A LITTLE
MOBILITY SCORE: 23
SUGGESTED CMS G CODE MODIFIER MOBILITY: CI

## 2022-11-14 ASSESSMENT — GAIT ASSESSMENTS
GAIT LEVEL OF ASSIST: SUPERVISED
DISTANCE (FEET): 150

## 2022-11-14 NOTE — DISCHARGE SUMMARY
"Discharge Summary    CHIEF COMPLAINT ON ADMISSION  Chief Complaint   Patient presents with    Hyperglycemia       Reason for Admission  EMS     Admission Date  11/13/2022    CODE STATUS  Full Code    HPI & HOSPITAL COURSE  This is a 37 y.o. male here with RSV     37 y.o. male who presented 11/13/2022 with dyspnea after being diagnosed with RSV.  Onset 1.5 weeks, associated weakness, insomnia, unable to get up to even make his meals for 4 days, patient couldn't get up last night from bed was stuck and couldn't get to his phone in the living room and urinated on himself twice. Just had COVID 10/15/22. Finished z-pack that time.  He stated he is trying to stay hydrated at home, drank \"two cases of [employer] water.\" He denied any fevers, chills, nausea or vomiting. Diagnosed with RSV on 11/10/22 started on steroids. He reports his glucose has been >600 on the steroids, has not tried any DM meds other than metformin. I will start him on januvia as his A1c 9.2, does not need insulin at this time but if does not improve his glucose likely will. He states his previous PCP refused to prescribe anything other than metformin, he is interested in starting ozempic to help with weight loss as well. I have sent a referral to endocrinology and patient reports he has found a new PCP just hasn't had an appointment yet. Patient is satting well on room air he was treated with IVF with significant improvement in his labs and negative orthostatics. He has been up walking around the room, feels unsteady. Had PT work with the patient who recommended a cane which has been given to the patient. Patient's vital signs are stable and he is ready for discharge home. He is to return to the ER if his condition worsens.      Therefore, he is discharged in good and stable condition to home with close outpatient follow-up.    Discharge Date  11/14/2022    FOLLOW UP ITEMS POST DISCHARGE  Follow up with primary care   Referral placed for endocrinology for " help with DM management and weight loss     DISCHARGE DIAGNOSES  Principal Problem:    RSV bronchitis POA: Yes  Active Problems:    DONAL (obstructive sleep apnea) POA: Yes    Class 3 severe obesity due to excess calories without serious comorbidity with body mass index (BMI) of 50.0 to 59.9 in adult (HCC) POA: Yes    Diabetes mellitus with hyperglycemia ([facility]) POA: Yes    Lactic acidosis POA: Yes    Hypertension POA: Yes    Transaminitis POA: Yes    Hyponatremia POA: Yes  Resolved Problems:    Hypercalcemia POA: Yes      FOLLOW UP  No future appointments.  Yeyo Mcmahan M.D.  [address] [facility] [id number]  [id number]  [place] [address]  [phone]    Follow up  Follow up with primary care in 1 week      MEDICATIONS ON DISCHARGE     Medication List        START taking these medications        Instructions   SITagliptin 100 MG Tabs  Commonly known as: JANUVIA   Take 1 Tablet by mouth every day.  Dose: 100 mg            CONTINUE taking these medications        Instructions   albuterol 108 (90 Base) MCG/ACT Aers inhalation aerosol   Inhale 2 Puffs every four hours as needed for Shortness of Breath.  Dose: 2 Puff     atorvastatin 20 MG Tabs  Commonly known as: LIPITOR   Take 1 Tablet by mouth every evening.  Dose: 20 mg     azithromycin 250 MG Tabs  Commonly known as: ZITHROMAX   Take 1-2 Tablets by mouth every day. Pt started on 11/10/2022 for 5 day course  Dose: 250-500 mg     D3 2000 2000 UNIT Caps  Generic drug: Cholecalciferol   Take 1 Capsule by mouth every day.  Dose: 2,000 Units     lisinopril 5 MG Tabs  Commonly known as: PRINIVIL   Take 1 Tablet by mouth every day.  Dose: 5 mg     metformin 1000 MG tablet  Commonly known as: GLUCOPHAGE   Take 1 Tablet by mouth 2 times a day with meals. Pt reports that he took 2000MG last night (11/12/2022) instead of 1000MG  Dose: 1,000 mg            STOP taking these medications      predniSONE 20 MG Tabs  Commonly known as: DELTASONE              Allergies  Allergies   Allergen Reactions     Morphine      Pt reports that its makes his body feel on fire        DIET  Orders Placed This Encounter   Procedures    Diet Order Diet: Consistent CHO (Diabetic); Calorie modifications: (optional): 2000 kcals     Standing Status:   Standing     Number of Occurrences:   1     Order Specific Question:   Diet:     Answer:   Consistent CHO (Diabetic) [4]     Order Specific Question:   Calorie modifications: (optional)     Answer:   2000 kcals [5]       ACTIVITY  As tolerated.  Weight bearing as tolerated    CONSULTATIONS  N/A    PROCEDURES  N/A    LABORATORY  Lab Results   Component Value Date    SODIUM 134 (L) 11/14/2022    POTASSIUM 3.6 11/14/2022    CHLORIDE 97 11/14/2022    CO2 24 11/14/2022    GLUCOSE 216 (H) 11/14/2022    BUN 18 11/14/2022    CREATININE 0.70 11/14/2022        Lab Results   Component Value Date    WBC 6.4 11/14/2022    HEMOGLOBIN 12.9 (L) 11/14/2022    HEMATOCRIT 38.4 (L) 11/14/2022    PLATELETCT 243 11/14/2022        Total time of the discharge process exceeds 40 minutes.

## 2022-11-14 NOTE — CARE PLAN
"The patient is Stable - Low risk of patient condition declining or worsening    Shift Goals  Clinical Goals: Pts pain will be managed during this shift, rate pain at 2/10 or lower  Patient Goals: Use CPAP during sleep, rest comfortably    Progress made toward(s) clinical / shift goals: Administered PRN headache medication, pt reported pain relief post administration was seen sleeping using his CPAP, with calm, unlabored breathing during this shift. Inquired if pain is still bothering him this morning pt reported he is \"ok\" and does not need headache meds at this time.Pt was resting comfortably throughout the night.    Patient is not progressing towards the following goals: N/a      "

## 2022-11-14 NOTE — DISCHARGE PLANNING
Case Management Discharge Planning    Admission Date: 11/13/2022  GMLOS:    ALOS: 0    6-Clicks ADL Score: 23  6-Clicks Mobility Score: 21      Anticipated Discharge Dispo: Discharge Disposition: Discharged to home/self care (01)    DME Needed: No    Action(s) Taken: Updated Provider/Nurse on Discharge Plan    Pending PT/OT evals    Escalations Completed: PT    Medically Clear: No    Next Steps: PT/OT evals, Medical clearance    Barriers to Discharge: Medical clearance, PT/OT

## 2022-11-14 NOTE — THERAPY
Physical Therapy   Initial Evaluation     Patient Name: Connor Jacinto Jr.  Age:  37 y.o., Sex:  male  Medical Record #: [id number]  Today's Date: 11/14/2022          Assessment  Patient is 37 y.o. male who was recently admitted for RSV infection and hyperglycemia. Pt was agreeable to therapy evaluation and states at home he was unable to get off the floor and was very weak prior to admit, however, feels much better now. Pt continued to c/o dizziness and lightheadedness with upright mobility and required use of FWW initially. Pt then attempted without FWW and demonstrated with dec ABENA and veering at times, however, it appeared to improve with continued mobility. Pt was encouraged to use SPC upon d/c to home to reduce risk of falling and improve symmetrical gait mechanics. Pt was agreeable. Pt was able to demo SPV to Indep level of mobility. Pt is in no acute skilled PT needs at this time. Anticipate that the patient will have no further physical therapy needs after discharge from the hospital.    Plan    Recommend Physical Therapy for Evaluation only     DC Equipment Recommendations: (P) Single Point Cane  Discharge Recommendations: (P) Anticipate that the patient will have no further physical therapy needs after discharge from the hospital     Objective       11/14/22 1500   Initial Contact Note    Initial Contact Note Order Received and Verified, Evaluation Only - Patient Does Not Require Further Acute Physical Therapy at this Time.  However, May Benefit from Post Acute Therapy for Higher Level Functional Deficits.   Pain 0 - 10 Group   Therapist Pain Assessment Nurse Notified;0   Prior Living Situation   Housing / Facility 1 Story House   Steps Into Home 2   Steps In Home 0   Equipment Owned None   Lives with - Patient's Self Care Capacity Spouse   Prior Level of Functional Mobility   Bed Mobility Independent   Transfer Status Independent   Ambulation Independent   Distance Ambulation (Feet)   (community)    Assistive Devices Used None   Stairs Independent   History of Falls   History of Falls No   Cognition    Cognition / Consciousness WDL   Comments pleasant/cooperative   Passive ROM Lower Body   Passive ROM Lower Body WDL   Active ROM Lower Body    Active ROM Lower Body  WDL   Strength Lower Body   Lower Body Strength  WDL   Sensation Lower Body   Lower Extremity Sensation   WDL   Lower Body Muscle Tone   Lower Body Muscle Tone  WDL   Strength Upper Body   Upper Body Strength  WDL   Upper Body Muscle Tone   Upper Body Muscle Tone  WDL   Neurological Concerns   Neurological Concerns No   Coordination Upper Body   Coordination WDL   Coordination Lower Body    Coordination Lower Body  WDL   Balance Assessment   Sitting Balance (Static) Good   Sitting Balance (Dynamic) Good   Standing Balance (Static) Good   Standing Balance (Dynamic) Fair +   Weight Shift Sitting Good   Weight Shift Standing Good   Comments w/fww initially, able to ambulate without AD, however, veering, encouraged to use SPC, pt was agreeable   Gait Analysis   Gait Level Of Assist Supervised   Assistive Device None   Distance (Feet) 150   # of Times Distance was Traveled 1   Weight Bearing Status fwb   Bed Mobility    Supine to Sit Independent   Sit to Supine Independent   Scooting Independent   Rolling Independent   Functional Mobility   Sit to Stand Supervised   Bed, Chair, Wheelchair Transfer Supervised   Transfer Method Stand Step   Mobility FWW at times   How much difficulty does the patient currently have...   Turning over in bed (including adjusting bedclothes, sheets and blankets)? 4   Sitting down on and standing up from a chair with arms (e.g., wheelchair, bedside commode, etc.) 4   Moving from lying on back to sitting on the side of the bed? 4   How much help from another person does the patient currently need...   Moving to and from a bed to a chair (including a wheelchair)? 4   Need to walk in a hospital room? 4   Climbing 3-5 steps with  a railing? 3   6 clicks Mobility Score 23   Activity Tolerance   Sitting in Chair NT   Sitting Edge of Bed 5 mins   Standing 10 mins   Comments limited by dizziness   Edema / Skin Assessment   Edema / Skin  Not Assessed   Education Group   Education Provided Role of Physical Therapist   Role of Physical Therapist Patient Response Patient;Acceptance;Explanation;Demonstration;Verbal Demonstration;Action Demonstration   Anticipated Discharge Equipment and Recommendations   DC Equipment Recommendations Single Point Cane   Discharge Recommendations Anticipate that the patient will have no further physical therapy needs after discharge from the hospital   Interdisciplinary Plan of Care Collaboration   IDT Collaboration with  Nursing   Patient Position at End of Therapy In Bed;Tray Table within Reach;Call Light within Reach;Phone within Reach   Collaboration Comments aware of visit and recs

## 2022-11-14 NOTE — PROGRESS NOTES
CHIEF COMPLAINT  Chief Complaint   Patient presents with    Hyperglycemia       HPI  Connor Jacinto Jr. is a 37 y.o. male who presents to the emergency department complaining that he has an RSV infection and his diabetes is out of control.  The patient has had fever cough weakness dizziness he feels short of breath he says that last night he felt extremely sick and thought he was going to die he thinks that he may have briefly passed out.  The patient says that his blood sugar was greater than 600 last night he does not check his ketones.  He has been extremely thirsty and says he is drinking a lot of water.    REVIEW OF SYSTEMS no hemoptysis no black or bloody stool or emesis no chest pain.  All other systems negative    PAST MEDICAL HISTORY  Past Medical History:   Diagnosis Date    Chickenpox     Diabetes (HCC)     Fatty liver     Obesity     Sleep apnea        FAMILY HISTORY  Family History   Problem Relation Age of Onset    Diabetes Mother     Heart Disease Father         55    Hypertension Father     Diabetes Father     Lung Disease Father         mesothelioma    Diabetes Maternal Grandmother     Sleep Apnea Neg Hx        SOCIAL HISTORY  Social History     Socioeconomic History    Marital status:    Tobacco Use    Smoking status: Former    Smokeless tobacco: Current     Types: Chew    Tobacco comments:     vape   Vaping Use    Vaping Use: Some days    Substances: Nicotine, THC, CBD   Substance and Sexual Activity    Alcohol use: Not Currently     Comment: 3-4 times amonth    Drug use: Yes     Types: Inhaled     Comment: THC    Sexual activity: Yes     Partners: Female     Comment: , works as   at [employer]       SURGICAL HISTORY  Past Surgical History:   Procedure Laterality Date    [place] INCIS/DRAIN SCROTUM/TESTIS,EPIDIDYM  3/17/2022    Procedure: INCISION AND DRAINAGE, SCROTUM;  Surgeon: James Adams M.D.;  Location: SURGERY [facility];  Service: Urology    HERNIA REPAIR      KNEE  "RECONSTRUCTION         CURRENT MEDICATIONS  Home Medications       Reviewed by Michele Alan (Pharmacy Tech) on 11/13/22 at 1229  Med List Status: Complete     Medication Last Dose Status   albuterol 108 (90 Base) MCG/ACT Aero Soln inhalation aerosol 11/13/2022 Active   atorvastatin (LIPITOR) 20 MG Tab 11/12/2022 Active   azithromycin (ZITHROMAX) 250 MG Tab 11/12/2022 Active   Cholecalciferol (D3 2000) 2000 UNIT Cap > 2 days Active   lisinopril (PRINIVIL) 5 MG Tab > 1 week Active   metformin (GLUCOPHAGE) 1000 MG tablet 11/13/2022 Active   predniSONE (DELTASONE) 20 MG Tab  Active   predniSONE (DELTASONE) 20 MG Tab 11/12/2022 Active                    ALLERGIES  Allergies   Allergen Reactions    Morphine      Pt reports that its makes his body feel on fire        PHYSICAL EXAM  VITAL SIGNS: BP (!) 150/80   Pulse 78   Temp 36.8 °C (98.2 °F) (Temporal)   Resp 20   Ht 1.753 m (5' 9.02\")   Wt (!) 167 kg (368 lb 2.7 oz)   SpO2 94%   BMI 54.34 kg/m²    Oxygen saturation is interpreted as adequate  Constitutional: Awake verbal ill-appearing  Eyes: No erythema discharge or jaundice  Neck: Trachea midline no JVD  Cardiovascular: Regular rate and rhythm  Lungs: Clear bilaterally but he does have a coarse frequent cough  Abdomen/Back: Severely morbidly obese no peritoneal findings  Skin: Warm and dry  Musculoskeletal: No acute bony deformity or leg edema  Neurologic: Awake verbal and moving all extremities    CHART REVIEW  I reviewed the ER chart from his last visit on November 10 now 3 days ago and he was diagnosed with RSV and there was some concern regarding possible COPD or reactive airway disease.  The patient was treated and released with prescriptions for albuterol and prednisone which he says he has been taking.    Laboratory  Tonight CBC shows white blood cell count of 9.9 hemoglobin is adequate at 13.3 platelet count is 258, complete metabolic panel shows an elevated glucose of 309 the anion gap is " normal at 15 the bicarb is normal at 26 serum ketones are within the normal range there were slight elevations of the liver functions with AST of 57 and alk phos of 101 lactic acid level is elevated at 3.1    Radiology  DX-CHEST-PORTABLE (1 VIEW)   Final Result      1.  There is no acute cardiopulmonary process.            EKG interpretation  Because the patient reported a syncopal or near syncopal episode last night an EKG was obtained and this is a twelve-lead EKG showing sinus rhythm 69 bpm no pathologic ST elevation or depression or ectopy VA interval is 196 ms QTc interval 416 ms there is no ectopy      MEDICAL DECISION MAKING and DISPOSITION  In the emergency department an IV was established patient was placed on the cardiac monitor and started on intravenous fluids due to concern for possible DKA.  Laboratory testing however shows the patient is not in DKA at this time.  I reviewed all the findings in detail with the patient he states that he is much too ill to go home.  The patient does have an elevated lactic acid level and glucose and he is going to need additional intravenous fluids and supportive care therefore I have reviewed the case with the hospitalist the patient is referred to the hospitalist service for further evaluation and treatment    IMPRESSION  1.  RSV infection  2.  History of diabetes with current hyperglycemia  3.  Morbid obesity  4.  Elevated lactic acid level         Electronically signed by: Jethro Rollins M.D., 11/13/2022 6:16 PM

## 2022-11-14 NOTE — DIETARY
Nutrition Services:    MST of 2 noted on Nutrition Admit Screen due to report of wt loss of 2-13 lb x 6 months and poor PO PTA.     Pt is currently on a consistent CHO diabetic diet with 2000 kcal limit and per chart pt PO % x 2 meals. Current PO intake is adequate. Per H&P, pt reported not being able to get up and prepare meals x 4 days. Poor PO x 4 days will not meet ASPEN criteria for malnutrition.    Ht: 175.3 cm, Wt: 167 kg (estimate), BMI 54.34 (class 3 extreme obesity). Pt's wt this hospitalization is an estimate. It is actually higher than prior weights. Pt's prior wt taken on a stand up scale on 3/22/22 was 158 kg and BMI was 51.6. Pt most likely is not at nutrition risk because current PO intake here in the hospital is adequate, length of time w/ poor PO PTA was not for very long, and pt has BMI >40.     Pt with BMI >40 (=Body mass index is 54.34 kg/m².), Class III obesity. Weight loss counseling not appropriate in acute care setting.     RECOMMEND - If appropriate at [place] please refer to outpatient nutrition services for weight management.      Consult RD as needed. RD will re-screen weekly.      RD available prn

## 2022-11-14 NOTE — PROGRESS NOTES
Received bedside report from day shift RN at 19:15.   Assumed pt care.   Pt seen AO4, in room air.   No SOB and nausea reported.    Pain reported at 5/10,  no pain meds  on MAR, hospitalist informed.   Waiting for orders.  POC discussed.   Safety and fall precautions in place.   Instructed pt to use call light, verbalized understanding.   Call light kept within reach.  No other needs at this time.   Will continue to monitor.

## 2022-11-14 NOTE — CARE PLAN
The patient is Stable - Low risk of patient condition declining or worsening    Shift Goals  Clinical Goals: Control pt's finger stick blood sugar  Patient Goals: Discuss POC over phone    Progress made toward(s) clinical / shift goals:  Met    Patient is not progressing towards the following goals:

## 2022-11-14 NOTE — PROGRESS NOTES
4 Eyes Skin Assessment Completed by ANIRUDH Gipson and ANIRUDH Simpson.    Head WDL  Ears Redness and Blanching  Nose WDL  Mouth WDL  Neck WDL  Breast/Chest Redness and Blanching  Shoulder Blades WDL  Spine WDL  (R) Arm/Elbow/Hand WDL  (L) Arm/Elbow/Hand WDL  Abdomen WDL  Groin WDL  Scrotum/Coccyx/Buttocks Discoloration pin point bruising from prior surgery per pt. Purple discoloration around sacral area from sacral fracture.  (R) Leg WDL  (L) Leg WDL  (R) Heel/Foot/Toe WDL  (L) Heel/Foot/Toe WDL          Devices In Places Pulse Ox      Interventions In Place Pressure Redistribution Mattress    Possible Skin Injury No    Pictures Uploaded Into Epic N/A  Wound Consult Placed N/A  RN Wound Prevention Protocol Ordered No

## 2022-11-18 NOTE — ED PROVIDER NOTES
ED Provider Note    CHIEF COMPLAINT  Chief Complaint   Patient presents with    Hyperglycemia       HPI  Connor Jacinto Jr. is a 37 y.o. male who presents to the emergency department stating that he has an RSV infection and he is feeling very ill and his blood sugar is out of control measuring greater than 600 on the evening before coming in.  The patient was recently diagnosed with RSV and thought to have reactive airway disease and was started on oral prednisone and bronchodilators.  The patient also says that last night he became very dizzy and lightheaded and either passed out or nearly passed out and thought that he might die.  The patient says he has been extremely thirsty and is drinking a lot of water.    REVIEW OF SYSTEMS no hemoptysis no midsternal chest pain.  All other systems negative    PAST MEDICAL HISTORY  Past Medical History:   Diagnosis Date    Chickenpox     Diabetes (HCC)     Fatty liver     Obesity     Sleep apnea        FAMILY HISTORY  Family History   Problem Relation Age of Onset    Diabetes Mother     Heart Disease Father         55    Hypertension Father     Diabetes Father     Lung Disease Father         mesothelioma    Diabetes Maternal Grandmother     Sleep Apnea Neg Hx        SOCIAL HISTORY  Social History     Socioeconomic History    Marital status:    Tobacco Use    Smoking status: Former    Smokeless tobacco: Current     Types: Chew    Tobacco comments:     vape   Vaping Use    Vaping Use: Some days    Substances: Nicotine, THC, CBD   Substance and Sexual Activity    Alcohol use: Not Currently     Comment: 3-4 times amonth    Drug use: Yes     Types: Inhaled     Comment: THC    Sexual activity: Yes     Partners: Female     Comment: , works as   at [employer]       SURGICAL HISTORY  Past Surgical History:   Procedure Laterality Date    [place] INCIS/DRAIN SCROTUM/TESTIS,EPIDIDYM  3/17/2022    Procedure: INCISION AND DRAINAGE, SCROTUM;  Surgeon: James Adams M.D.;   "Location: SURGERY [facility];  Service: Urology    HERNIA REPAIR      KNEE RECONSTRUCTION         CURRENT MEDICATIONS  Home Medications       Reviewed by Michele Alan (Pharmacy Tech) on 11/13/22 at 1229  Med List Status: Complete     Medication Last Dose Status   albuterol 108 (90 Base) MCG/ACT Aero Soln inhalation aerosol 11/13/2022 Active   atorvastatin (LIPITOR) 20 MG Tab 11/12/2022 Active   azithromycin (ZITHROMAX) 250 MG Tab 11/12/2022 Active   Cholecalciferol (D3 2000) 2000 UNIT Cap > 2 days Active   lisinopril (PRINIVIL) 5 MG Tab > 1 week Active   metformin (GLUCOPHAGE) 1000 MG tablet 11/13/2022 Active   predniSONE (DELTASONE) 20 MG Tab  Active   predniSONE (DELTASONE) 20 MG Tab 11/12/2022 Active                    ALLERGIES  Allergies   Allergen Reactions    Morphine      Pt reports that its makes his body feel on fire        PHYSICAL EXAM  VITAL SIGNS: /88   Pulse 71   Temp 36.4 °C (97.6 °F) (Oral)   Resp 18   Ht 1.753 m (5' 9.02\")   Wt (!) 167 kg (368 lb 2.7 oz)   SpO2 93%   BMI 54.34 kg/m²    Oxygen saturation is interpreted as borderline for hypoxia with a low reading in the ER of 90% on room air  Constitutional: Awake verbal ill-appearing  HENT: Mucous membranes appear dry  Eyes: No erythema discharge or jaundice  Neck: Trachea midline no JVD no meningeal findings  Cardiovascular: Regular rate and rhythm  Lungs: Essentially clear bilaterally  Abdomen/Back: Severely morbidly obese no peritoneal findings  Skin: Warm and dry  Musculoskeletal: No leg edema or calf tenderness  Neurologic: Awake verbal moving all extremities    Laboratory  CBC shows white blood cell count of 9.9 hemoglobin is adequate at 13.3, blood sugar is slightly elevated at 309 there were very minimal elevations of the ALT and alk phos lactic acid level is elevated at 3.1 bicarb is 26 anion gap is 15 and serum ketones are negative.    Radiology  DX-CHEST-PORTABLE (1 VIEW)   Final Result      1.  There is no " acute cardiopulmonary process.          MEDICAL DECISION MAKING and DISPOSITION  In the emergency department an IV was established and due to concerns for hyperglycemia and differential diagnosis including possible DKA the patient was started on intravenous fluids.  He was kept on the cardiac monitor.  I reviewed all the findings with the patient and at this point in time he tells me that he is far too ill to go home.  The patient does have a lactic acidosis and reports that his blood sugars have been greater than 600 at home.  I have reviewed the case with the hospitalist the patient was referred to the hospitalist service for further evaluation and treatment    IMPRESSION  1.  RSV infection  2.  History of diabetes with current hyperglycemia  3.  Borderline oxygen saturation on room air  4.  Elevated lactic acid level  5.  Morbid obesity         Electronically signed by: Jethro Rollins M.D., 11/17/2022 5:26 PM

## 2023-02-04 ENCOUNTER — APPOINTMENT (OUTPATIENT)
Dept: RADIOLOGY | Facility: MEDICAL CENTER | Age: 38
End: 2023-02-04
Attending: EMERGENCY MEDICINE
Payer: COMMERCIAL

## 2023-02-04 ENCOUNTER — HOSPITAL ENCOUNTER (EMERGENCY)
Facility: MEDICAL CENTER | Age: 38
End: 2023-02-04
Attending: EMERGENCY MEDICINE
Payer: COMMERCIAL

## 2023-02-04 VITALS
SYSTOLIC BLOOD PRESSURE: 144 MMHG | BODY MASS INDEX: 46.65 KG/M2 | HEART RATE: 87 BPM | DIASTOLIC BLOOD PRESSURE: 82 MMHG | WEIGHT: 315 LBS | TEMPERATURE: 97 F | RESPIRATION RATE: 18 BRPM | HEIGHT: 69 IN | OXYGEN SATURATION: 96 %

## 2023-02-04 DIAGNOSIS — M79.605 PAIN OF LEFT LOWER EXTREMITY: ICD-10-CM

## 2023-02-04 LAB
ANION GAP SERPL CALC-SCNC: 12 MMOL/L (ref 7–16)
BASOPHILS # BLD AUTO: 0.6 % (ref 0–1.8)
BASOPHILS # BLD: 0.08 K/UL (ref 0–0.12)
BUN SERPL-MCNC: 5 MG/DL (ref 8–22)
CALCIUM SERPL-MCNC: 8.4 MG/DL (ref 8.4–10.2)
CHLORIDE SERPL-SCNC: 104 MMOL/L (ref 96–112)
CO2 SERPL-SCNC: 14 MMOL/L (ref 20–33)
CREAT SERPL-MCNC: 0.66 MG/DL (ref 0.5–1.4)
EOSINOPHIL # BLD AUTO: 0.11 K/UL (ref 0–0.51)
EOSINOPHIL NFR BLD: 0.9 % (ref 0–6.9)
ERYTHROCYTE [DISTWIDTH] IN BLOOD BY AUTOMATED COUNT: 42.6 FL (ref 35.9–50)
GFR SERPLBLD CREATININE-BSD FMLA CKD-EPI: 123 ML/MIN/1.73 M 2
GLUCOSE SERPL-MCNC: 95 MG/DL (ref 65–99)
HCT VFR BLD AUTO: 47.9 % (ref 42–52)
HGB BLD-MCNC: 16 G/DL (ref 14–18)
IMM GRANULOCYTES # BLD AUTO: 0.2 K/UL (ref 0–0.11)
IMM GRANULOCYTES NFR BLD AUTO: 1.6 % (ref 0–0.9)
LACTATE SERPL-SCNC: 1.9 MMOL/L (ref 0.5–2)
LYMPHOCYTES # BLD AUTO: 2.09 K/UL (ref 1–4.8)
LYMPHOCYTES NFR BLD: 16.5 % (ref 22–41)
MCH RBC QN AUTO: 28.5 PG (ref 27–33)
MCHC RBC AUTO-ENTMCNC: 33.4 G/DL (ref 33.7–35.3)
MCV RBC AUTO: 85.4 FL (ref 81.4–97.8)
MONOCYTES # BLD AUTO: 0.83 K/UL (ref 0–0.85)
MONOCYTES NFR BLD AUTO: 6.6 % (ref 0–13.4)
NEUTROPHILS # BLD AUTO: 9.35 K/UL (ref 1.82–7.42)
NEUTROPHILS NFR BLD: 73.8 % (ref 44–72)
NRBC # BLD AUTO: 0 K/UL
NRBC BLD-RTO: 0 /100 WBC
PLATELET # BLD AUTO: 300 K/UL (ref 164–446)
PMV BLD AUTO: 8.8 FL (ref 9–12.9)
POTASSIUM SERPL-SCNC: 4.3 MMOL/L (ref 3.6–5.5)
RBC # BLD AUTO: 5.61 M/UL (ref 4.7–6.1)
SODIUM SERPL-SCNC: 130 MMOL/L (ref 135–145)
WBC # BLD AUTO: 12.7 K/UL (ref 4.8–10.8)

## 2023-02-04 PROCEDURE — 700117 HCHG RX CONTRAST REV CODE 255: Performed by: EMERGENCY MEDICINE

## 2023-02-04 PROCEDURE — 96375 TX/PRO/DX INJ NEW DRUG ADDON: CPT

## 2023-02-04 PROCEDURE — 99284 EMERGENCY DEPT VISIT MOD MDM: CPT

## 2023-02-04 PROCEDURE — 700102 HCHG RX REV CODE 250 W/ 637 OVERRIDE(OP): Performed by: EMERGENCY MEDICINE

## 2023-02-04 PROCEDURE — 700111 HCHG RX REV CODE 636 W/ 250 OVERRIDE (IP): Performed by: EMERGENCY MEDICINE

## 2023-02-04 PROCEDURE — 80048 BASIC METABOLIC PNL TOTAL CA: CPT

## 2023-02-04 PROCEDURE — 36415 COLL VENOUS BLD VENIPUNCTURE: CPT

## 2023-02-04 PROCEDURE — 96374 THER/PROPH/DIAG INJ IV PUSH: CPT

## 2023-02-04 PROCEDURE — 85025 COMPLETE CBC W/AUTO DIFF WBC: CPT

## 2023-02-04 PROCEDURE — 83605 ASSAY OF LACTIC ACID: CPT

## 2023-02-04 PROCEDURE — 87040 BLOOD CULTURE FOR BACTERIA: CPT

## 2023-02-04 PROCEDURE — A9270 NON-COVERED ITEM OR SERVICE: HCPCS | Performed by: EMERGENCY MEDICINE

## 2023-02-04 PROCEDURE — 73701 CT LOWER EXTREMITY W/DYE: CPT | Mod: LT

## 2023-02-04 RX ORDER — SILDENAFIL 100 MG/1
100 TABLET, FILM COATED ORAL
COMMUNITY
Start: 2022-12-06

## 2023-02-04 RX ORDER — SULFAMETHOXAZOLE AND TRIMETHOPRIM 800; 160 MG/1; MG/1
1 TABLET ORAL ONCE
Status: COMPLETED | OUTPATIENT
Start: 2023-02-04 | End: 2023-02-04

## 2023-02-04 RX ORDER — KETOROLAC TROMETHAMINE 30 MG/ML
15 INJECTION, SOLUTION INTRAMUSCULAR; INTRAVENOUS ONCE
Status: COMPLETED | OUTPATIENT
Start: 2023-02-04 | End: 2023-02-04

## 2023-02-04 RX ORDER — SULFAMETHOXAZOLE AND TRIMETHOPRIM 800; 160 MG/1; MG/1
1 TABLET ORAL 2 TIMES DAILY
Qty: 14 TABLET | Refills: 0 | Status: SHIPPED | OUTPATIENT
Start: 2023-02-04 | End: 2023-02-11

## 2023-02-04 RX ORDER — AMOXICILLIN AND CLAVULANATE POTASSIUM 875; 125 MG/1; MG/1
1 TABLET, FILM COATED ORAL 2 TIMES DAILY
Qty: 14 TABLET | Refills: 0 | Status: SHIPPED | OUTPATIENT
Start: 2023-02-04 | End: 2023-02-11

## 2023-02-04 RX ORDER — HYDROMORPHONE HYDROCHLORIDE 1 MG/ML
0.5 INJECTION, SOLUTION INTRAMUSCULAR; INTRAVENOUS; SUBCUTANEOUS ONCE
Status: COMPLETED | OUTPATIENT
Start: 2023-02-04 | End: 2023-02-04

## 2023-02-04 RX ORDER — TRAZODONE HYDROCHLORIDE 50 MG/1
100-150 TABLET ORAL NIGHTLY PRN
COMMUNITY
Start: 2022-12-12

## 2023-02-04 RX ORDER — TESTOSTERONE CYPIONATE 200 MG/ML
50 INJECTION, SOLUTION INTRAMUSCULAR
COMMUNITY

## 2023-02-04 RX ORDER — IBUPROFEN 600 MG/1
600 TABLET ORAL
Qty: 20 TABLET | Refills: 0 | Status: SHIPPED | OUTPATIENT
Start: 2023-02-04

## 2023-02-04 RX ORDER — SEMAGLUTIDE 1.34 MG/ML
1 INJECTION, SOLUTION SUBCUTANEOUS
COMMUNITY
Start: 2023-01-25

## 2023-02-04 RX ORDER — AMOXICILLIN AND CLAVULANATE POTASSIUM 875; 125 MG/1; MG/1
1 TABLET, FILM COATED ORAL ONCE
Status: COMPLETED | OUTPATIENT
Start: 2023-02-04 | End: 2023-02-04

## 2023-02-04 RX ORDER — ONDANSETRON 2 MG/ML
4 INJECTION INTRAMUSCULAR; INTRAVENOUS ONCE
Status: COMPLETED | OUTPATIENT
Start: 2023-02-04 | End: 2023-02-04

## 2023-02-04 RX ADMIN — ONDANSETRON 4 MG: 2 INJECTION INTRAMUSCULAR; INTRAVENOUS at 13:21

## 2023-02-04 RX ADMIN — KETOROLAC TROMETHAMINE 15 MG: 30 INJECTION, SOLUTION INTRAMUSCULAR; INTRAVENOUS at 13:48

## 2023-02-04 RX ADMIN — AMOXICILLIN AND CLAVULANATE POTASSIUM 1 TABLET: 875; 125 TABLET, FILM COATED ORAL at 17:11

## 2023-02-04 RX ADMIN — HYDROMORPHONE HYDROCHLORIDE 0.5 MG: 1 INJECTION, SOLUTION INTRAMUSCULAR; INTRAVENOUS; SUBCUTANEOUS at 13:21

## 2023-02-04 RX ADMIN — IOHEXOL 100 ML: 350 INJECTION, SOLUTION INTRAVENOUS at 15:15

## 2023-02-04 RX ADMIN — SULFAMETHOXAZOLE AND TRIMETHOPRIM 1 TABLET: 800; 160 TABLET ORAL at 17:11

## 2023-02-04 ASSESSMENT — LIFESTYLE VARIABLES
HAVE YOU EVER FELT YOU SHOULD CUT DOWN ON YOUR DRINKING: NO
DO YOU DRINK ALCOHOL: YES

## 2023-02-04 ASSESSMENT — FIBROSIS 4 INDEX: FIB4 SCORE: 0.54

## 2023-02-04 NOTE — ED NOTES
Pat amb to rm#6; c/o Leg Pain (Reports he has been self injecting testosterone to L upper leg and having pain to this area x 3 days. Denies fevers or chills.) as per triage RN

## 2023-02-04 NOTE — ED PROVIDER NOTES
"ED Provider Note    CHIEF COMPLAINT  Chief Complaint   Patient presents with    Leg Pain     Reports he has been self injecting testosterone to L upper leg and having pain to this area x 3 days. Denies fevers or chills.           HPI/ROS  LIMITATION TO HISTORY   Select: : None      Connor Jacinto Jr. is a 37 y.o. male who presents with a chief complaint to me of \"I think I fucked up.\"  He is referring to injecting himself with a possibly contaminated multidose vial of testosterone.  He was prescribed this by his doctor for low testosterone.  He last gave himself a dose 3 days ago.  2 days ago he started having pain around the injection site.  It got progressively worse.  Today he is barely able to walk because it hurts so bad.  He left work and instead of going home came here for further evaluation.  He denies any fever.  No rashes.  Feels like his leg is on fire, from the lower thigh to the upper thigh.  No belly pain.  No swelling.  He has not noticed a rash.  There is no other complaint.    PAST MEDICAL HISTORY   has a past medical history of Chickenpox, Diabetes (HCC), Fatty liver, Obesity, and Sleep apnea.    SURGICAL HISTORY   has a past surgical history that includes knee reconstruction; hernia repair; and incis/drain scrotum/testis,epididym (3/17/2022).    FAMILY HISTORY  Family History   Problem Relation Age of Onset    Diabetes Mother     Heart Disease Father         55    Hypertension Father     Diabetes Father     Lung Disease Father         mesothelioma    Diabetes Maternal Grandmother     Sleep Apnea Neg Hx        SOCIAL HISTORY  Social History     Tobacco Use    Smoking status: Former    Smokeless tobacco: Current     Types: Chew    Tobacco comments:     vape   Vaping Use    Vaping Use: Former    Substances: Nicotine, THC, CBD   Substance and Sexual Activity    Alcohol use: Not Currently     Comment: 3-4 times amonth    Drug use: Not Currently     Types: Inhaled     Comment: THC    Sexual activity: " "Yes     Partners: Female     Comment: , works as   at [employer]       CURRENT MEDICATIONS  Home Medications       Reviewed by Rylee Duenas R.N. (Registered Nurse) on 02/04/23 at 1220  Med List Status: Not Addressed     Medication Last Dose Status   albuterol 108 (90 Base) MCG/ACT Aero Soln inhalation aerosol  Active   atorvastatin (LIPITOR) 20 MG Tab  Active   azithromycin (ZITHROMAX) 250 MG Tab  Active   Cholecalciferol (D3 2000) 2000 UNIT Cap  Active   lisinopril (PRINIVIL) 5 MG Tab  Active   metformin (GLUCOPHAGE) 1000 MG tablet  Active   SITagliptin (JANUVIA) 100 MG Tab  Active                    ALLERGIES  Allergies   Allergen Reactions    Morphine      Pt reports that its makes his body feel on fire        PHYSICAL EXAM  VITAL SIGNS: BP (!) 170/100   Pulse 99   Temp 36.3 °C (97.3 °F) (Temporal)   Resp (!) 22   Ht 1.753 m (5' 9\")   Wt (!) 156 kg (343 lb 11.2 oz)   SpO2 95%   BMI 50.76 kg/m²    Constitutional: Although he does appear uncomfortable from pain he is not toxic nor ill in appearance.  HENT: Head is without trauma.  Oropharynx is clear.  Mucous membranes are moist.  Eyes: Sclerae are nonicteric, pupils are equally round.  Neck: Supple with grossly normal range of motion.  Lymph: There is no inguinal lymphadenopathy appreciated.  I do not see lymphangitic tracking.  Cardiovascular: Heart is regular rate and rhythm without murmur rub or gallop.  Peripheral pulses are intact and symmetric throughout.  Thorax & Lungs: Breathing easily.  Good air movement.  There is no wheeze, rhonchi or rales.  Abdomen: Bowel sounds normal, soft, non-distended, nontender, no mass nor pulsatile mass. do not appreciate hepatosplenomegaly.  Skin: No apparent rash.  I do not see petechiae or purpura.  On his left mid anterior thigh, there is a small lesion consistent with a needle injection.  He is exquisitely tender to light touch here and throughout the anterior thigh.  Suggestion of some fullness " "around that injection site, but no obvious abscess.  The skin is not erythematous, hot.  I do not appreciate edema.  Extremities: Otherwise, No evidence of acute trauma.  No clubbing, cyanosis, edema, no Homans or cords.  Neurologic: Alert. Moving all extremities.  Intact strength throughout.  Gait is normal.  Psychiatric: Normal for situation      DIAGNOSTIC STUDIES / PROCEDURES    LABS  Labs Reviewed   CBC WITH DIFFERENTIAL - Abnormal; Notable for the following components:       Result Value    WBC 12.7 (*)     MCHC 33.4 (*)     MPV 8.8 (*)     Neutrophils-Polys 73.80 (*)     Lymphocytes 16.50 (*)     Immature Granulocytes 1.60 (*)     Neutrophils (Absolute) 9.35 (*)     Immature Granulocytes (abs) 0.20 (*)     All other components within normal limits   BASIC METABOLIC PANEL - Abnormal; Notable for the following components:    Sodium 130 (*)     Co2 14 (*)     Bun 5 (*)     All other components within normal limits   LACTIC ACID   ESTIMATED GFR   BLOOD CULTURE    Narrative:     From different peripheral sites, if not done within the last  24 hours (Per Hospital Policy: Only change specimen source to  \"Line\" if specified by physician order)   BLOOD CULTURE    Narrative:     From different peripheral sites, if not done within the last  24 hours (Per Hospital Policy: Only change specimen source to  \"Line\" if specified by physician order)   LACTIC ACID         RADIOLOGY  I have independently interpreted the diagnostic imaging associated with this visit and am waiting the final reading from the radiologist.   My preliminary interpretation is a follows: No stranding  Radiologist interpretation:   CT-EXTREMITY, LOWER WITH LEFT   Final Result      Fusiform thickening of the distal rectus femoris muscle belly with central low density. This could indicate subacute hematoma. Superimposed abscess cannot be excluded but is not optimally particularly likely given only mild fat stranding in the region.    Could also consider " late subacute-remote muscular tear.      No acute bony abnormality is detected      Prior anterior cruciate ligament graft repair            COURSE & MEDICAL DECISION MAKING    ED Observation Status? Yes; I am placing the patient in to an observation status due to a diagnostic uncertainty as well as therapeutic intensity. Patient placed in observation status at 1:09 PM, 2/4/2023.     Observation plan is as follows: We will be getting laboratories, CT scan.  We will follow on the results of this and see how he responds to analgesia.    Upon Reevaluation, the patient's condition has: Improved; and will be discharged.    Patient discharged from ED Observation status at 1716 (Time) 2/4/2023 (Date).     INITIAL ASSESSMENT, COURSE AND PLAN  Care Narrative: This is a patient presents after self-administered injection with a concern for contaminated multidose vial given 3 days ago now with 2 days of pain in that area progressively worse.  This does not look like an obvious abscess nor necrotizing fasciitis.  However his pain appears to be out of proportion to his exam.  I do worry about myositis.  We will check imaging, laboratories.  We will give him analgesia.    Recheck at 1330: Still hurting after Dilaudid.  He is asked for ketorolac which we discussed before.  This is ordered.    Recheck 1715: Patient is feeling remarkably better after the Toradol.  No tenderness at all, able to get up and ambulate no difficulty.  He declines crutches.  Laboratory show a leukocytosis but no bandemia.  Basic chemistries are unremarkable, including his blood sugar.  CT scan returns the above results.  Subcu hematoma versus abscess.  However, there is no significant fat stranding to suggest infection.  I showed the patient his films we went through the differential at the bedside.  Based on his clinical exam, I do not think that we should incise into this.  However, at this point, out of an abundance of caution, I would like to treat him  with antibiotics.  We will put him on Bactrim and Augmentin.  He was given Augmentin precautions.  I cautioned rest the next 24 to 36 hours.  I would like him to return here if he is not getting better in that time, sooner for any new symptoms return for the worse.    FINAL DIAGNOSIS  1. Pain of left lower extremity    2.  Suspect hematoma.  3.  Early abscess however is not excluded.         Electronically signed by: Dante Hurtado M.D., 2/4/2023 1:06 PM

## 2023-02-04 NOTE — ED NOTES
Med rec updated and complete, per pt   Allergies reviewed, per pt  Pt reports that he stopped taking his METFORMIN 1000MG, LISINOPRIL 5MG, and JANUVIA 100MG in the last 3 months.

## 2023-02-05 NOTE — DISCHARGE INSTRUCTIONS
Like we talked about, this looks like a bruise although early infection is not impossible.  I recommended rest, no work for the next 24 to 36 hours.  Take it easy, keep the leg elevated while at rest.  Ibuprofen will help with pain and swelling.     If you are not improving in 24-36 hours--or for any turn for the worse--return here for recheck.

## 2023-02-05 NOTE — ED NOTES
Discharge instructions given and discussed. RX sent to preferred pharmacy, and pt educated. Pt educated to come back to ER for new or worsening symptoms and follow up with PCP as instructed. Pt verbalized understanding. VSS. Pt  Discharged in stable condition.

## 2023-02-23 NOTE — HOSPITAL COURSE
"Per notes, \"37 y.o. male who presented 10/14/2022 with back pain, fever and chills.  Patient states his back pain was centrally located with radiation out to the bilateral flanks.  He describes it as dull with occasional episodes of sharp pain, moderate in severity.  He states his pain actually started last night before going to bed but it was mild, it was more severe whenever he woke up however he went about his day.  Patient does have chronic back pain and thought maybe this was something similar.  He states later in the day began having fever and chills and then had multiple episodes of nausea and vomiting.  He states it was bilious in nature with no blood.  Patient also began having multiple episodes of loose stool.  Because of this, patient presented to the emergency department.  He denies any sick contacts.  I did discuss the case including labs and imaging with the ER physician.\"      " How Severe Is Your Skin Lesion?: moderate Have Your Skin Lesions Been Treated?: not been treated Is This A New Presentation, Or A Follow-Up?: Skin Lesions

## 2023-07-05 ENCOUNTER — APPOINTMENT (OUTPATIENT)
Dept: URGENT CARE | Facility: CLINIC | Age: 38
End: 2023-07-05
Payer: COMMERCIAL

## 2024-01-03 ENCOUNTER — APPOINTMENT (OUTPATIENT)
Dept: RADIOLOGY | Facility: IMAGING CENTER | Age: 39
End: 2024-01-03
Attending: NURSE PRACTITIONER
Payer: COMMERCIAL

## 2024-01-03 ENCOUNTER — OCCUPATIONAL MEDICINE (OUTPATIENT)
Dept: URGENT CARE | Facility: CLINIC | Age: 39
End: 2024-01-03
Payer: COMMERCIAL

## 2024-01-03 VITALS
HEART RATE: 92 BPM | OXYGEN SATURATION: 95 % | RESPIRATION RATE: 16 BRPM | HEIGHT: 69 IN | DIASTOLIC BLOOD PRESSURE: 90 MMHG | BODY MASS INDEX: 46.65 KG/M2 | TEMPERATURE: 97.9 F | WEIGHT: 315 LBS | SYSTOLIC BLOOD PRESSURE: 144 MMHG

## 2024-01-03 DIAGNOSIS — M79.671 ACUTE FOOT PAIN, RIGHT: ICD-10-CM

## 2024-01-03 DIAGNOSIS — S96.911A STRAIN OF RIGHT FOOT, INITIAL ENCOUNTER: Primary | ICD-10-CM

## 2024-01-03 PROCEDURE — 3077F SYST BP >= 140 MM HG: CPT | Performed by: NURSE PRACTITIONER

## 2024-01-03 PROCEDURE — 99213 OFFICE O/P EST LOW 20 MIN: CPT | Performed by: NURSE PRACTITIONER

## 2024-01-03 PROCEDURE — 3080F DIAST BP >= 90 MM HG: CPT | Performed by: NURSE PRACTITIONER

## 2024-01-03 PROCEDURE — 73630 X-RAY EXAM OF FOOT: CPT | Mod: TC,RT | Performed by: RADIOLOGY

## 2024-01-03 RX ORDER — PREDNISONE 20 MG/1
20 TABLET ORAL DAILY
Qty: 7 TABLET | Refills: 0 | Status: SHIPPED | OUTPATIENT
Start: 2024-01-03 | End: 2024-01-10

## 2024-01-03 RX ORDER — MELOXICAM 7.5 MG/1
1 TABLET ORAL
COMMUNITY

## 2024-01-03 ASSESSMENT — FIBROSIS 4 INDEX: FIB4 SCORE: 0.45

## 2024-01-03 NOTE — LETTER
[facility] Urgent [address],   [address] [place] [address]  Phone:  [phone] - Fax:  [phone]   [facility] Progress Report and Disability Certification  Date of Service: 1/3/2024   No Show:  No  Date / Time of Next Visit: 1/10/2024   Claim Information   Patient Name: Connor Jacinto Jr.  Claim Number:     Employer: [employer]  Date of Injury: 12/20/2023     Insurer / TPA: [employer]  ID / SSN:     Occupation: Maintenance Tech  Diagnosis: The encounter diagnosis was Acute foot pain, right.    Medical Information   Related to Industrial Injury? Yes  Comments:unknown    Subjective Complaints:  DOI 12/20/2023.  Pt presents for evaluation of a new work comp injury.  Magdi is a very pleasant 38-year-old male presents to urgent care today with complaints of right-sided foot pain that has been ongoing since 12/20/2023.  He does work at [employer] and notes that he walks approximately 10 to 20 miles per day.  His pain waxes and wanes throughout the day.  He notes at times he tries to apply weight and is unable to do so due to his severe discomfort.  He has been using heat and rest which help alleviate his discomfort.  No use of over-the-counter pain relievers.  He denies any previous injury to this right foot.  No other employers at this time.   Objective Findings:  Right foot: Normal range of motion. Swelling, tenderness and bony tenderness present.      Comments: Bony tenderness present to base of right fifth metatarsal.      Pre-Existing Condition(s):     Assessment:   Initial Visit    Status: Additional Care Required  Permanent Disability:No    Plan: MedicationDiagnostics    Diagnostics: X-ray    Comments:  1.  No radiographic evidence of acute injury.     Disability Information   Status: Released to Restricted Duty    From:  1/3/2024  Through: 1/10/2024 Restrictions are:     Physical Restrictions   Sitting:    Standing:    Stooping:     Bending:      Squatting:    Walking:    Climbin hrs/day Pushing:      Pulling:    Other:    Reaching Above Shoulder (L):   Reaching Above Shoulder (R):       Reaching Below Shoulder (L):    Reaching Below Shoulder (R):      Not to exceed Weight Limits   Carrying(hrs):   Weight Limit(lb):   Lifting(hrs):   Weight  Limit(lb):     Comments: No walking greater than 6 hours/day    Repetitive Actions   Hands: i.e. Fine Manipulations from Grasping:     Feet: i.e. Operating Foot Controls: < or = to 6 hrs/day   Driving / Operate Machinery: < or = to 6 hrs/day   Health Care Provider’s Original or Electronic Signature  JULIAN LacyRSybilN. Health Care Provider’s Original or Electronic Signature    Magdi Nicholas DO MPH     Clinic Name / Location: [address],   [address] [address] Clinic Phone Number: Dept: [phone]   Appointment Time: 11:35 Am Visit Start Time: 12:30 PM   Check-In Time:  12:15 Pm Visit Discharge Time:  1:59 PM   Original-Treating Physician or Chiropractor    Page 2-Insurer/TPA    Page 3-Employer    Page 4-Employee

## 2024-01-03 NOTE — PROGRESS NOTES
Subjective:     Connor Jacinto Jr. is a 38 y.o. male who presents for Foot Injury (WC NEW DOI 12/20/23, R foot, pt states was working inside of an equipment, might of rolled on ankle )      HPI  DOI 12/20/2023.  Pt presents for evaluation of a new work comp injury.  Magdi is a very pleasant 38-year-old male presents to urgent care today with complaints of right-sided foot pain that has been ongoing since 12/20/2023.  He does work at [employer] and notes that he walks approximately 10 to 20 miles per day.  His pain waxes and wanes throughout the day.  He notes at times he tries to apply weight and is unable to do so due to his severe discomfort.  He has been using heat and rest which help alleviate his discomfort.  No use of over-the-counter pain relievers.  He denies any previous injury to this right foot.  No other employers at this time.    Review of Systems   Musculoskeletal:  Positive for joint pain.       PMH:   Past Medical History:   Diagnosis Date   • Chickenpox    • Diabetes (HCC)    • Fatty liver    • Obesity    • Sleep apnea      ALLERGIES:   Allergies   Allergen Reactions   • Morphine      Pt reports that its makes his body feel on fire      SURGHX:   Past Surgical History:   Procedure Laterality Date   • IN INCIS/DRAIN SCROTUM/TESTIS,EPIDIDYM  3/17/2022    Procedure: INCISION AND DRAINAGE, SCROTUM;  Surgeon: James Adams M.D.;  Location: SURGERY [facility];  Service: Urology   • HERNIA REPAIR     • KNEE RECONSTRUCTION       SOCHX:   Social History     Socioeconomic History   • Marital status:    Tobacco Use   • Smoking status: Former   • Smokeless tobacco: Current     Types: Chew   • Tobacco comments:     vape   Vaping Use   • Vaping Use: Former   • Substances: Nicotine, THC, CBD   Substance and Sexual Activity   • Alcohol use: Not Currently     Comment: 3-4 times amonth   • Drug use: Not Currently     Types: Inhaled     Comment: THC   • Sexual activity: Yes     Partners: Female     Comment:  ", works as   at [employer]     FH:   Family History   Problem Relation Age of Onset   • Diabetes Mother    • Heart Disease Father         55   • Hypertension Father    • Diabetes Father    • Lung Disease Father         mesothelioma   • Diabetes Maternal Grandmother    • Sleep Apnea Neg Hx          Objective:   BP (!) 144/90 (BP Location: Right arm, Patient Position: Sitting, BP Cuff Size: Large adult)   Pulse 92   Temp 36.6 °C (97.9 °F) (Temporal)   Resp 16   Ht 1.753 m (5' 9\")   Wt (!) 156 kg (343 lb 9.6 oz)   SpO2 95%   BMI 50.74 kg/m²     Physical Exam  Vitals and nursing note reviewed.   Constitutional:       General: He is not in acute distress.     Appearance: Normal appearance. He is normal weight. He is not ill-appearing or toxic-appearing.   HENT:      Head: Normocephalic.      Right Ear: External ear normal.      Left Ear: External ear normal.      Nose: Nose normal.      Mouth/Throat:      Mouth: Mucous membranes are moist.   Eyes:      General:         Right eye: No discharge.         Left eye: No discharge.      Extraocular Movements: Extraocular movements intact.      Conjunctiva/sclera: Conjunctivae normal.      Pupils: Pupils are equal, round, and reactive to light.   Pulmonary:      Effort: Pulmonary effort is normal.      Breath sounds: Normal breath sounds.   Abdominal:      General: Abdomen is flat.   Musculoskeletal:         General: Normal range of motion.      Cervical back: Normal range of motion and neck supple. No rigidity.      Right foot: Normal range of motion. Swelling, tenderness and bony tenderness present.      Comments: Bony tenderness present to base of right fifth metatarsal.   Lymphadenopathy:      Cervical: No cervical adenopathy.   Skin:     General: Skin is warm and dry.   Neurological:      General: No focal deficit present.      Mental Status: He is alert and oriented to person, place, and time. Mental status is at baseline.   Psychiatric:         Mood and " Affect: Mood normal.         Behavior: Behavior normal.         Judgment: Judgment normal.     DX-FOOT-COMPLETE 3+ RIGHT    Result Date: 1/3/2024  1/3/2024 1:05 PM HISTORY/REASON FOR EXAM:  Repetitive strain/pain. TECHNIQUE/EXAM DESCRIPTION AND NUMBER OF VIEWS: 3 of the right foot COMPARISON: None. FINDINGS: There is normal bony mineralization.  There is no evidence of fracture, dislocation, or osseous lesion.  There is no evidence of soft tissue injury.     1.  No radiographic evidence of acute injury.     Assessment/Plan:   Assessment    1. Acute foot pain, right  DX-FOOT-COMPLETE 3+ RIGHT    predniSONE (DELTASONE) 20 MG Tab        X-ray results discussed with patient.  Due to swelling and continued foot pain he was prescribed 1 week course of prednisone for relief of inflammation and pain. Tylenol/Ibuprofen as needed for discomfort.  Pt was encouraged to rest, ice 20 minutes 3 times daily for 3 days, elevate at heart level and compress with Ace wrap. Ace wrap applied in clinic by MA. Gentle ROM exercises encouraged.  Questions/concerns addressed.  Work restrictions provided today.  Patient to return in 7 days or sooner for worsening symptoms.

## 2024-01-03 NOTE — LETTER
[facility] Urgent [address],   [address] [place] [address]  Phone:  [phone] - Fax:  [phone]   [facility] Progress Report and Disability Certification  Date of Service: 1/3/2024   No Show:  No  Date / Time of Next Visit: 1/10/2024   Claim Information   Patient Name: Connor Jacinto Jr.  Claim Number:     Employer: [employer] *** Date of Injury: 12/20/2023     Insurer / TPA: [employer] *** ID / SSN:     Occupation:  *** Diagnosis: The primary encounter diagnosis was Strain of right foot, initial encounter. A diagnosis of Acute foot pain, right was also pertinent to this visit.    Medical Information   Related to Industrial Injury? Yes  Comments:unknown ***   Subjective Complaints:  DOI 12/20/2023.  Pt presents for evaluation of a new work comp injury.  Magdi is a very pleasant 38-year-old male presents to urgent care today with complaints of right-sided foot pain that has been ongoing since 12/20/2023.  He does work at [employer] and notes that he walks approximately 10 to 20 miles per day.  His pain waxes and wanes throughout the day.  He notes at times he tries to apply weight and is unable to do so due to his severe discomfort.  He has been using heat and rest which help alleviate his discomfort.  No use of over-the-counter pain relievers.  He denies any previous injury to this right foot.  No other employers at this time.   Objective Findings:  Right foot: Normal range of motion. Swelling, tenderness and bony tenderness present.      Comments: Bony tenderness present to base of right fifth metatarsal.      Pre-Existing Condition(s):     Assessment:   Initial Visit    Status: Additional Care Required  Permanent Disability:No    Plan: MedicationDiagnostics    Diagnostics: X-ray    Comments:  1.  No radiographic evidence of acute injury.     Disability Information   Status: Released to Restricted Duty    From:   1/3/2024  Through: 1/10/2024 Restrictions are:     Physical Restrictions   Sitting:    Standing:    Stooping:    Bending:      Squatting:    Walking:    Climbin hrs/day Pushing:      Pulling:    Other:    Reaching Above Shoulder (L):   Reaching Above Shoulder (R):       Reaching Below Shoulder (L):    Reaching Below Shoulder (R):      Not to exceed Weight Limits   Carrying(hrs):   Weight Limit(lb):   Lifting(hrs):   Weight  Limit(lb):     Comments: No walking greater than 6 hours/day    Repetitive Actions   Hands: i.e. Fine Manipulations from Grasping:     Feet: i.e. Operating Foot Controls: < or = to 6 hrs/day   Driving / Operate Machinery: < or = to 6 hrs/day   Health Care Provider’s Original or Electronic Signature  ENID Lacy Health Care Provider’s Original or Electronic Signature    Magdi Nicholas DO MPH     Clinic Name / Location: [address],   [address] [address] Clinic Phone Number: Dept: [phone]   Appointment Time: 11:35 Am Visit Start Time: 12:30 PM   Check-In Time:  12:15 Pm Visit Discharge Time: 6:42 Pm ***   Original-Treating Physician or Chiropractor    Page 2-Insurer/TPA    Page 3-Employer    Page 4-Employee

## 2024-01-03 NOTE — LETTER
"    EMPLOYEE’S CLAIM FOR COMPENSATION/ REPORT OF INITIAL TREATMENT  FORM C-4  PLEASE TYPE OR PRINT    EMPLOYEE’S CLAIM - PROVIDE ALL INFORMATION REQUESTED   First Name                    RADHA Ryan Last Name  Orville Birthdate                    1985                Sex  []M  []F Claim Number (Insurer’s Use Only)     Home Address  [address] Age  38 y.o. 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 (if applicable)  [employer]    How did this injury or occupational disease occur? (Be specific and answer in detail. Use additional sheet if necessary)  Walking   If you believe that you have an occupational disease, when did you first have knowledge of the disability and its relationship to your employment?  N/A Witnesses to the Accident (if applicable)  N/A      Nature of Injury or Occupational Disease  Defer  Part(s) of Body Injured or Affected  Foot (R) N/A N/A    I CERTIFY THAT THE ABOVE IS TRUE AND CORRECT TO T HE BEST OF MY KNOWLEDGE AND THAT I HAVE PROVIDED THIS INFORMATION " IN ORDER TO OBTAIN THE BENEFITS OF [place], OR CHAPTER 617 [place]).  I HEREBY AUTHORIZE ANY PHYSICIAN, CHIROPRACTOR, SURGEON, PRACTITIONER OR ANY OTHER PERSON, ANY HOSPITAL, INCLUDING [facility], ANY  MEDICAL SERVICE ORGANIZATION, ANY INSURANCE COMPANY, OR OTHER INSTITUTION OR ORGANIZATION TO RELEASE TO EACH OTHER, ANY MEDICAL OR OTHER INFORMATION, INCLUDING BENEFITS PAID OR PAYABLE, PERTINENT TO THIS INJURY OR DISEASE, EXCEPT INFORMATION RELATIVE TO DIAGNOSIS, TREATMENT AND/OR COUNSELING FOR AIDS, PSYCHOLOGICAL CONDITIONS, ALCOHOL OR CONTROLLED SUBSTANCES, FOR WHICH I MUST GIVE SPECIFIC AUTHORIZATION.  A PHOTOSTAT OF THIS AUTHORIZATION SHALL BE VALID AS THE ORIGINAL.     Date   Place Employee’s Original or  *Electronic Signature   THIS REPORT MUST BE COMPLETED AND MAILED WITHIN 3 WORKING DAYS OF TREATMENT   Place  [facility]    Name of Facility  Amandeep Zhao   Date 1/3/2024 Diagnosis and Description of Injury or Occupational Disease  (S96.911A) Strain of right foot, initial encounter  (primary encounter diagnosis)  (M79.671) Acute foot pain, right  The primary encounter diagnosis was Strain of right foot, initial encounter. A diagnosis of Acute foot pain, right was also pertinent to this visit. Is there evidence that the injured employee was under the influence of alcohol and/or another controlled substance at the time of accident?  []No  [] Yes (if yes, please explain)   Hour 12:30 PM  No   Treatment: Work restrictions, medication    Have you advised the patient to remain off work five days or more?   [] Yes Indicate dates: From   To    []No If no, is the injured employee capable of: [] full duty [] modified duty                                                             No  Yes  If modified duty, specify any limitations / restrictions:  No going up and down stairs, no  climbing no walking greater than 6 hours per shift                                                                                                                                                                                                                                                                                                                                                                                                               X-Ray Findings: Negative    From information given by the employee, together with medical evidence, can you directly connect this injury or occupational disease as job incurred?  []Yes   [] No Yes    Is additional medical care by a physician indicated? []Yes [] No  Yes    Do you know of any previous injury or disease contributing to this condition or occupational disease? []Yes [] No (Explain if yes)                          No   Date  1/31/2024 Print Health Care Provider’s Name  ENID Lacy I certify that the employer’s copy of  this form was delivered to the employer on:   Address  [address],   [address] INSURER'S USE ONLY                       [place]  [address] Provider’s Tax ID Number  [id number]   Telephone  Dept: [phone]    Health Care Provider’s Original or Electronic Signature  e-JULIO Parker.R.NSybil Degree (MD,DO, DC,PA-C,APRN)  {Provider Degrees:[id number]}  Choose (if applicable)      ORIGINAL - TREATING HEALTHCARE PROVIDER PAGE 2 - INSURER/TPA PAGE 3 - EMPLOYER PAGE 4 - EMPLOYEE             Form C-4 (rev.08/23)        BRIEF DESCRIPTION OF RIGHTS AND BENEFITS  (Pursuant to [place] 616C.050)    Notice of Injury or Occupational Disease (Incident Report Form C-1): If an injury or occupational disease (OD) arises out of and in the course of employment, you must provide written notice to your employer as soon as practicable, but no later than 7 days after the accident or OD. Your employer shall maintain a  "sufficient supply of the required forms.    Claim for Compensation (Form C-4): If medical treatment is sought, the form C-4 is available at the place of initial treatment. A completed \"Claim for Compensation\" (Form C-4) must be filed within 90 days after an accident or OD. The treating physician or chiropractor must, within 3 working days after treatment, complete and mail to the employer, the employer's insurer and third-party , the Claim for Compensation.    Medical Treatment: If you require medical treatment for your on-the-job injury or OD, you may be required to select a physician or chiropractor from a list provided by your workers’ compensation insurer, if it has contracted with an Organization for Managed Care (MCO) or Preferred Provider Organization (PPO) or providers of health care. If your employer has not entered into a contract with an MCO or PPO, you may select a physician or chiropractor from the Panel of Physicians and Chiropractors. Any medical costs related to your industrial injury or OD will be paid by your insurer.    Temporary Total Disability (TTD): If your doctor has certified that you are unable to work for a period of at least 5 consecutive days, or 5 cumulative days in a 20-day period, or places restrictions on you that your employer does not accommodate, you may be entitled to TTD compensation.    Temporary Partial Disability (TPD): If the wage you receive upon reemployment is less than the compensation for TTD to which you are entitled, the insurer may be required to pay you TPD compensation to make up the difference. TPD can only be paid for a maximum of 24 months.    Permanent Partial Disability (PPD): When your medical condition is stable and there is an indication of a PPD as a result of your injury or OD, within 30 days, your insurer must arrange for an evaluation by a rating physician or chiropractor to determine the degree of your PPD. The amount of your PPD award " depends on the date of injury, the results of the PPD evaluation, your age and wage.    Permanent Total Disability (PTD): If you are medically certified by a treating physician or chiropractor as permanently and totally disabled and have been granted a PTD status by your insurer, you are entitled to receive monthly benefits not to exceed 66 2/3% of your average monthly wage. The amount of your PTD payments is subject to reduction if you previously received a lump-sum PPD award.    Vocational Rehabilitation Services: You may be eligible for vocational rehabilitation services if you are unable to return to the job due to a permanent physical impairment or permanent restrictions as a result of your injury or occupational disease.    Transportation and Per Cindy Reimbursement: You may be eligible for travel expenses and per cindy associated with medical treatment.    Reopening: You may be able to reopen your claim if your condition worsens after claim closure.     Appeal Process: If you disagree with a written determination issued by the insurer or the insurer does not respond to your request, you may appeal to the Department of Administration, , by following the instructions contained in your determination letter. You must appeal the determination within 70 days from the date of the determination letter at [address], [address], [place], [place] [address], or [address], [place] [address]. If you disagree with the  decision, you may appeal to the Department of Administration, . You must file your appeal within 30 days from the date of the  decision letter at [address], [address], [place], [place] [address], or [address] [facility] [address], [place] [address]. If you disagree with a decision of an , you may file a petition for judicial review with the District Court. You must do so within 30  days of the Appeal Officer’s decision. You may be represented by an  at your own expense or you may contact the [facility] for possible representation.    [place]  for Injured Workers (NAIW): If you disagree with a  decision, you may request that NAIW represent you without charge at an  Hearing. For information regarding denial of benefits, you may contact the [facility] at: [address], [address], [place] [address], [phone]) [phone], or [address], [place] [address], [phone]    To File a Complaint with the Division: If you wish to file a complaint with the  of the Division of Industrial Relations (DIR),  please contact the Workers’ Compensation Section, [address], [address], [place], [place] [address], telephone ([phone]) [phone], or [address], [place], [place] [address], telephone ([phone]) [phone].    For assistance with Workers’ Compensation Issues: You may contact the [facility] Office for Consumer Health Assistance, [address], [place], [place] [address], Toll Free 1-723.524.2625, Web site: http://Replaced by [facility].[place].gov/Programs/MAURICE E-mail: [email or web address]              __________________________________________________________________                                    _________________            Employee Name / Signature                                                                                                                            Date                                                                                                                                                                                                                              D-2 (rev. 10/20)

## 2024-01-03 NOTE — LETTER
"    EMPLOYEE’S CLAIM FOR COMPENSATION/ REPORT OF INITIAL TREATMENT  FORM C-4  PLEASE TYPE OR PRINT    EMPLOYEE’S CLAIM - PROVIDE ALL INFORMATION REQUESTED   First Name                    RADHA Ryan Last Name  Orville Birthdate                    1985                Sex  []M  []F Claim Number (Insurer’s Use Only)     Home Address  [address] Age  38 y.o. 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 (if applicable)  [employer]    How did this injury or occupational disease occur? (Be specific and answer in detail. Use additional sheet if necessary)  Walking   If you believe that you have an occupational disease, when did you first have knowledge of the disability and its relationship to your employment?  N/A Witnesses to the Accident (if applicable)  N/A      Nature of Injury or Occupational Disease  Defer  Part(s) of Body Injured or Affected  Foot (R) N/A N/A    I CERTIFY THAT THE ABOVE IS TRUE AND CORRECT TO T HE BEST OF MY KNOWLEDGE AND THAT I HAVE PROVIDED THIS " INFORMATION IN ORDER TO OBTAIN THE BENEFITS OF [place], OR [place]).  I HEREBY AUTHORIZE ANY PHYSICIAN, CHIROPRACTOR, SURGEON, PRACTITIONER OR ANY OTHER PERSON, ANY HOSPITAL, INCLUDING [facility] OR [facility], ANY  MEDICAL SERVICE ORGANIZATION, ANY INSURANCE COMPANY, OR OTHER INSTITUTION OR ORGANIZATION TO RELEASE TO EACH OTHER, ANY MEDICAL OR OTHER INFORMATION, INCLUDING BENEFITS PAID OR PAYABLE, PERTINENT TO THIS INJURY OR DISEASE, EXCEPT INFORMATION RELATIVE TO DIAGNOSIS, TREATMENT AND/OR COUNSELING FOR AIDS, PSYCHOLOGICAL CONDITIONS, ALCOHOL OR CONTROLLED SUBSTANCES, FOR WHICH I MUST GIVE SPECIFIC AUTHORIZATION.  A PHOTOSTAT OF THIS AUTHORIZATION SHALL BE VALID AS THE ORIGINAL.     Date   Place Employee’s Original or  *Electronic Signature   THIS REPORT MUST BE COMPLETED AND MAILED WITHIN 3 WORKING DAYS OF TREATMENT   Place  [facility]    Name of Facility  Amandeep Zhao   Date 1/3/2024 Diagnosis and Description of Injury or Occupational Disease  (M79.671) Acute foot pain, right  The encounter diagnosis was Acute foot pain, right. Is there evidence that the injured employee was under the influence of alcohol and/or another controlled substance at the time of accident?  []No  [] Yes (if yes, please explain)   Hour 12:30 PM  No   Treatment: Work restrictions, medication    Have you advised the patient to remain off work five days or more?   [] Yes Indicate dates: From   To    []No If no, is the injured employee capable of: [] full duty [] modified duty                                                             No  Yes  If modified duty, specify any limitations / restrictions:  No going up and down stairs, no climbing no walking greater than 6 hours per shift                                                                                                                            "                                                                                                                                                                                                                                                                                    X-Ray Findings: Negative    From information given by the employee, together with medical evidence, can you directly connect this injury or occupational disease as job incurred?  []Yes   [] No Yes    Is additional medical care by a physician indicated? []Yes [] No  Yes    Do you know of any previous injury or disease contributing to this condition or occupational disease? []Yes [] No (Explain if yes)                          No   Date  1/3/2024 Print Health Care Provider’s Name  ENID Lacy I certify that the employer’s copy of  this form was delivered to the employer on:   Address  [address],   [address] INSURER'S USE ONLY                       [place]  [address] Provider’s Tax ID Number  [id number]   Telephone  Dept: [phone]    Health Care Provider’s Original or Electronic Signature  e-JULIO Parker Degree (MD,DO, DC,PA-C,APRN)  APRN  Choose (if applicable)      ORIGINAL - TREATING HEALTHCARE PROVIDER PAGE 2 - INSURER/TPA PAGE 3 - EMPLOYER PAGE 4 - EMPLOYEE             Form C-4 (rev.08/23)        BRIEF DESCRIPTION OF RIGHTS AND BENEFITS  (Pursuant to [place] 616[place])    Notice of Injury or Occupational Disease (Incident Report Form C-1): If an injury or occupational disease (OD) arises out of and in the course of employment, you must provide written notice to your employer as soon as practicable, but no later than 7 days after the accident or OD. Your employer shall maintain a sufficient supply of the required forms.    Claim for Compensation (Form C-4): If medical treatment is sought, the form C-4 is available at the place of initial treatment. A completed \"Claim for " "Compensation\" (Form C-4) must be filed within 90 days after an accident or OD. The treating physician or chiropractor must, within 3 working days after treatment, complete and mail to the employer, the employer's insurer and third-party , the Claim for Compensation.    Medical Treatment: If you require medical treatment for your on-the-job injury or OD, you may be required to select a physician or chiropractor from a list provided by your workers’ compensation insurer, if it has contracted with an Organization for Managed Care (MCO) or Preferred Provider Organization (PPO) or providers of health care. If your employer has not entered into a contract with an MCO or PPO, you may select a physician or chiropractor from the Panel of Physicians and Chiropractors. Any medical costs related to your industrial injury or OD will be paid by your insurer.    Temporary Total Disability (TTD): If your doctor has certified that you are unable to work for a period of at least 5 consecutive days, or 5 cumulative days in a 20-day period, or places restrictions on you that your employer does not accommodate, you may be entitled to TTD compensation.    Temporary Partial Disability (TPD): If the wage you receive upon reemployment is less than the compensation for TTD to which you are entitled, the insurer may be required to pay you TPD compensation to make up the difference. TPD can only be paid for a maximum of 24 months.    Permanent Partial Disability (PPD): When your medical condition is stable and there is an indication of a PPD as a result of your injury or OD, within 30 days, your insurer must arrange for an evaluation by a rating physician or chiropractor to determine the degree of your PPD. The amount of your PPD award depends on the date of injury, the results of the PPD evaluation, your age and wage.    Permanent Total Disability (PTD): If you are medically certified by a treating physician or chiropractor as " permanently and totally disabled and have been granted a PTD status by your insurer, you are entitled to receive monthly benefits not to exceed 66 2/3% of your average monthly wage. The amount of your PTD payments is subject to reduction if you previously received a lump-sum PPD award.    Vocational Rehabilitation Services: You may be eligible for vocational rehabilitation services if you are unable to return to the job due to a permanent physical impairment or permanent restrictions as a result of your injury or occupational disease.    Transportation and Per Cindy Reimbursement: You may be eligible for travel expenses and per cindy associated with medical treatment.    Reopening: You may be able to reopen your claim if your condition worsens after claim closure.     Appeal Process: If you disagree with a written determination issued by the insurer or the insurer does not respond to your request, you may appeal to the Department of Administration, , by following the instructions contained in your determination letter. You must appeal the determination within 70 days from the date of the determination letter at [address], [address], [place] [address], or [address], [place] [address]. If you disagree with the  decision, you may appeal to the Department of Administration, . You must file your appeal within 30 days from the date of the  decision letter at [address], [address], [place], [place] [address], or [address] [facility] [address], [place] [address]. If you disagree with a decision of an , you may file a petition for judicial review with the District Court. You must do so within 30 days of the Appeal Officer’s decision. You may be represented by an  at your own expense or you may contact the [facility] for possible representation.    [place]  for Injured Workers  (NAIW): If you disagree with a  decision, you may request that NAIW represent you without charge at an  Hearing. For information regarding denial of benefits, you may contact the [facility] at: [address], [address], [place] [address], [phone], or [address], [place] [address], [phone]    To File a Complaint with the Division: If you wish to file a complaint with the  of the Division of Industrial Relations (DIR),  please contact the Workers’ Compensation Section, [address], [address], [place], [place] [address], telephone [phone]) [phone], or [address], [address], [place], [place] [address], telephone [phone].    For assistance with Workers’ Compensation Issues: You may contact the [facility] Office for Consumer Health Assistance, [address], [address], [place], [place] [address], Toll Free 1-155.899.7512, Web site: http://[facility].[place].gov/Programs/MAURICE E-mail: [email or web address].[facility]              __________________________________________________________________                                    _________________            Employee Name / Signature                                                                                                                            Date                                                                                                                                                                                                                              D-2 (rev. 10/20)

## 2024-01-14 ENCOUNTER — OCCUPATIONAL MEDICINE (OUTPATIENT)
Dept: URGENT CARE | Facility: CLINIC | Age: 39
End: 2024-01-14
Payer: COMMERCIAL

## 2024-01-14 VITALS
BODY MASS INDEX: 46.65 KG/M2 | RESPIRATION RATE: 18 BRPM | TEMPERATURE: 98 F | HEIGHT: 69 IN | OXYGEN SATURATION: 96 % | SYSTOLIC BLOOD PRESSURE: 144 MMHG | HEART RATE: 116 BPM | WEIGHT: 315 LBS | DIASTOLIC BLOOD PRESSURE: 86 MMHG

## 2024-01-14 DIAGNOSIS — S93.601D SPRAIN OF RIGHT FOOT, SUBSEQUENT ENCOUNTER: ICD-10-CM

## 2024-01-14 PROCEDURE — 3077F SYST BP >= 140 MM HG: CPT | Performed by: NURSE PRACTITIONER

## 2024-01-14 PROCEDURE — 99213 OFFICE O/P EST LOW 20 MIN: CPT | Performed by: NURSE PRACTITIONER

## 2024-01-14 PROCEDURE — 3079F DIAST BP 80-89 MM HG: CPT | Performed by: NURSE PRACTITIONER

## 2024-01-14 ASSESSMENT — FIBROSIS 4 INDEX: FIB4 SCORE: 0.45

## 2024-01-14 NOTE — LETTER
[facility] [address],   [address] [place] [address]  Phone:  [phone] - Fax:  [phone]   [facility] Progress Report and Disability Certification  Date of Service: 1/14/2024   No Show:  No  Date / Time of Next Visit: 1/19/2024   Claim Information   Patient Name: Connor Jacinto Jr.  Claim Number:     Employer: [employer]  Date of Injury: 12/20/2023     Insurer / TPA: [employer]  ID / SSN:     Occupation: Maintenance Tech  Diagnosis: The encounter diagnosis was Sprain of right foot, subsequent encounter.    Medical Information   Related to Industrial Injury? Yes    Subjective Complaints:  DOI 12/20/2023.  RUSSELL: Overuse with excessive walking up to 20 miles per day while at work.      Magdi is a very pleasant 38-year-old male presenting to clinic for his work men's comp follow-up visit.  He states that he has had no improvement in his pain or symptoms.  He did take the course of prednisone which she states did not improve his pain.  He has also been using an Ace wrap.  He has been following his work restrictions of not walking or standing for more than 6 hours a day.  The pain is worse after work.  He states he has been using heat which does improve the pain for short while although the pain does quickly return.  He states that the pain is worse when he has been sitting for 30 minutes or more or in the morning.  He denies any previous injury or second job.     Objective Findings: Right foot: Mild decrease in range of motion due to pain.  Patient has focal tenderness starting at the Lisfranc joint of the lateral foot extending distal to the lateral malleoli.  There is no swelling ecchymosis or erythema noted.  Distal neurovascular status is grossly intact with cap refill less than 3 seconds.   Pre-Existing Condition(s):     Assessment:   Condition Same    Status: Additional Care Required  Permanent Disability:No    Plan:    Comments:Ice and ibuprofen    Diagnostics: X-ray  Comments:negative    Comments:   Patient will be referred to occupational medicine as he does have concerns for possible soft tissue injury and would like to discuss higher level of imaging.    Disability Information   Status: Released to Restricted Duty    From:  2024  Through: 2024 Restrictions are: Temporary   Physical Restrictions   Sitting:    Standing:  < or = to 4 hrs/day Stooping:    Bending:      Squatting:    Walking:  < or = to 4 hrs/day Climbin hrs/day Pushin hrs/day   Pullin hrs/day Other:    Reaching Above Shoulder (L):   Reaching Above Shoulder (R):       Reaching Below Shoulder (L):    Reaching Below Shoulder (R):      Not to exceed Weight Limits   Carrying(hrs): 0 Weight Limit(lb):   Lifting(hrs): 0 Weight  Limit(lb):     Comments: Patient will be placed in a walking boot decrease work restrictions to no improvement.   Recommend desk or sedentary work.   Rest, Ice, elevation and ibuprofen.   No walking or standing for more than 4 hours a day.   Wear walking boot while at work and walking.     Repetitive Actions   Hands: i.e. Fine Manipulations from Grasping:     Feet: i.e. Operating Foot Controls:     Driving / Operate Machinery:     Health Care Provider’s Original or Electronic Signature  JULIAN SimmonsRSybilN. Health Care Provider’s Original or Electronic Signature    Magdi Nicholas DO MPH     Clinic Name / Location: [address],   [address]  [place] [address] Clinic Phone Number: Dept: [phone]   Appointment Time: 3:40 Pm Visit Start Time: 3:40 PM   Check-In Time:  3:37 Pm Visit Discharge Time:  423pm   Original-Treating Physician or Chiropractor    Page 2-Insurer/TPA    Page 3-Employer    Page 4-Employee

## 2024-01-15 NOTE — PROGRESS NOTES
"Subjective:     Connor Jacinto Jr. is a 38 y.o. male who presents for Other (W/C follow up right foot , both knees)      DOI 12/20/2023.  RUSSELL: Overuse with excessive walking up to 20 miles per day while at work.      Magdi is a very pleasant 38-year-old male presenting to clinic for his work men's comp follow-up visit.  He states that he has had no improvement in his pain or symptoms.  He did take the course of prednisone which she states did not improve his pain.  He has also been using an Ace wrap.  He has been following his work restrictions of not walking or standing for more than 6 hours a day.  The pain is worse after work.  He states he has been using heat which does improve the pain for short while although the pain does quickly return.  He states that the pain is worse when he has been sitting for 30 minutes or more or in the morning.  He denies any previous injury or second job.      PMH:   No pertinent past medical history to this problem  MEDS:  Medications were reviewed in EMR  ALLERGIES:  Allergies were reviewed in EMR  FH:   No pertinent family history to this problem       Objective:     BP (!) 144/86   Pulse (!) 116   Temp 36.7 °C (98 °F) (Temporal)   Resp 18   Ht 1.753 m (5' 9\")   Wt (!) 157 kg (346 lb)   SpO2 96%   BMI 51.10 kg/m²     Right foot: Mild decrease in range of motion due to pain.  Patient has focal tenderness starting at the Lisfranc joint of the lateral foot extending distal to the lateral malleoli.  There is no swelling ecchymosis or erythema noted.  Distal neurovascular status is grossly intact with cap refill less than 3 seconds.    Assessment/Plan:     Pleasant 38-year-old male presents to clinic with continued pain to his right foot second Workmen's Comp. visit.  Patient did request higher level of imaging will refer to occupational medicine for evaluation of CT versus MRI.  Did discuss HPI examining's are consistent with overuse, arthritis versus sprain of the foot.  Did " discuss that with pain improving with ambulation is more consistent with arthritic type pain.  Patient originally declined walking boot although due to no improvement he is willing to trial a walking boot.  Did review previous notes as well as imaging.    1. Sprain of right foot, subsequent encounter  - Referral to Occupational Medicine    Released to Restricted Duty FROM 1/14/2024 TO 1/19/2024  Patient will be placed in a walking boot decrease work restrictions to no improvement.   Recommend desk or sedentary work.   Rest, Ice, elevation and ibuprofen.   No walking or standing for more than 4 hours a day.   Wear walking boot while at work and walking.    Patient will be referred to occupational medicine as he does have concerns for possible soft tissue injury and would like to discuss higher level of imaging.    Differential diagnosis, natural history, supportive care, and indications for immediate follow-up discussed.

## 2024-01-22 ENCOUNTER — OCCUPATIONAL MEDICINE (OUTPATIENT)
Dept: URGENT CARE | Facility: CLINIC | Age: 39
End: 2024-01-22
Payer: COMMERCIAL

## 2024-01-22 VITALS
HEART RATE: 86 BPM | RESPIRATION RATE: 18 BRPM | OXYGEN SATURATION: 96 % | BODY MASS INDEX: 46.65 KG/M2 | HEIGHT: 69 IN | TEMPERATURE: 97.8 F | DIASTOLIC BLOOD PRESSURE: 82 MMHG | SYSTOLIC BLOOD PRESSURE: 132 MMHG | WEIGHT: 315 LBS
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
## 2024-01-22 DIAGNOSIS — M79.671 RIGHT FOOT PAIN: ICD-10-CM

## 2024-01-22 PROCEDURE — 3075F SYST BP GE 130 - 139MM HG: CPT | Performed by: STUDENT IN AN ORGANIZED HEALTH CARE EDUCATION/TRAINING PROGRAM

## 2024-01-22 PROCEDURE — 99214 OFFICE O/P EST MOD 30 MIN: CPT | Performed by: STUDENT IN AN ORGANIZED HEALTH CARE EDUCATION/TRAINING PROGRAM

## 2024-01-22 PROCEDURE — 3079F DIAST BP 80-89 MM HG: CPT | Performed by: STUDENT IN AN ORGANIZED HEALTH CARE EDUCATION/TRAINING PROGRAM

## 2024-01-22 ASSESSMENT — FIBROSIS 4 INDEX: FIB4 SCORE: 0.45

## 2024-01-22 NOTE — LETTER
[facility] [address],   [address] [place] [address]  Phone:  [phone] - Fax:  [phone]   Occ[facility] Progress Report and Disability Certification  Date of Service: 1/22/2024   No Show:  No  Date / Time of Next Visit: 1/29/2024   Claim Information   Patient Name: Connor Jacinto Jr.  Claim Number:     Employer: [employer] *** Date of Injury: 12/20/2023     Insurer / TPA: [employer] *** ID / SSN:     Occupation:  *** Diagnosis: Diagnoses of Right foot pain and Work related injury were pertinent to this visit.    Medical Information   Related to Industrial Injury? Yes ***   Subjective Complaints:  HPI  DOI 12/20/2023.  Pt presents for evaluation of a new work comp injury.  Magdi is a very pleasant 38-year-old male presents to urgent care today with complaints of right-sided foot pain that has been ongoing since 12/20/2023.  He does work at [employer] and notes that he walks approximately 10 to 20 miles per day.  His pain waxes and wanes throughout the day.  He notes at times he tries to apply weight and is unable to do so due to his severe discomfort.  He has been using heat and rest which help alleviate his discomfort.  No use of over-the-counter pain relievers.  He denies any previous injury to this right foot.  No other employers at this time.    Today's date: 1/22/2024.  Patient here for follow-up of his right foot pain.  Reports he is approximately 25% better.  Says the boot helps.  He is still having daily pain which is aggravated with weightbearing.  Also says wearing the boot is creating some low back and hip pain due to the leg length discrepancy.  Upon further questioning patient reports he has a history of recurrent ankle sprains/rolling his ankle.  Says this has happened approximately 8 times in the last month or so.  Does not believe there has been any bruising or swelling.   NSAIDs provide limited relief  of symptoms. Has never had surgery on his ankle.    Objective Findings: Gen: no acute distress, normal voice  Skin: dry, intact, moist mucosal membranes  Head: Atraumatic, normocephalic  Psych: normal affect, normal judgement, alert, awake  Musculoskeletal: Right ankle: No erythema, ecchymosis or edema.  Full range of motion all planes associated with mild discernible discomfort with eversion.  TTP @ the fifth metatarsal/cuboid joint, along the dorsal and plantar aspects, without any step-off or crepitus.  No TTP along the lateral malleolus or ATFL.  No laxity with anterior drawer test.      RADIOLOGY RESULTS  DX-FOOT-COMPLETE 3+ RIGHT    Result Date: 1/3/2024  1/3/2024 1:05 PM HISTORY/REASON FOR EXAM:  Repetitive strain/pain. TECHNIQUE/EXAM DESCRIPTION AND NUMBER OF VIEWS: 3 of the right foot COMPARISON: None. FINDINGS: There is normal bony mineralization.  There is no evidence of fracture, dislocation, or osseous lesion.  There is no evidence of soft tissue injury.     1.  No radiographic evidence of acute injury.       Pre-Existing Condition(s):     Assessment:   Condition Improved    Status: Additional Care Required  Permanent Disability:No    Plan:      Diagnostics:      Comments:       Disability Information   Status: Released to Restricted Duty    From:  1/22/2024  Through: 1/29/2024 Restrictions are: Temporary   Physical Restrictions   Sitting:    Standing:  < or = to 4 hrs/day Stooping:    Bending:      Squatting:    Walking:  < or = to 4 hrs/day Climbing:    Pushing:      Pulling:    Other:    Reaching Above Shoulder (L):   Reaching Above Shoulder (R):       Reaching Below Shoulder (L):    Reaching Below Shoulder (R):      Not to exceed Weight Limits   Carrying(hrs):   Weight Limit(lb):   Lifting(hrs):   Weight  Limit(lb):     Comments: Approximately 25% improvement since last visit but still having daily pain aggravated with weightbearing.  Symptoms are of insidious onset without any direct trauma or  "injury to explain the symptoms.  I reviewed the x-rays which were taken has initial visit which demonstrated possible degenerative changes along the calcaneus/cuboid cuboid/fifth metatarsal joints which could be the source of the symptoms.  There are were acute osseous abnormalities.  Patient also reports recurrent ankle sprains/rolling his ankle but I did not appreciate any significant laxity.  -Restrictions per D39.  Limit walking and standing to 4 hours  -Placed in a tall walking boot for better fit.  Patient must wear boot while at work and at all times with ambulation.  -Recommended purchasing an \"even up\" for his contralateral foot to correct the leg length discrepancy  -Ibuprofen as needed for symptomatic relief  -Follow-up with occupational medicine at [address] [place] [address] within 1 to 2 weeks  -Ordered referral to follow-up with orthopedics, foot and ankle    Repetitive Actions   Hands: i.e. Fine Manipulations from Grasping:     Feet: i.e. Operating Foot Controls:     Driving / Operate Machinery:     Health Care Provider’s Original or Electronic Signature  Petey Kraus D.O. Health Care Provider’s Original or Electronic Signature    Magdi Nicholas DO MPH     Clinic Name / Location: [address],   [address]  [place] [address] Clinic Phone Number: Dept: [phone]   Appointment Time: 1:45 Pm Visit Start Time: 2:23 PM   Check-In Time:  1:34 Pm Visit Discharge Time:  ***   Original-Treating Physician or Chiropractor    Page 2-Insurer/TPA    Page 3-Employer    Page 4-Employee      "

## 2024-01-22 NOTE — PROGRESS NOTES
"Subjective:     Connor Jacinto Jr. is a 38 y.o. male who presents for Other (W/C follow up)      HPI  DOI 12/20/2023.  Pt presents for evaluation of a new work comp injury.  Magdi is a very pleasant 38-year-old male presents to urgent care today with complaints of right-sided foot pain that has been ongoing since 12/20/2023.  He does work at [employer] and notes that he walks approximately 10 to 20 miles per day.  His pain waxes and wanes throughout the day.  He notes at times he tries to apply weight and is unable to do so due to his severe discomfort.  He has been using heat and rest which help alleviate his discomfort.  No use of over-the-counter pain relievers.  He denies any previous injury to this right foot.  No other employers at this time.    Today's date: 1/22/2024.  Patient here for follow-up of his right foot pain.  Reports he is approximately 25% better.  Says the boot helps.  He is still having daily pain which is aggravated with weightbearing.  Also says wearing the boot is creating some low back and hip pain due to the leg length discrepancy.  Upon further questioning patient reports he has a history of recurrent ankle sprains/rolling his ankle.  Says this has happened approximately 8 times in the last month or so.  Does not believe there has been any bruising or swelling.   NSAIDs provide limited relief of symptoms. Has never had surgery on his ankle.     PMH:   No pertinent past medical history to this problem  MEDS:  Medications were reviewed in EMR  ALLERGIES:  Allergies were reviewed in EMR  FH:   No pertinent family history to this problem       Objective:     /82   Pulse 86   Temp 36.6 °C (97.8 °F) (Temporal)   Resp 18   Ht 1.753 m (5' 9\")   Wt (!) 157 kg (346 lb)   SpO2 96%   BMI 51.10 kg/m²     Gen: no acute distress, normal voice  Skin: dry, intact, moist mucosal membranes  Head: Atraumatic, normocephalic  Psych: normal affect, normal judgement, alert, awake  Musculoskeletal: Right " "ankle: No erythema, ecchymosis or edema.  Full range of motion all planes associated with mild discernible discomfort with eversion.  TTP @ the fifth metatarsal/cuboid joint, along the dorsal and plantar aspects, without any step-off or crepitus.  No TTP along the lateral malleolus or ATFL.  No laxity with anterior drawer test.      RADIOLOGY RESULTS  DX-FOOT-COMPLETE 3+ RIGHT    Result Date: 1/3/2024  1/3/2024 1:05 PM HISTORY/REASON FOR EXAM:  Repetitive strain/pain. TECHNIQUE/EXAM DESCRIPTION AND NUMBER OF VIEWS: 3 of the right foot COMPARISON: None. FINDINGS: There is normal bony mineralization.  There is no evidence of fracture, dislocation, or osseous lesion.  There is no evidence of soft tissue injury.     1.  No radiographic evidence of acute injury.        Assessment/Plan:       1. Right foot pain    2. Work related injury    Released to Restricted Duty FROM 1/22/2024 TO 1/29/2024  Approximately 25% improvement since last visit but still having daily pain aggravated with weightbearing.  Symptoms are of insidious onset without any direct trauma or injury to explain the symptoms.  I reviewed the x-rays which were taken has initial visit which demonstrated possible degenerative changes along the calcaneus/cuboid cuboid/fifth metatarsal joints which could be the source of the symptoms.  There are were acute osseous abnormalities.  Patient also reports recurrent ankle sprains/rolling his ankle but I did not appreciate any significant laxity.  -Restrictions per D39.  Limit walking and standing to 4 hours  -Placed in a tall walking boot for better fit.  Patient must wear boot while at work and at all times with ambulation.  -Recommended purchasing an \"even up\" for his contralateral foot to correct the leg length discrepancy  -Ibuprofen as needed for symptomatic relief  -Follow-up with occupational medicine at [address] [place] [address] within 1 to 2 weeks  -Ordered referral to follow-up with orthopedics, foot and ankle   "     Differential diagnosis, natural history, supportive care, and indications for immediate follow-up discussed.    31 minutes was spent caring for this patient on the day of the encounter which included review of previous medical records, face-to-face time, reviewing previous x-rays with the patient, discussing the diagnosis, medical management, follow-up, and completion of the chart. This does not include time spent on separately billable procedures/tests.

## 2024-01-22 NOTE — LETTER
[facility] [address],   [address] [place] [address]  Phone:  [phone] - Fax:  [phone]   [facility] Progress Report and Disability Certification  Date of Service: 1/22/2024   No Show:  No  Date / Time of Next Visit: 1/29/2024 w/ OCC MED   Claim Information   Patient Name: Connor Jacinto Jr.  Claim Number:     Employer: [employer] *** Date of Injury: 12/20/2023     Insurer / TPA: [employer] *** ID / SSN:     Occupation:  *** Diagnosis: There were no encounter diagnoses.    Medical Information   Related to Industrial Injury? Yes ***   Subjective Complaints:  HPI  DOI 12/20/2023.  Pt presents for evaluation of a new work comp injury.  Magdi is a very pleasant 38-year-old male presents to urgent care today with complaints of right-sided foot pain that has been ongoing since 12/20/2023.  He does work at [employer] and notes that he walks approximately 10 to 20 miles per day.  His pain waxes and wanes throughout the day.  He notes at times he tries to apply weight and is unable to do so due to his severe discomfort.  He has been using heat and rest which help alleviate his discomfort.  No use of over-the-counter pain relievers.  He denies any previous injury to this right foot.  No other employers at this time.    Today's date: 1/22/2024.  Patient here for follow-up of his right foot pain.  Reports he is approximately 25% better.  Says the boot helps.  He is still having daily pain which is aggravated with weightbearing.  Also says wearing the boot is creating some low back and hip pain due to the leg length discrepancy.  Upon further questioning patient reports he has a history of recurrent ankle sprains/rolling his ankle.  Says this has happened approximately 8 times in the last month or so.  Does not believe there has been any bruising or swelling.   NSAIDs provide limited relief of symptoms. Has never had surgery  on his ankle.    Objective Findings: Gen: no acute distress, normal voice  Skin: dry, intact, moist mucosal membranes  Head: Atraumatic, normocephalic  Psych: normal affect, normal judgement, alert, awake  Musculoskeletal: Right ankle: No erythema, ecchymosis or edema.  Full range of motion all planes associated with mild discernible discomfort with eversion.  TTP @ the fifth metatarsal/cuboid joint, along the dorsal and plantar aspects, without any step-off or crepitus.  No TTP along the lateral malleolus or ATFL.  No laxity with anterior drawer test.      RADIOLOGY RESULTS  DX-FOOT-COMPLETE 3+ RIGHT    Result Date: 1/3/2024  1/3/2024 1:05 PM HISTORY/REASON FOR EXAM:  Repetitive strain/pain. TECHNIQUE/EXAM DESCRIPTION AND NUMBER OF VIEWS: 3 of the right foot COMPARISON: None. FINDINGS: There is normal bony mineralization.  There is no evidence of fracture, dislocation, or osseous lesion.  There is no evidence of soft tissue injury.     1.  No radiographic evidence of acute injury.       Pre-Existing Condition(s):     Assessment:   Condition Improved    Status: Additional Care Required  Permanent Disability:No    Plan:      Diagnostics:      Comments:       Disability Information   Status: Released to Restricted Duty    From:  1/22/2024  Through: 1/29/2024 Restrictions are: Temporary   Physical Restrictions   Sitting:    Standing:  < or = to 4 hrs/day Stooping:    Bending:      Squatting:    Walking:  < or = to 4 hrs/day Climbing:    Pushing:      Pulling:    Other:    Reaching Above Shoulder (L):   Reaching Above Shoulder (R):       Reaching Below Shoulder (L):    Reaching Below Shoulder (R):      Not to exceed Weight Limits   Carrying(hrs):   Weight Limit(lb):   Lifting(hrs):   Weight  Limit(lb):     Comments: Approximately 25% improvement since last visit but still having daily pain aggravated with weightbearing.  Symptoms are of insidious onset without any direct trauma or injury to explain the symptoms.  I  "reviewed the x-rays which were taken has initial visit which demonstrated possible degenerative changes along the calcaneus/cuboid cuboid/fifth metatarsal joints which could be the source of the symptoms.  There are were acute osseous abnormalities.  Patient also reports recurrent ankle sprains/rolling his ankle but I did not appreciate any significant laxity.  -Restrictions per D39.  Limit walking and standing to 4 hours  -Placed in a tall walking boot for better fit.  Patient must wear boot while at work and at all times with ambulation.  -Recommended purchasing an \"even up\" for his contralateral foot to correct the leg length discrepancy  -Ibuprofen as needed for symptomatic relief  -Follow-up with occupational medicine at [address] [place] [address] within 1 to 2 weeks  -Ordered referral to follow-up with orthopedics, foot and ankle    Repetitive Actions   Hands: i.e. Fine Manipulations from Grasping:     Feet: i.e. Operating Foot Controls:     Driving / Operate Machinery:     Health Care Provider’s Original or Electronic Signature  Petey Kraus D.O. Health Care Provider’s Original or Electronic Signature    Magdi Nicholas DO MPH     Clinic Name / Location: [address],   [address]  [place] [address] Clinic Phone Number: Dept: [phone]   Appointment Time: 1:45 Pm Visit Start Time: 2:23 PM   Check-In Time:  1:34 Pm Visit Discharge Time:  ***   Original-Treating Physician or Chiropractor    Page 2-Insurer/TPA    Page 3-Employer    Page 4-Employee      "

## 2024-01-22 NOTE — LETTER
[facility] [address],   [address] [place] [address]  Phone:  [phone] - Fax:  [phone]   [facility] Progress Report and Disability Certification  Date of Service: 1/22/2024   No Show:  No  Date / Time of Next Visit: 1/29/2024 w/ OCC MED   Claim Information   Patient Name: Connor Jacinto Jr.  Claim Number:     Employer: [employer]  Date of Injury: 12/20/2023     Insurer / TPA: [employer]  ID / SSN:     Occupation: Maintenance Tech  Diagnosis: Diagnoses of Right foot pain and Work related injury were pertinent to this visit.    Medical Information   Related to Industrial Injury? Yes    Subjective Complaints:  HPI  DOI 12/20/2023.  Pt presents for evaluation of a new work comp injury.  Magdi is a very pleasant 38-year-old male presents to urgent care today with complaints of right-sided foot pain that has been ongoing since 12/20/2023.  He does work at [employer] and notes that he walks approximately 10 to 20 miles per day.  His pain waxes and wanes throughout the day.  He notes at times he tries to apply weight and is unable to do so due to his severe discomfort.  He has been using heat and rest which help alleviate his discomfort.  No use of over-the-counter pain relievers.  He denies any previous injury to this right foot.  No other employers at this time.    Today's date: 1/22/2024.  Patient here for follow-up of his right foot pain.  Reports he is approximately 25% better.  Says the boot helps.  He is still having daily pain which is aggravated with weightbearing.  Also says wearing the boot is creating some low back and hip pain due to the leg length discrepancy.  Upon further questioning patient reports he has a history of recurrent ankle sprains/rolling his ankle.  Says this has happened approximately 8 times in the last month or so.  Does not believe there has been any bruising or swelling.   NSAIDs provide limited relief  of symptoms. Has never had surgery on his ankle.    Objective Findings: Gen: no acute distress, normal voice  Skin: dry, intact, moist mucosal membranes  Head: Atraumatic, normocephalic  Psych: normal affect, normal judgement, alert, awake  Musculoskeletal: Right ankle: No erythema, ecchymosis or edema.  Full range of motion all planes associated with mild discernible discomfort with eversion.  TTP @ the fifth metatarsal/cuboid joint, along the dorsal and plantar aspects, without any step-off or crepitus.  No TTP along the lateral malleolus or ATFL.  No laxity with anterior drawer test.      RADIOLOGY RESULTS  DX-FOOT-COMPLETE 3+ RIGHT    Result Date: 1/3/2024  1/3/2024 1:05 PM HISTORY/REASON FOR EXAM:  Repetitive strain/pain. TECHNIQUE/EXAM DESCRIPTION AND NUMBER OF VIEWS: 3 of the right foot COMPARISON: None. FINDINGS: There is normal bony mineralization.  There is no evidence of fracture, dislocation, or osseous lesion.  There is no evidence of soft tissue injury.     1.  No radiographic evidence of acute injury.       Pre-Existing Condition(s):     Assessment:   Condition Improved    Status: Additional Care Required  Permanent Disability:No    Plan:      Diagnostics:      Comments:       Disability Information   Status: Released to Restricted Duty    From:  1/22/2024  Through: 1/29/2024 Restrictions are: Temporary   Physical Restrictions   Sitting:    Standing:  < or = to 4 hrs/day Stooping:    Bending:      Squatting:    Walking:  < or = to 4 hrs/day Climbing:    Pushing:      Pulling:    Other:    Reaching Above Shoulder (L):   Reaching Above Shoulder (R):       Reaching Below Shoulder (L):    Reaching Below Shoulder (R):      Not to exceed Weight Limits   Carrying(hrs):   Weight Limit(lb):   Lifting(hrs):   Weight  Limit(lb):     Comments: Approximately 25% improvement since last visit but still having daily pain aggravated with weightbearing.  Symptoms are of insidious onset without any direct trauma or  "injury to explain the symptoms.  I reviewed the x-rays which were taken has initial visit which demonstrated possible degenerative changes along the calcaneus/cuboid cuboid/fifth metatarsal joints which could be the source of the symptoms.  There are were acute osseous abnormalities.  Patient also reports recurrent ankle sprains/rolling his ankle but I did not appreciate any significant laxity.  -Restrictions per D39.  Limit walking and standing to 4 hours  -Placed in a tall walking boot for better fit.  Patient must wear boot while at work and at all times with ambulation.  -Recommended purchasing an \"even up\" for his contralateral foot to correct the leg length discrepancy  -Ibuprofen as needed for symptomatic relief  -Follow-up with occupational medicine at [address] [place] [address] within 1 to 2 weeks  -Ordered referral to follow-up with orthopedics, foot and ankle    Repetitive Actions   Hands: i.e. Fine Manipulations from Grasping:     Feet: i.e. Operating Foot Controls:     Driving / Operate Machinery:     Health Care Provider’s Original or Electronic Signature  Petey Kraus D.O. Health Care Provider’s Original or Electronic Signature    Magdi Nicholas DO MPH     Clinic Name / Location: [address],   [address]  [place] [address] Clinic Phone Number: Dept: [phone]   Appointment Time: 1:45 Pm Visit Start Time: 2:23 PM   Check-In Time:  1:34 Pm Visit Discharge Time:  3:43 PM   Original-Treating Physician or Chiropractor    Page 2-Insurer/TPA    Page 3-Employer    Page 4-Employee      "

## 2024-02-08 ENCOUNTER — OFFICE VISIT (OUTPATIENT)
Dept: URGENT CARE | Facility: CLINIC | Age: 39
End: 2024-02-08
Payer: COMMERCIAL

## 2024-02-08 VITALS
OXYGEN SATURATION: 96 % | HEIGHT: 69 IN | RESPIRATION RATE: 16 BRPM | WEIGHT: 315 LBS | BODY MASS INDEX: 46.65 KG/M2 | TEMPERATURE: 97.2 F | DIASTOLIC BLOOD PRESSURE: 90 MMHG | SYSTOLIC BLOOD PRESSURE: 138 MMHG | HEART RATE: 70 BPM
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
## 2024-02-08 DIAGNOSIS — M79.671 RIGHT FOOT PAIN: ICD-10-CM

## 2024-02-08 DIAGNOSIS — S96.911D: ICD-10-CM

## 2024-02-08 PROCEDURE — 3080F DIAST BP >= 90 MM HG: CPT | Performed by: NURSE PRACTITIONER

## 2024-02-08 PROCEDURE — 3075F SYST BP GE 130 - 139MM HG: CPT | Performed by: NURSE PRACTITIONER

## 2024-02-08 PROCEDURE — 99214 OFFICE O/P EST MOD 30 MIN: CPT | Performed by: NURSE PRACTITIONER

## 2024-02-08 ASSESSMENT — ENCOUNTER SYMPTOMS
CONSTITUTIONAL NEGATIVE: 1
NEUROLOGICAL NEGATIVE: 1

## 2024-02-08 ASSESSMENT — FIBROSIS 4 INDEX: FIB4 SCORE: 0.45

## 2024-02-08 ASSESSMENT — VISUAL ACUITY: OU: 1

## 2024-02-09 NOTE — PROGRESS NOTES
"Subjective:     Connor Jacinto Jr. is a 38 y.o. male who presents for Foot Injury (DOI 12/20/23, R foot, leave of absence paperwork needs filled out  )       Foot Problem  This is a new problem. The problem has been unchanged.     Patient seen in urgent care 1/3/2024.  Initially seen as a new Worker's Comp.  Notes reviewed.    \"DOI 12/20/2023.  Pt presents for evaluation of a new work comp injury.  Magdi is a very pleasant 38-year-old male presents to urgent care today with complaints of right-sided foot pain that has been ongoing since 12/20/2023.  He does work at [email or web address] and notes that he walks approximately 10 to 20 miles per day.  His pain waxes and wanes throughout the day.  He notes at times he tries to apply weight and is unable to do so due to his severe discomfort.  He has been using heat and rest which help alleviate his discomfort.  No use of over-the-counter pain relievers.  He denies any previous injury to this right foot.  No other employers at this time.\"    X-ray was performed showing no acute injury.  Prescribed prednisone for his symptoms.  Advised rest, ice, and Ace wrap.  He was placed on work restrictions.    Patient subsequently followed up in urgent care 1/14/2024 and 1/22/2024.  Was referred to occupational medicine.  He was placed in a walking boot.  Work restrictions were continued.    Eventually, his Worker's Comp. was denied.    Patient continues to have symptoms of right lateral foot pain and tenderness and now manages his condition through podiatry and PCP. Recently saw podiatry who performed an injection. Patient reports podiatry has scheduled an MRI.    Patient brings in disability paperwork from his employer to document his illness/condition from 12/20/2023 up to today. Reports he has not been back to work since 12/20/2023 as his walking boot precluded him from his current work duties.    No other/new symptoms.    Review of Systems   Constitutional: Negative.    Musculoskeletal:        "  R foot pain   Neurological: Negative.    All other systems reviewed and are negative.    Refer to HPI for additional details.    During this visit, appropriate PPE was worn, and hand hygiene was performed.    PMH:  has a past medical history of Chickenpox, Diabetes (HCC), Fatty liver, Obesity, and Sleep apnea.    MEDS:   Current Outpatient Medications:     testosterone cypionate (DEPO-TESTOSTERONE) 200 MG/ML Solution injection, Inject 50 mg into the shoulder, thigh, or buttocks every 7 days. On Thur, Disp: , Rfl:     OZEMPIC, 1 MG/DOSE, 4 MG/3ML Solution Pen-injector, Inject 1 mg under the skin every 7 days. On Thur, Disp: , Rfl:     traZODone (DESYREL) 50 MG Tab, Take 100-150 mg by mouth at bedtime as needed for Sleep., Disp: , Rfl:     sildenafil citrate (VIAGRA) 100 MG tablet, Take 100 mg by mouth every 24 hours as needed for Erectile Dysfunction., Disp: , Rfl:     ibuprofen (MOTRIN) 600 MG Tab, Take 1 Tablet by mouth 3 times a day with meals., Disp: 20 Tablet, Rfl: 0    meloxicam (MOBIC) 7.5 MG Tab, Take 1 Tablet by mouth every 24 hours as needed. (Patient not taking: Reported on 1/14/2024), Disp: , Rfl:     Cholecalciferol (D3 PO), Take 1 Capsule by mouth every evening. (Patient not taking: Reported on 2/8/2024), Disp: , Rfl:     atorvastatin (LIPITOR) 20 MG Tab, Take 1 Tablet by mouth every evening. (Patient not taking: Reported on 1/22/2024), Disp: , Rfl:     ALLERGIES:   Allergies   Allergen Reactions    Morphine      Pt reports that its makes his body feel on fire      SURGHX:   Past Surgical History:   Procedure Laterality Date    FL INCIS/DRAIN SCROTUM/TESTIS,EPIDIDYM  3/17/2022    Procedure: INCISION AND DRAINAGE, SCROTUM;  Surgeon: James Adams M.D.;  [facility]: SURGERY [facility];  Service: Urology    HERNIA REPAIR      KNEE RECONSTRUCTION       SOCHX:  reports that he has quit smoking. His smokeless tobacco use includes chew. He reports that he does not currently use alcohol. He reports that he  "does not currently use drugs after having used the following drugs: Inhaled.    FH: Per HPI as applicable/pertinent.      Objective:     BP (!) 138/90 (BP Location: Left arm, Patient Position: Sitting, BP Cuff Size: Adult)   Pulse 70   Temp 36.2 °C (97.2 °F) (Temporal)   Resp 16   Ht 1.753 m (5' 9\")   Wt (!) 157 kg (346 lb)   SpO2 96%   BMI 51.10 kg/m²     Physical Exam  Nursing note reviewed.   Constitutional:       General: He is not in acute distress.     Appearance: He is well-developed. He is not ill-appearing or toxic-appearing.   Eyes:      General: Vision grossly intact.   Cardiovascular:      Rate and Rhythm: Normal rate.      Pulses: Normal pulses.   Pulmonary:      Effort: Pulmonary effort is normal. No respiratory distress.   Musculoskeletal:         General: No deformity. Normal range of motion.      Right ankle: Normal.      Right foot: Normal range of motion and normal capillary refill. Swelling and tenderness present. No deformity.      Comments: Localized TTP, mild swelling at base of right 5th metatarsal   Skin:     General: Skin is warm and dry.      Capillary Refill: Capillary refill takes less than 2 seconds.      Coloration: Skin is not pale.   Neurological:      Mental Status: He is alert and oriented to person, place, and time.      Motor: No weakness.   Psychiatric:         Behavior: Behavior normal. Behavior is cooperative.       Assessment/Plan:     1. Right foot pain    2. Repetitive strain injury of right foot, subsequent encounter    Disability paper from [employer] completed, faxed, and scanned into [employer]. Work restrictions placed through 3/20/2024 or until as advised by podiatry.    Patient will continue to follow up with podiatry.    Differential diagnosis, natural history, supportive care, over-the-counter symptom management per 's instructions, close monitoring, and indications for immediate follow-up discussed.     All questions answered. Patient agrees with the plan " of care.    Billing note: 30 minutes was allotted and spent for patient care and coordination of care (not reported separately) including preparing for the visit, obtaining/reviewing history from/with patient, performing an exam/evaluation, developing a plan of care, counseling/educating the patient, reviewing the medical record, completing disability paperwork, and documentation. This template was updated to summarize care specific to this encounter. Established patient. [id number]. Please refer to the chart for additional details on the care provided.
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
## 2024-08-30 NOTE — CARE PLAN
The patient is Stable - Low risk of patient condition declining or worsening    Shift Goals  Clinical Goals: Pain control  Patient Goals: Pain control    Progress made toward(s) clinical / shift goals:  Pain controlled with PRN medications.     Problem: Pain - Standard  Goal: Alleviation of pain or a reduction in pain to the patient’s comfort goal  Outcome: Progressing     Problem: Knowledge Deficit - Standard  Goal: Patient and family/care givers will demonstrate understanding of plan of care, disease process/condition, diagnostic tests and medications  Outcome: Progressing     Problem: Diabetes Management  Goal: Patient will achieve and maintain glucose in satisfactory range  Outcome: Progressing       Patient is not progressing towards the following goals:       No

## 2024-10-24 ENCOUNTER — HOSPITAL ENCOUNTER (EMERGENCY)
Facility: MEDICAL CENTER | Age: 39
End: 2024-10-24
Attending: EMERGENCY MEDICINE
Payer: COMMERCIAL

## 2024-10-24 ENCOUNTER — APPOINTMENT (OUTPATIENT)
Dept: RADIOLOGY | Facility: MEDICAL CENTER | Age: 39
End: 2024-10-24
Attending: EMERGENCY MEDICINE
Payer: COMMERCIAL

## 2024-10-24 VITALS
DIASTOLIC BLOOD PRESSURE: 84 MMHG | BODY MASS INDEX: 46.65 KG/M2 | TEMPERATURE: 98 F | SYSTOLIC BLOOD PRESSURE: 187 MMHG | HEIGHT: 69 IN | WEIGHT: 315 LBS | RESPIRATION RATE: 18 BRPM | OXYGEN SATURATION: 95 % | HEART RATE: 84 BPM

## 2024-10-24 DIAGNOSIS — S63.635A SPRAIN OF INTERPHALANGEAL JOINT OF LEFT RING FINGER, INITIAL ENCOUNTER: ICD-10-CM

## 2024-10-24 PROCEDURE — 73140 X-RAY EXAM OF FINGER(S): CPT | Mod: LT

## 2024-10-24 PROCEDURE — 99283 EMERGENCY DEPT VISIT LOW MDM: CPT

## 2024-10-24 RX ORDER — IBUPROFEN 600 MG/1
600 TABLET, FILM COATED ORAL EVERY 6 HOURS PRN
Qty: 30 TABLET | Refills: 0 | Status: SHIPPED | OUTPATIENT
Start: 2024-10-24

## 2024-10-24 ASSESSMENT — PAIN DESCRIPTION - PAIN TYPE: TYPE: ACUTE PAIN

## 2024-10-24 ASSESSMENT — FIBROSIS 4 INDEX: FIB4 SCORE: 0.46

## 2024-12-30 ENCOUNTER — OFFICE VISIT (OUTPATIENT)
Dept: URGENT CARE | Facility: CLINIC | Age: 39
End: 2024-12-30
Payer: COMMERCIAL

## 2024-12-30 VITALS
TEMPERATURE: 98.4 F | DIASTOLIC BLOOD PRESSURE: 98 MMHG | HEART RATE: 111 BPM | RESPIRATION RATE: 20 BRPM | WEIGHT: 315 LBS | BODY MASS INDEX: 46.65 KG/M2 | SYSTOLIC BLOOD PRESSURE: 142 MMHG | HEIGHT: 69 IN | OXYGEN SATURATION: 96 %

## 2024-12-30 DIAGNOSIS — A08.4 VIRAL GASTROENTERITIS: ICD-10-CM

## 2024-12-30 RX ORDER — ONDANSETRON 4 MG/1
4 TABLET, ORALLY DISINTEGRATING ORAL ONCE
Status: COMPLETED | OUTPATIENT
Start: 2024-12-30 | End: 2024-12-30

## 2024-12-30 RX ORDER — ONDANSETRON 4 MG/1
4 TABLET, ORALLY DISINTEGRATING ORAL EVERY 6 HOURS PRN
Qty: 10 TABLET | Refills: 0 | Status: SHIPPED | OUTPATIENT
Start: 2024-12-30

## 2024-12-30 RX ADMIN — ONDANSETRON 4 MG: 4 TABLET, ORALLY DISINTEGRATING ORAL at 15:04

## 2024-12-30 ASSESSMENT — ENCOUNTER SYMPTOMS
CHILLS: 1
NUMBER OF EPISODES OF EMESIS TODAY: 1
FEVER: 0
BLOOD IN STOOL: 0
DIARRHEA: 1
VOMITING: 1
ABDOMINAL PAIN: 0
NAUSEA: 1

## 2024-12-30 NOTE — PROGRESS NOTES
"Subjective     Connor Jacinto Jr. is a 39 y.o. male who presents with Emesis (X2 days with diarrhea, nausea, fatigue, loss of appetite, body aches, and chills.)            Patient presents with:  Emesis: X2 days with diarrhea, nausea, fatigue, loss of appetite, body aches, and chills.  Patient thinks he may have [place] a casino that gave him food poisoning because no one in else at home has similar symptoms.  Patient has not tried any over-the-counter medications for his symptoms.  Patient missed work yesterday and today due to his symptoms.  No other complaints.        Emesis  This is a new problem. The current episode started in the past 7 days. Associated symptoms include chills, nausea and vomiting. Pertinent negatives include no abdominal pain or fever.       Review of Systems   Constitutional:  Positive for chills. Negative for fever.   Gastrointestinal:  Positive for diarrhea, nausea and vomiting. Negative for abdominal pain, blood in stool and melena.   All other systems reviewed and are negative.             Objective     BP (!) 142/98   Pulse (!) 111   Temp 36.9 °C (98.4 °F) (Temporal)   Resp 20   Ht 1.753 m (5' 9\")   Wt (!) 150 kg (331 lb 6.4 oz)   SpO2 96%   BMI 48.94 kg/m²      Physical Exam  Vitals and nursing note reviewed.   Constitutional:       General: He is not in acute distress.     Appearance: Normal appearance. He is well-developed. He is not ill-appearing or toxic-appearing.   HENT:      Head: Normocephalic and atraumatic.      Right Ear: Tympanic membrane normal.      Left Ear: Tympanic membrane normal.      Nose: Nose normal.      Mouth/Throat:      Lips: Pink.      Mouth: Mucous membranes are moist.      Pharynx: Oropharynx is clear. Uvula midline.   Eyes:      Extraocular Movements: Extraocular movements intact.      Conjunctiva/sclera: Conjunctivae normal.      Pupils: Pupils are equal, round, and reactive to light.   Cardiovascular:      Rate and Rhythm: Regular " rhythm. Tachycardia present.      Pulses: Normal pulses.      Heart sounds: Normal heart sounds.   Pulmonary:      Effort: Pulmonary effort is normal.      Breath sounds: Normal breath sounds.   Abdominal:      General: Bowel sounds are normal.      Palpations: Abdomen is soft.   Musculoskeletal:         General: Normal range of motion.      Cervical back: Normal range of motion and neck supple.   Skin:     General: Skin is warm and dry.      Capillary Refill: Capillary refill takes less than 2 seconds.   Neurological:      General: No focal deficit present.      Mental Status: He is alert and oriented to person, place, and time.      Cranial Nerves: No cranial nerve deficit.      Motor: Motor function is intact.      Coordination: Coordination is intact.      Gait: Gait normal.   Psychiatric:         Mood and Affect: Mood normal.                             Assessment & Plan        Assessment & Plan  Viral gastroenteritis    Orders:    ondansetron (Zofran ODT) dispertab 4 mg    ondansetron (ZOFRAN ODT) 4 MG TABLET DISPERSIBLE; Take 1 Tablet by mouth every 6 hours as needed for Nausea/Vomiting.            Patient HPI physical exam consistent with some acute viral gastroenteritis likely from eating at a casino recently.  I have given patient an oral dose of Zofran and ODT in clinic today which she tolerated well.    I have sent a prescription for Zofran and ODT for patient to take as needed for nausea vomiting.  Patient was instructed to go take over-the-counter Imodium as desired for diarrhea as long as he does not have a fever or blood in his stool.  Patient verbalized understanding of these instructions.    PT to follow clear diet for 8-12 hours, then progress to bland diet for 24 hours, then progress to regular diet as tolerated.     Differential diagnosis, supportive care, and indications for immediate follow-up discussed with patient.  Instructed to return to clinic or nearest emergency department for any  change in condition, further concerns, or worsening of symptoms.    I personally reviewed prior external notes and test results pertinent to today's visit.  I have independently reviewed and interpreted all diagnostics ordered during this urgent care visit.    PT should follow up with PCP in 1-2 days for re-evaluation if symptoms have not improved.      Discussed red flags and reasons to return to UC or ED.      Pt and/or family verbalized understanding of diagnosis and follow up instructions and was offered informational handout on diagnosis.  PT discharged.     Please note that this dictation was created using voice recognition software. I have made every reasonable attempt to correct obvious errors, but I expect that there may be errors of grammar and possibly content that I did not discover before finalizing the note.

## 2024-12-30 NOTE — LETTER
December 30, 2024    To Whom It May Concern:         This is confirmation that Connortirso Jacinto Jr. attended his scheduled appointment with Shari Baxter P.A.-C. on 12/30/24. 01/04/2024 or sooner if condition improves sooner.            If you have any questions please do not hesitate to call me at the phone number listed below.    Sincerely,          Shari Baxter P.A.-C.  [phone]

## 2025-01-12 ENCOUNTER — HOSPITAL ENCOUNTER (EMERGENCY)
Facility: MEDICAL CENTER | Age: 40
End: 2025-01-12
Attending: EMERGENCY MEDICINE
Payer: COMMERCIAL

## 2025-01-12 VITALS
HEART RATE: 92 BPM | BODY MASS INDEX: 46.65 KG/M2 | TEMPERATURE: 98.8 F | SYSTOLIC BLOOD PRESSURE: 136 MMHG | DIASTOLIC BLOOD PRESSURE: 96 MMHG | WEIGHT: 315 LBS | RESPIRATION RATE: 18 BRPM | HEIGHT: 69 IN | OXYGEN SATURATION: 96 %

## 2025-01-12 DIAGNOSIS — R52 BODY ACHES: ICD-10-CM

## 2025-01-12 DIAGNOSIS — J10.1 INFLUENZA A: ICD-10-CM

## 2025-01-12 LAB
APPEARANCE UR: CLEAR
BILIRUB UR QL STRIP.AUTO: NEGATIVE
COLOR UR: YELLOW
FLUAV RNA SPEC QL NAA+PROBE: POSITIVE
FLUBV RNA SPEC QL NAA+PROBE: NEGATIVE
GLUCOSE UR STRIP.AUTO-MCNC: NEGATIVE MG/DL
KETONES UR STRIP.AUTO-MCNC: NEGATIVE MG/DL
LEUKOCYTE ESTERASE UR QL STRIP.AUTO: NEGATIVE
MICRO URNS: NORMAL
NITRITE UR QL STRIP.AUTO: NEGATIVE
PH UR STRIP.AUTO: 6 [PH] (ref 5–8)
PROT UR QL STRIP: NEGATIVE MG/DL
RBC UR QL AUTO: NEGATIVE
RSV RNA SPEC QL NAA+PROBE: NEGATIVE
SARS-COV-2 RNA RESP QL NAA+PROBE: NOTDETECTED
SP GR UR STRIP.AUTO: >=1.03
SPECIMEN SOURCE: ABNORMAL

## 2025-01-12 PROCEDURE — 99284 EMERGENCY DEPT VISIT MOD MDM: CPT

## 2025-01-12 PROCEDURE — 81003 URINALYSIS AUTO W/O SCOPE: CPT

## 2025-01-12 PROCEDURE — 700102 HCHG RX REV CODE 250 W/ 637 OVERRIDE(OP): Performed by: EMERGENCY MEDICINE

## 2025-01-12 PROCEDURE — A9270 NON-COVERED ITEM OR SERVICE: HCPCS | Performed by: EMERGENCY MEDICINE

## 2025-01-12 PROCEDURE — 0241U HCHG SARS-COV-2 COVID-19 NFCT DS RESP RNA 4 TRGT MIC: CPT

## 2025-01-12 RX ORDER — OSELTAMIVIR PHOSPHATE 75 MG/1
75 CAPSULE ORAL ONCE
Status: COMPLETED | OUTPATIENT
Start: 2025-01-12 | End: 2025-01-12

## 2025-01-12 RX ORDER — ACETAMINOPHEN 325 MG/1
650 TABLET ORAL ONCE
Status: COMPLETED | OUTPATIENT
Start: 2025-01-12 | End: 2025-01-12

## 2025-01-12 RX ORDER — IBUPROFEN 600 MG/1
600 TABLET, FILM COATED ORAL ONCE
Status: COMPLETED | OUTPATIENT
Start: 2025-01-12 | End: 2025-01-12

## 2025-01-12 RX ORDER — OSELTAMIVIR PHOSPHATE 75 MG/1
75 CAPSULE ORAL 2 TIMES DAILY
Qty: 10 CAPSULE | Refills: 0 | Status: ACTIVE | OUTPATIENT
Start: 2025-01-12 | End: 2025-01-17

## 2025-01-12 RX ADMIN — ACETAMINOPHEN 650 MG: 325 TABLET ORAL at 16:15

## 2025-01-12 RX ADMIN — OSELTAMIVIR PHOSPHATE 75 MG: 75 CAPSULE ORAL at 16:15

## 2025-01-12 RX ADMIN — IBUPROFEN 600 MG: 600 TABLET, FILM COATED ORAL at 16:15

## 2025-01-12 NOTE — ED PROVIDER NOTES
ER Provider Note    Scribed for Norberto Real M.D. by Lai Samuels. 1/12/2025   3:43 PM    Primary Care Provider: Yeyo Mcmahan M.D.    CHIEF COMPLAINT  Chief Complaint   Patient presents with    Flu Like Symptoms     The last 2 days    pt believes he has a repeat of COVID     Headache    Generalized Body Aches     Everything hurts   started 2 days ago     Loss of Appetite     EXTERNAL RECORDS REVIEWED  Outpatient Notes Patient seen at urgent care 12/30/2024 with nausea, diarrhea, and fatigue. He was given a prescription for Zofran at that encounter.      HPI/ROS  LIMITATION TO HISTORY   Select: : None  OUTSIDE HISTORIAN(S):  Significant other brings the patient in today and helped him describe his symptoms.     Connor Jacinto Jr. is a 39 y.o. male who presents to the ED for evaluation of flu like symptoms onset three days ago. Patient reports that he is having significant body aches to his upper back and shoulders. He notes associated cough, cold, rhinorrhea, and fever. He denies any nausea or vomiting. No alleviating factors attempted.      PAST MEDICAL HISTORY  Past Medical History:   Diagnosis Date    Chickenpox     Diabetes (HCC)     Fatty liver     Obesity     Sleep apnea        SURGICAL HISTORY  Past Surgical History:   Procedure Laterality Date    MS INCIS/DRAIN SCROTUM/TESTIS,EPIDIDYM  3/17/2022    Procedure: INCISION AND DRAINAGE, SCROTUM;  Surgeon: James Adams M.D.;  Location: SURGERY [facility];  Service: Urology    HERNIA REPAIR      KNEE RECONSTRUCTION         FAMILY HISTORY  Family History   Problem Relation Age of Onset    Diabetes Mother     Heart Disease Father         55    Hypertension Father     Diabetes Father     Lung Disease Father         mesothelioma    Diabetes Maternal Grandmother     Sleep Apnea Neg Hx        SOCIAL HISTORY   reports that he has quit smoking. His smokeless tobacco use includes chew. He reports current alcohol use. He reports that he does not  "currently use drugs.    CURRENT MEDICATIONS  Discharge Medication List as of 1/12/2025  4:25 PM        CONTINUE these medications which have NOT CHANGED    Details   ondansetron (ZOFRAN ODT) 4 MG TABLET DISPERSIBLE Take 1 Tablet by mouth every 6 hours as needed for Nausea/Vomiting., Disp-10 Tablet, R-0, Normal      testosterone cypionate (DEPO-TESTOSTERONE) 200 MG/ML Solution injection Inject 50 mg into the shoulder, thigh, or buttocks every 7 days. On Thur, Historical Med      OZEMPIC, 1 MG/DOSE, 4 MG/3ML Solution Pen-injector Inject 1 mg under the skin every 7 days. On Thur, LOUISE, Historical Med      traZODone (DESYREL) 50 MG Tab Take 100-150 mg by mouth at bedtime as needed for Sleep., Historical Med      ibuprofen (MOTRIN) 600 MG Tab Take 1 Tablet by mouth every 6 hours as needed for Moderate Pain or Inflammation., Disp-30 Tablet, R-0, Normal      meloxicam (MOBIC) 7.5 MG Tab Take 1 Tablet by mouth every 24 hours as needed., Historical Med      sildenafil citrate (VIAGRA) 100 MG tablet Take 100 mg by mouth every 24 hours as needed for Erectile Dysfunction., Historical Med      Cholecalciferol (D3 PO) Take 1 Capsule by mouth every evening., Historical Med      atorvastatin (LIPITOR) 20 MG Tab Take 1 Tablet by mouth every evening., Historical Med             ALLERGIES  Allergies   Allergen Reactions    Morphine      Pt reports that its makes his body feel on fire         PHYSICAL EXAM  BP (!) 148/111   Pulse (!) 112   Temp 37.1 °C (98.8 °F) (Temporal)   Resp 20   Ht 1.753 m (5' 9\")   Wt (!) 147 kg (323 lb 6.6 oz)   SpO2 95%   BMI 47.76 kg/m²    Nursing note and vitals reviewed.  Constitutional: Well-developed and well-nourished. No distress.   HENT: Head is normocephalic and atraumatic. Oropharynx is clear and moist without exudate or erythema. Clear rhinorrhea.   Eyes: Pupils are equal, round, and reactive to light. Conjunctiva are normal.   Cardiovascular: Normal rate and regular rhythm. No murmur heard. " Normal radial pulses.  Pulmonary/Chest: Breath sounds normal. No wheezes or rales.   Abdominal: Soft and non-tender. No distention    Musculoskeletal: Extremities exhibit normal range of motion without edema or tenderness.   Neurological: Awake, alert and oriented to person, place, and time. No focal deficits noted.  Skin: Skin is warm and dry. No rash.   Psychiatric: Normal mood and affect. Appropriate for clinical situation    DIAGNOSTIC STUDIES    Labs:   Results for orders placed or performed during the hospital encounter of 01/12/25   CoV-2, Flu A/B, And RSV by PCR ([facility])    Collection Time: 01/12/25  2:34 PM    Specimen: Nasal; Respirate   Result Value Ref Range    Influenza virus A RNA POSITIVE (A) Negative    Influenza virus B, PCR Negative Negative    RSV, PCR Negative Negative    SARS-CoV-2 by PCR NotDetected     SARS-CoV-2 Source NP Swab    URINALYSIS    Collection Time: 01/12/25  3:59 PM    Specimen: Urine, Clean Catch   Result Value Ref Range    Color Yellow     Character Clear     Specific Gravity >=1.030 <1.035    Ph 6.0 5.0 - 8.0    Glucose Negative Negative mg/dL    Ketones Negative Negative mg/dL    Protein Negative Negative mg/dL    Bilirubin Negative Negative    Nitrite Negative Negative    Leukocyte Esterase Negative Negative    Occult Blood Negative Negative    Micro Urine Req see below        ASSESSMENT AND PLAN    3:43 PM - Patient was evaluated at bedside. Patient is complaining of flu like symptoms including body aches, cough, congestion, rhinorrhea, and fever. He has not had any GI symptoms. Ordered for CoV, Flu A/B, and RSV by PCR to evaluate.     Laboratory testing confirms influenza A.    The patient will be medicated with Tamiflu, Motrin, and Tylenol for his symptoms. The patient presents today with signs and symptoms consistent with a viral upper respiratory infection. They have a normal pulse oximetry on room air and a normal pulmonary exam. Therefore, I feel that the likelihood  of pneumonia is low. This patient does not demonstrate any clinical evidence of pneumonia, meningitis, appendicitis, or other acute medical emergency. Overall, the patient is very well appearing. I do not feel that this patient would benefit from antibiotics at this time. I have recommended Tylenol and/or ibuprofen for fever.       The patient was treated with Zofran for nausea.  Placed on a cardiac monitor to monitor for any arrhythmia associated with Zofran and QT prolongation.      DISPOSITION AND DISCUSSIONS    I have discussed management of the patient with the following physicians and ROSENDO's:  None.     Discussion of management with other Hospitals in [place] or appropriate source(s): None     Barriers to care at this time, including but not limited to:  None are known .     Decision tools and prescription drugs considered including, but not limited to:  Tamiflu .     The patient will return for new or worsening symptoms and is stable at the time of discharge.    DISPOSITION:  Patient will be discharged home in stable condition.    FOLLOW UP:  Yeyo Mcmahan M.D.  [id number]  [address]  [place] [address]  [phone]    Schedule an appointment as soon as possible for a visit       [facility], Emergency Dept  [address]  [facility] [id number] [phone]    If symptoms worsen      OUTPATIENT MEDICATIONS:  Discharge Medication List as of 1/12/2025  4:25 PM        START taking these medications    Details   oseltamivir (TAMIFLU) 75 MG Cap Take 1 Capsule by mouth 2 times a day for 5 days., Disp-10 Capsule, R-0, Normal              FINAL DIAGNOSIS  1. Influenza A    2. Body aches         Lai ACUÑA (Sheila), am scribing for, and in the presence of, Norberto Real M.D..    Electronically signed by: Lai Samuels (Sheila), 1/12/2025    Norberto ACUÑA M.D. personally performed the services described in this documentation, as scribed by Lai Samuels in my presence, and it  is both accurate and complete.      The note accurately reflects work and decisions made by me.  Norberto Real M.D.  1/12/2025  9:04 PM

## 2025-07-24 ENCOUNTER — HOSPITAL ENCOUNTER (EMERGENCY)
Facility: MEDICAL CENTER | Age: 40
End: 2025-07-25
Attending: STUDENT IN AN ORGANIZED HEALTH CARE EDUCATION/TRAINING PROGRAM
Payer: COMMERCIAL

## 2025-07-24 DIAGNOSIS — M79.632 LEFT FOREARM PAIN: Primary | ICD-10-CM

## 2025-07-24 DIAGNOSIS — S39.012S LUMBOSACRAL STRAIN, SEQUELA: ICD-10-CM

## 2025-07-24 DIAGNOSIS — M25.562 ACUTE PAIN OF LEFT KNEE: ICD-10-CM

## 2025-07-24 PROCEDURE — 99284 EMERGENCY DEPT VISIT MOD MDM: CPT

## 2025-07-24 RX ORDER — KETOROLAC TROMETHAMINE 15 MG/ML
15 INJECTION, SOLUTION INTRAMUSCULAR; INTRAVENOUS ONCE
Status: COMPLETED | OUTPATIENT
Start: 2025-07-25 | End: 2025-07-25

## 2025-07-24 RX ORDER — ACETAMINOPHEN 325 MG/1
650 TABLET ORAL ONCE
Status: COMPLETED | OUTPATIENT
Start: 2025-07-25 | End: 2025-07-25

## 2025-07-25 ENCOUNTER — HOSPITAL ENCOUNTER (OUTPATIENT)
Dept: RADIOLOGY | Facility: MEDICAL CENTER | Age: 40
End: 2025-07-25
Attending: STUDENT IN AN ORGANIZED HEALTH CARE EDUCATION/TRAINING PROGRAM
Payer: COMMERCIAL

## 2025-07-25 ENCOUNTER — HOSPITAL ENCOUNTER (OUTPATIENT)
Dept: RADIOLOGY | Facility: MEDICAL CENTER | Age: 40
End: 2025-07-25
Attending: STUDENT IN AN ORGANIZED HEALTH CARE EDUCATION/TRAINING PROGRAM

## 2025-07-25 VITALS
HEIGHT: 69 IN | HEART RATE: 86 BPM | OXYGEN SATURATION: 94 % | DIASTOLIC BLOOD PRESSURE: 82 MMHG | WEIGHT: 315 LBS | TEMPERATURE: 97.2 F | RESPIRATION RATE: 15 BRPM | SYSTOLIC BLOOD PRESSURE: 156 MMHG | BODY MASS INDEX: 46.65 KG/M2

## 2025-07-25 PROCEDURE — 700111 HCHG RX REV CODE 636 W/ 250 OVERRIDE (IP): Mod: JZ | Performed by: STUDENT IN AN ORGANIZED HEALTH CARE EDUCATION/TRAINING PROGRAM

## 2025-07-25 PROCEDURE — A9270 NON-COVERED ITEM OR SERVICE: HCPCS | Performed by: STUDENT IN AN ORGANIZED HEALTH CARE EDUCATION/TRAINING PROGRAM

## 2025-07-25 PROCEDURE — 700102 HCHG RX REV CODE 250 W/ 637 OVERRIDE(OP): Performed by: STUDENT IN AN ORGANIZED HEALTH CARE EDUCATION/TRAINING PROGRAM

## 2025-07-25 PROCEDURE — 96372 THER/PROPH/DIAG INJ SC/IM: CPT

## 2025-07-25 PROCEDURE — 73090 X-RAY EXAM OF FOREARM: CPT | Mod: LT

## 2025-07-25 PROCEDURE — 73562 X-RAY EXAM OF KNEE 3: CPT | Mod: LT

## 2025-07-25 RX ORDER — NAPROXEN 500 MG/1
500 TABLET ORAL 2 TIMES DAILY WITH MEALS
Qty: 60 TABLET | Refills: 0 | Status: SHIPPED | OUTPATIENT
Start: 2025-07-25

## 2025-07-25 RX ADMIN — ACETAMINOPHEN 650 MG: 325 TABLET ORAL at 00:30

## 2025-07-25 RX ADMIN — KETOROLAC TROMETHAMINE 15 MG: 15 INJECTION, SOLUTION INTRAMUSCULAR; INTRAVENOUS at 00:32

## 2025-07-25 ASSESSMENT — PAIN DESCRIPTION - PAIN TYPE: TYPE: ACUTE PAIN
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
## 2025-07-25 NOTE — DISCHARGE INSTRUCTIONS
You will need to follow-up as an outpatient with orthopedics if you are still having ongoing pain they can consider obtaining an MRI to see if you have an underlying ligamentous injury we will provide you with a sling and an Ace wrap for your knee pain is return if you have worsening pain, numbness, weakness or other concern
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
## 2025-07-25 NOTE — ED NOTES
Pt discharged with instructions and script.  Velcro splint on right wrist and ace wrap to right knee.  Pt given ice packs to go .Pt verbalized understanding of discharge instructions.  Pt ambulated out of ED with spouse.

## 2025-07-28 NOTE — Clinical Note
REFERRAL APPROVAL NOTICE         Sent on July 28, 2025                   Connor Jacinto   [address]  [address]  [place] [id number]                   Dear Mr. Jacinto,    After a careful review of the medical information and benefit coverage, [facility] has processed your referral. See below for additional details.    If applicable, you must be actively enrolled with your insurance for coverage of the authorized service. If you have any questions regarding your coverage, please contact your insurance directly.    REFERRAL INFORMATION   Referral #:  [id number]  Referred-To Provider    Referred-By Provider:  Orthopedic Surgery    Ariela Shore M.D.   [id number] [facility] [address]  [address]  [place] [address]  [phone] [id number]  [place] [id number]  [phone]    Referral Start Date:  07/25/2025  Referral End Date:   07/25/2026             SCHEDULING  If you do not already have an appointment, please call [phone] to make an appointment.     MORE INFORMATION  If you do not already have a [email or web address] account, sign up at: [email or web address]  You can access your medical information, make appointments, see lab results, billing information, and more.  If you have questions regarding this referral, please contact  the [facility] Referrals department at:             [phone]. Monday - Friday 8:00AM - 5:00PM.     Sincerely,    [facility]

## (undated) DEVICE — SODIUM CHL IRRIGATION 0.9% 1000ML (12EA/CA)

## (undated) DEVICE — TRAY SRGPRP PVP IOD WT PRP - (20/CA)

## (undated) DEVICE — PACK MINOR BASIN - (2EA/CA)

## (undated) DEVICE — BLADE SURGICAL #15 - (50/BX 3BX/CA)

## (undated) DEVICE — ELECTRODE DUAL RETURN W/ CORD - (50/PK)

## (undated) DEVICE — SYRINGE 10 ML CONTROL LL (25EA/BX 4BX/CA)

## (undated) DEVICE — HEAD HOLDER JUNIOR/ADULT

## (undated) DEVICE — SET LEADWIRE 5 LEAD BEDSIDE DISPOSABLE ECG (1SET OF 5/EA)

## (undated) DEVICE — NEEDLE NON SAFETY HYPO 22 GA X 1 1/2 IN (100/BX)

## (undated) DEVICE — SPONGE GAUZESTER 4 X 4 4PLY - (128PK/CA)

## (undated) DEVICE — SLEEVE VASO CALF MED - (10PR/CA)

## (undated) DEVICE — DRAPE LAPAROTOMY T SHEET - (12EA/CA)

## (undated) DEVICE — DRAPE IOBAN II INCISE 23X17 - (10EA/BX 4BX/CA)

## (undated) DEVICE — GOWN WARMING STANDARD FLEX - (30/CA)

## (undated) DEVICE — NEPTUNE 4 PORT MANIFOLD - (20/PK)